# Patient Record
Sex: FEMALE | Race: WHITE | Employment: OTHER
[De-identification: names, ages, dates, MRNs, and addresses within clinical notes are randomized per-mention and may not be internally consistent; named-entity substitution may affect disease eponyms.]

---

## 2017-02-17 ENCOUNTER — TELEPHONE (OUTPATIENT)
Dept: INTERNAL MEDICINE | Facility: CLINIC | Age: 42
End: 2017-02-17

## 2017-02-17 ENCOUNTER — OFFICE VISIT (OUTPATIENT)
Dept: INTERNAL MEDICINE | Facility: CLINIC | Age: 42
End: 2017-02-17

## 2017-02-17 ENCOUNTER — HOSPITAL ENCOUNTER (OUTPATIENT)
Age: 42
Discharge: HOME | End: 2017-02-17
Payer: MEDICARE

## 2017-02-17 VITALS
DIASTOLIC BLOOD PRESSURE: 90 MMHG | HEIGHT: 63 IN | TEMPERATURE: 98.1 F | BODY MASS INDEX: 38.09 KG/M2 | WEIGHT: 215 LBS | SYSTOLIC BLOOD PRESSURE: 140 MMHG

## 2017-02-17 DIAGNOSIS — J06.9 UPPER RESPIRATORY TRACT INFECTION, UNSPECIFIED TYPE: Primary | ICD-10-CM

## 2017-02-17 PROCEDURE — G8428 CUR MEDS NOT DOCUMENT: HCPCS | Performed by: INTERNAL MEDICINE

## 2017-02-17 PROCEDURE — 99213 OFFICE O/P EST LOW 20 MIN: CPT | Performed by: INTERNAL MEDICINE

## 2017-02-17 PROCEDURE — 4004F PT TOBACCO SCREEN RCVD TLK: CPT | Performed by: INTERNAL MEDICINE

## 2017-02-17 PROCEDURE — G8417 CALC BMI ABV UP PARAM F/U: HCPCS | Performed by: INTERNAL MEDICINE

## 2017-02-17 PROCEDURE — G8484 FLU IMMUNIZE NO ADMIN: HCPCS | Performed by: INTERNAL MEDICINE

## 2017-02-17 RX ORDER — AZITHROMYCIN 250 MG/1
TABLET, FILM COATED ORAL
Qty: 6 TABLET | Refills: 0 | Status: SHIPPED | OUTPATIENT
Start: 2017-02-17 | End: 2017-03-14 | Stop reason: ALTCHOICE

## 2017-02-17 ASSESSMENT — PATIENT HEALTH QUESTIONNAIRE - PHQ9
2. FEELING DOWN, DEPRESSED OR HOPELESS: 3
9. THOUGHTS THAT YOU WOULD BE BETTER OFF DEAD, OR OF HURTING YOURSELF: 0
4. FEELING TIRED OR HAVING LITTLE ENERGY: 0
3. TROUBLE FALLING OR STAYING ASLEEP: 0
7. TROUBLE CONCENTRATING ON THINGS, SUCH AS READING THE NEWSPAPER OR WATCHING TELEVISION: 0
6. FEELING BAD ABOUT YOURSELF - OR THAT YOU ARE A FAILURE OR HAVE LET YOURSELF OR YOUR FAMILY DOWN: 0
10. IF YOU CHECKED OFF ANY PROBLEMS, HOW DIFFICULT HAVE THESE PROBLEMS MADE IT FOR YOU TO DO YOUR WORK, TAKE CARE OF THINGS AT HOME, OR GET ALONG WITH OTHER PEOPLE: 0
8. MOVING OR SPEAKING SO SLOWLY THAT OTHER PEOPLE COULD HAVE NOTICED. OR THE OPPOSITE, BEING SO FIGETY OR RESTLESS THAT YOU HAVE BEEN MOVING AROUND A LOT MORE THAN USUAL: 0
1. LITTLE INTEREST OR PLEASURE IN DOING THINGS: 0
SUM OF ALL RESPONSES TO PHQ QUESTIONS 1-9: 3
SUM OF ALL RESPONSES TO PHQ9 QUESTIONS 1 & 2: 3
5. POOR APPETITE OR OVEREATING: 0

## 2017-02-21 ENCOUNTER — HOSPITAL ENCOUNTER (EMERGENCY)
Age: 42
Discharge: HOME | End: 2017-02-21
Attending: EMERGENCY MEDICINE
Payer: MEDICARE

## 2017-02-21 ENCOUNTER — APPOINTMENT (OUTPATIENT)
Dept: GENERAL RADIOLOGY | Age: 42
End: 2017-02-21
Payer: MEDICARE

## 2017-02-21 VITALS
BODY MASS INDEX: 38.97 KG/M2 | WEIGHT: 220 LBS | TEMPERATURE: 98.8 F | DIASTOLIC BLOOD PRESSURE: 95 MMHG | HEART RATE: 94 BPM | SYSTOLIC BLOOD PRESSURE: 128 MMHG | RESPIRATION RATE: 18 BRPM

## 2017-02-21 DIAGNOSIS — B34.9 VIRAL ILLNESS: ICD-10-CM

## 2017-02-21 DIAGNOSIS — R52 BODY ACHES: Primary | ICD-10-CM

## 2017-02-21 LAB
-: ABNORMAL
ABSOLUTE EOS #: 0.1 K/UL (ref 0–0.4)
ABSOLUTE LYMPH #: 1.7 K/UL (ref 1–4.8)
ABSOLUTE MONO #: 0.4 K/UL (ref 0.1–1.2)
AMORPHOUS: ABNORMAL
BACTERIA: ABNORMAL
BASOPHILS # BLD: 1 % (ref 0–2)
BASOPHILS ABSOLUTE: 0 K/UL (ref 0–0.2)
BILIRUBIN URINE: NEGATIVE
CASTS UA: ABNORMAL /LPF (ref 0–8)
COLOR: YELLOW
COMMENT UA: ABNORMAL
CRYSTALS, UA: ABNORMAL /HPF
DIFFERENTIAL TYPE: NORMAL
EOSINOPHILS RELATIVE PERCENT: 1 % (ref 1–4)
EPITHELIAL CELLS UA: ABNORMAL /HPF (ref 0–5)
GLUCOSE URINE: NEGATIVE
HCG QUALITATIVE: NEGATIVE
HCT VFR BLD CALC: 39.4 % (ref 36–46)
HEMOGLOBIN: 13.2 G/DL (ref 12–16)
KETONES, URINE: NEGATIVE
LEUKOCYTE ESTERASE, URINE: NEGATIVE
LYMPHOCYTES # BLD: 30 % (ref 24–44)
MCH RBC QN AUTO: 27.2 PG (ref 26–34)
MCHC RBC AUTO-ENTMCNC: 33.4 G/DL (ref 31–37)
MCV RBC AUTO: 81.4 FL (ref 80–100)
MONOCYTES # BLD: 7 % (ref 2–11)
MUCUS: ABNORMAL
NITRITE, URINE: NEGATIVE
OTHER OBSERVATIONS UA: ABNORMAL
PDW BLD-RTO: 14.6 % (ref 12.5–15.4)
PH UA: 6 (ref 5–8)
PLATELET # BLD: 212 K/UL (ref 140–450)
PLATELET ESTIMATE: NORMAL
PMV BLD AUTO: 8.2 FL (ref 6–12)
PROTEIN UA: NEGATIVE
RBC # BLD: 4.84 M/UL (ref 4–5.2)
RBC # BLD: NORMAL 10*6/UL
RBC UA: ABNORMAL /HPF (ref 0–4)
RENAL EPITHELIAL, UA: ABNORMAL /HPF
SEG NEUTROPHILS: 61 % (ref 36–66)
SEGMENTED NEUTROPHILS ABSOLUTE COUNT: 3.4 K/UL (ref 1.8–7.7)
SPECIFIC GRAVITY UA: 1.02 (ref 1–1.03)
TRICHOMONAS: ABNORMAL
TURBIDITY: ABNORMAL
URINE HGB: NEGATIVE
UROBILINOGEN, URINE: NORMAL
WBC # BLD: 5.6 K/UL (ref 3.5–11)
WBC # BLD: NORMAL 10*3/UL
WBC UA: ABNORMAL /HPF (ref 0–5)
YEAST: ABNORMAL

## 2017-02-21 PROCEDURE — 87086 URINE CULTURE/COLONY COUNT: CPT

## 2017-02-21 PROCEDURE — 99285 EMERGENCY DEPT VISIT HI MDM: CPT

## 2017-02-21 PROCEDURE — S0028 INJECTION, FAMOTIDINE, 20 MG: HCPCS | Performed by: EMERGENCY MEDICINE

## 2017-02-21 PROCEDURE — 96375 TX/PRO/DX INJ NEW DRUG ADDON: CPT

## 2017-02-21 PROCEDURE — 96374 THER/PROPH/DIAG INJ IV PUSH: CPT

## 2017-02-21 PROCEDURE — 2500000003 HC RX 250 WO HCPCS: Performed by: EMERGENCY MEDICINE

## 2017-02-21 PROCEDURE — 93005 ELECTROCARDIOGRAM TRACING: CPT

## 2017-02-21 PROCEDURE — 6370000000 HC RX 637 (ALT 250 FOR IP): Performed by: EMERGENCY MEDICINE

## 2017-02-21 PROCEDURE — 85025 COMPLETE CBC W/AUTO DIFF WBC: CPT

## 2017-02-21 PROCEDURE — 84703 CHORIONIC GONADOTROPIN ASSAY: CPT

## 2017-02-21 PROCEDURE — 71020 XR CHEST STANDARD TWO VW: CPT

## 2017-02-21 PROCEDURE — 2580000003 HC RX 258: Performed by: EMERGENCY MEDICINE

## 2017-02-21 PROCEDURE — 6360000002 HC RX W HCPCS: Performed by: EMERGENCY MEDICINE

## 2017-02-21 PROCEDURE — 81001 URINALYSIS AUTO W/SCOPE: CPT

## 2017-02-21 PROCEDURE — 71020 XR CHEST STANDARD TWO VW: CPT | Performed by: RADIOLOGY

## 2017-02-21 RX ORDER — ONDANSETRON 2 MG/ML
4 INJECTION INTRAMUSCULAR; INTRAVENOUS ONCE
Status: COMPLETED | OUTPATIENT
Start: 2017-02-21 | End: 2017-02-21

## 2017-02-21 RX ORDER — MAGNESIUM HYDROXIDE/ALUMINUM HYDROXICE/SIMETHICONE 120; 1200; 1200 MG/30ML; MG/30ML; MG/30ML
30 SUSPENSION ORAL
Status: COMPLETED | OUTPATIENT
Start: 2017-02-21 | End: 2017-02-21

## 2017-02-21 RX ORDER — 0.9 % SODIUM CHLORIDE 0.9 %
1000 INTRAVENOUS SOLUTION INTRAVENOUS ONCE
Status: COMPLETED | OUTPATIENT
Start: 2017-02-21 | End: 2017-02-21

## 2017-02-21 RX ORDER — SULFAMETHOXAZOLE AND TRIMETHOPRIM 800; 160 MG/1; MG/1
1 TABLET ORAL 2 TIMES DAILY
Qty: 6 TABLET | Refills: 0 | Status: SHIPPED | OUTPATIENT
Start: 2017-02-21 | End: 2017-02-24

## 2017-02-21 RX ORDER — LIDOCAINE HYDROCHLORIDE 40 MG/ML
4 SOLUTION TOPICAL ONCE
Status: DISCONTINUED | OUTPATIENT
Start: 2017-02-21 | End: 2017-02-21

## 2017-02-21 RX ORDER — OXYCODONE HYDROCHLORIDE AND ACETAMINOPHEN 5; 325 MG/1; MG/1
1 TABLET ORAL ONCE
Status: COMPLETED | OUTPATIENT
Start: 2017-02-21 | End: 2017-02-21

## 2017-02-21 RX ADMIN — ALUMINUM HYDROXIDE, MAGNESIUM HYDROXIDE, AND SIMETHICONE 30 ML: 200; 200; 20 SUSPENSION ORAL at 15:50

## 2017-02-21 RX ADMIN — OXYCODONE HYDROCHLORIDE AND ACETAMINOPHEN 1 TABLET: 5; 325 TABLET ORAL at 17:04

## 2017-02-21 RX ADMIN — SODIUM CHLORIDE 1000 ML: 9 INJECTION, SOLUTION INTRAVENOUS at 15:50

## 2017-02-21 RX ADMIN — ONDANSETRON 4 MG: 2 INJECTION, SOLUTION INTRAMUSCULAR; INTRAVENOUS at 15:52

## 2017-02-21 RX ADMIN — FAMOTIDINE 20 MG: 10 INJECTION INTRAVENOUS at 15:52

## 2017-02-21 RX ADMIN — LIDOCAINE HYDROCHLORIDE 4 ML: 20 SOLUTION ORAL; TOPICAL at 15:50

## 2017-02-21 ASSESSMENT — PAIN DESCRIPTION - PROGRESSION: CLINICAL_PROGRESSION: GRADUALLY WORSENING

## 2017-02-21 ASSESSMENT — PAIN SCALES - GENERAL: PAINLEVEL_OUTOF10: 9

## 2017-02-21 ASSESSMENT — PAIN DESCRIPTION - ONSET: ONSET: ON-GOING

## 2017-02-21 ASSESSMENT — ENCOUNTER SYMPTOMS
SHORTNESS OF BREATH: 0
ABDOMINAL PAIN: 0
BLURRED VISION: 0

## 2017-02-21 ASSESSMENT — PAIN DESCRIPTION - PAIN TYPE: TYPE: ACUTE PAIN

## 2017-02-21 ASSESSMENT — PAIN DESCRIPTION - LOCATION: LOCATION: ABDOMEN;THROAT

## 2017-02-21 ASSESSMENT — PAIN DESCRIPTION - FREQUENCY: FREQUENCY: CONTINUOUS

## 2017-02-22 LAB
CULTURE: NORMAL
CULTURE: NORMAL
Lab: NORMAL
SPECIMEN DESCRIPTION: NORMAL
STATUS: NORMAL

## 2017-02-24 ENCOUNTER — HOSPITAL ENCOUNTER (EMERGENCY)
Age: 42
Discharge: HOME | End: 2017-02-24
Attending: EMERGENCY MEDICINE
Payer: MEDICARE

## 2017-02-24 ENCOUNTER — APPOINTMENT (OUTPATIENT)
Dept: GENERAL RADIOLOGY | Age: 42
End: 2017-02-24
Payer: MEDICARE

## 2017-02-24 VITALS
HEART RATE: 104 BPM | RESPIRATION RATE: 16 BRPM | BODY MASS INDEX: 38.09 KG/M2 | OXYGEN SATURATION: 98 % | HEIGHT: 63 IN | WEIGHT: 215 LBS | TEMPERATURE: 97.8 F | DIASTOLIC BLOOD PRESSURE: 97 MMHG | SYSTOLIC BLOOD PRESSURE: 133 MMHG

## 2017-02-24 DIAGNOSIS — M54.6 ACUTE LEFT-SIDED THORACIC BACK PAIN: Primary | ICD-10-CM

## 2017-02-24 LAB
EKG ATRIAL RATE: 85 BPM
EKG P AXIS: 34 DEGREES
EKG P-R INTERVAL: 136 MS
EKG Q-T INTERVAL: 368 MS
EKG QRS DURATION: 68 MS
EKG QTC CALCULATION (BAZETT): 437 MS
EKG R AXIS: 29 DEGREES
EKG T AXIS: 41 DEGREES
EKG VENTRICULAR RATE: 85 BPM

## 2017-02-24 PROCEDURE — 6360000002 HC RX W HCPCS: Performed by: EMERGENCY MEDICINE

## 2017-02-24 PROCEDURE — 96372 THER/PROPH/DIAG INJ SC/IM: CPT

## 2017-02-24 PROCEDURE — G0382 LEV 3 HOSP TYPE B ED VISIT: HCPCS

## 2017-02-24 PROCEDURE — 71020 XR CHEST STANDARD TWO VW: CPT

## 2017-02-24 RX ORDER — ORPHENADRINE CITRATE 30 MG/ML
60 INJECTION INTRAMUSCULAR; INTRAVENOUS ONCE
Status: COMPLETED | OUTPATIENT
Start: 2017-02-24 | End: 2017-02-24

## 2017-02-24 RX ORDER — CLONIDINE HYDROCHLORIDE 0.1 MG/1
0.1 TABLET ORAL 2 TIMES DAILY
Qty: 14 TABLET | Refills: 0 | Status: SHIPPED | OUTPATIENT
Start: 2017-02-24 | End: 2017-05-17

## 2017-02-24 RX ADMIN — ORPHENADRINE CITRATE 60 MG: 30 INJECTION INTRAMUSCULAR; INTRAVENOUS at 15:48

## 2017-02-24 ASSESSMENT — ENCOUNTER SYMPTOMS
SORE THROAT: 0
RHINORRHEA: 0
ABDOMINAL PAIN: 0
PHOTOPHOBIA: 0
COUGH: 0
BLOOD IN STOOL: 0
DIARRHEA: 0
SHORTNESS OF BREATH: 0
VOMITING: 0
SINUS PRESSURE: 0
CONSTIPATION: 0
TROUBLE SWALLOWING: 0
NAUSEA: 0
BACK PAIN: 1

## 2017-02-24 ASSESSMENT — PAIN SCALES - GENERAL: PAINLEVEL_OUTOF10: 10

## 2017-02-24 ASSESSMENT — PAIN DESCRIPTION - PAIN TYPE: TYPE: CHRONIC PAIN

## 2017-02-24 ASSESSMENT — PAIN DESCRIPTION - LOCATION: LOCATION: GENERALIZED

## 2017-03-09 ENCOUNTER — HOSPITAL ENCOUNTER (EMERGENCY)
Age: 42
Discharge: HOME | End: 2017-03-09
Attending: EMERGENCY MEDICINE
Payer: MEDICARE

## 2017-03-09 ENCOUNTER — APPOINTMENT (OUTPATIENT)
Dept: GENERAL RADIOLOGY | Age: 42
End: 2017-03-09
Payer: MEDICARE

## 2017-03-09 VITALS
SYSTOLIC BLOOD PRESSURE: 155 MMHG | DIASTOLIC BLOOD PRESSURE: 99 MMHG | OXYGEN SATURATION: 99 % | WEIGHT: 210 LBS | TEMPERATURE: 97.5 F | HEART RATE: 113 BPM | BODY MASS INDEX: 37.21 KG/M2 | RESPIRATION RATE: 19 BRPM | HEIGHT: 63 IN

## 2017-03-09 DIAGNOSIS — M79.671 RIGHT FOOT PAIN: Primary | ICD-10-CM

## 2017-03-09 PROCEDURE — 6370000000 HC RX 637 (ALT 250 FOR IP): Performed by: EMERGENCY MEDICINE

## 2017-03-09 PROCEDURE — 73630 X-RAY EXAM OF FOOT: CPT

## 2017-03-09 PROCEDURE — 99283 EMERGENCY DEPT VISIT LOW MDM: CPT

## 2017-03-09 RX ORDER — ACETAMINOPHEN 325 MG/1
325 TABLET ORAL EVERY 6 HOURS PRN
Qty: 120 TABLET | Refills: 3 | Status: SHIPPED | OUTPATIENT
Start: 2017-03-09 | End: 2017-05-13

## 2017-03-09 RX ORDER — ACETAMINOPHEN 500 MG
1000 TABLET ORAL ONCE
Status: COMPLETED | OUTPATIENT
Start: 2017-03-09 | End: 2017-03-09

## 2017-03-09 RX ADMIN — ACETAMINOPHEN 1000 MG: 500 TABLET ORAL at 08:25

## 2017-03-09 ASSESSMENT — ENCOUNTER SYMPTOMS
ABDOMINAL PAIN: 0
BACK PAIN: 0
SHORTNESS OF BREATH: 0
VOMITING: 0
NAUSEA: 0

## 2017-03-09 ASSESSMENT — PAIN SCALES - GENERAL
PAINLEVEL_OUTOF10: 7
PAINLEVEL_OUTOF10: 9

## 2017-03-14 ENCOUNTER — HOSPITAL ENCOUNTER (OUTPATIENT)
Dept: PAIN MANAGEMENT | Age: 42
Discharge: HOME | End: 2017-03-14
Payer: MEDICARE

## 2017-03-14 ENCOUNTER — HOSPITAL ENCOUNTER (EMERGENCY)
Age: 42
Discharge: HOME | End: 2017-03-14
Attending: EMERGENCY MEDICINE
Payer: MEDICARE

## 2017-03-14 VITALS
RESPIRATION RATE: 17 BRPM | HEART RATE: 76 BPM | TEMPERATURE: 97 F | DIASTOLIC BLOOD PRESSURE: 95 MMHG | SYSTOLIC BLOOD PRESSURE: 144 MMHG | OXYGEN SATURATION: 98 %

## 2017-03-14 VITALS
TEMPERATURE: 98.3 F | HEIGHT: 63 IN | HEART RATE: 81 BPM | WEIGHT: 210 LBS | DIASTOLIC BLOOD PRESSURE: 86 MMHG | RESPIRATION RATE: 18 BRPM | SYSTOLIC BLOOD PRESSURE: 127 MMHG | BODY MASS INDEX: 37.21 KG/M2

## 2017-03-14 DIAGNOSIS — E11.9 TYPE 2 DIABETES MELLITUS WITHOUT COMPLICATION, WITHOUT LONG-TERM CURRENT USE OF INSULIN (HCC): Primary | ICD-10-CM

## 2017-03-14 DIAGNOSIS — R21 RASH AND OTHER NONSPECIFIC SKIN ERUPTION: Primary | ICD-10-CM

## 2017-03-14 PROCEDURE — 99214 OFFICE O/P EST MOD 30 MIN: CPT

## 2017-03-14 PROCEDURE — 99282 EMERGENCY DEPT VISIT SF MDM: CPT

## 2017-03-14 PROCEDURE — 6370000000 HC RX 637 (ALT 250 FOR IP): Performed by: EMERGENCY MEDICINE

## 2017-03-14 RX ORDER — TIZANIDINE 4 MG/1
4 TABLET ORAL NIGHTLY
COMMUNITY
End: 2017-03-14 | Stop reason: SDUPTHER

## 2017-03-14 RX ORDER — MELOXICAM 7.5 MG/1
7.5 TABLET ORAL EVERY 12 HOURS
COMMUNITY
End: 2017-03-14 | Stop reason: SDUPTHER

## 2017-03-14 RX ORDER — CLONIDINE HYDROCHLORIDE 0.1 MG/1
0.1 TABLET ORAL ONCE
Status: COMPLETED | OUTPATIENT
Start: 2017-03-14 | End: 2017-03-14

## 2017-03-14 RX ORDER — DIPHENHYDRAMINE HCL 25 MG
25 CAPSULE ORAL EVERY 6 HOURS PRN
Qty: 12 CAPSULE | Refills: 0 | Status: SHIPPED | OUTPATIENT
Start: 2017-03-14 | End: 2017-03-17

## 2017-03-14 RX ORDER — DIPHENHYDRAMINE HCL 25 MG
50 TABLET ORAL ONCE
Status: COMPLETED | OUTPATIENT
Start: 2017-03-14 | End: 2017-03-14

## 2017-03-14 RX ORDER — MELOXICAM 7.5 MG/1
7.5 TABLET ORAL EVERY 12 HOURS
Qty: 28 TABLET | Refills: 0 | Status: SHIPPED | OUTPATIENT
Start: 2017-03-14 | End: 2017-04-05

## 2017-03-14 RX ORDER — PREDNISONE 10 MG/1
TABLET ORAL
Qty: 20 TABLET | Refills: 0 | Status: SHIPPED | OUTPATIENT
Start: 2017-03-14 | End: 2017-03-24

## 2017-03-14 RX ORDER — TIZANIDINE 4 MG/1
4 TABLET ORAL NIGHTLY
Qty: 30 TABLET | Refills: 0 | Status: SHIPPED | OUTPATIENT
Start: 2017-03-14 | End: 2017-04-05 | Stop reason: SDUPTHER

## 2017-03-14 RX ADMIN — DIPHENHYDRAMINE HCL 50 MG: 25 TABLET ORAL at 06:23

## 2017-03-14 RX ADMIN — CLONIDINE HYDROCHLORIDE 0.1 MG: 0.1 TABLET ORAL at 06:23

## 2017-03-14 ASSESSMENT — PAIN DESCRIPTION - LOCATION: LOCATION: BACK;BUTTOCKS;LEG

## 2017-03-14 ASSESSMENT — PAIN DESCRIPTION - FREQUENCY: FREQUENCY: CONTINUOUS

## 2017-03-14 ASSESSMENT — PAIN DESCRIPTION - PROGRESSION: CLINICAL_PROGRESSION: GRADUALLY WORSENING

## 2017-03-14 ASSESSMENT — PAIN DESCRIPTION - PAIN TYPE: TYPE: CHRONIC PAIN

## 2017-03-14 ASSESSMENT — PAIN DESCRIPTION - ORIENTATION: ORIENTATION: RIGHT;LOWER

## 2017-03-14 ASSESSMENT — PAIN DESCRIPTION - ONSET: ONSET: ON-GOING

## 2017-03-14 ASSESSMENT — PAIN SCALES - GENERAL: PAINLEVEL_OUTOF10: 8

## 2017-04-05 ENCOUNTER — HOSPITAL ENCOUNTER (OUTPATIENT)
Dept: PAIN MANAGEMENT | Age: 42
Discharge: HOME | End: 2017-04-05
Payer: MEDICARE

## 2017-04-05 VITALS
TEMPERATURE: 97.9 F | RESPIRATION RATE: 18 BRPM | SYSTOLIC BLOOD PRESSURE: 128 MMHG | HEART RATE: 90 BPM | OXYGEN SATURATION: 98 % | DIASTOLIC BLOOD PRESSURE: 94 MMHG | WEIGHT: 210 LBS | HEIGHT: 63 IN | BODY MASS INDEX: 37.21 KG/M2

## 2017-04-05 DIAGNOSIS — M54.9 BACKACHE, UNSPECIFIED: ICD-10-CM

## 2017-04-05 LAB — GLUCOSE BLD-MCNC: 127 MG/DL (ref 65–105)

## 2017-04-05 PROCEDURE — 2580000003 HC RX 258: Performed by: ANESTHESIOLOGY

## 2017-04-05 PROCEDURE — 64636 DESTROY L/S FACET JNT ADDL: CPT

## 2017-04-05 PROCEDURE — 82947 ASSAY GLUCOSE BLOOD QUANT: CPT

## 2017-04-05 PROCEDURE — 6360000002 HC RX W HCPCS

## 2017-04-05 PROCEDURE — 6360000002 HC RX W HCPCS: Performed by: ANESTHESIOLOGY

## 2017-04-05 PROCEDURE — 2500000003 HC RX 250 WO HCPCS

## 2017-04-05 PROCEDURE — 64635 DESTROY LUMB/SAC FACET JNT: CPT

## 2017-04-05 RX ORDER — SODIUM CHLORIDE, SODIUM LACTATE, POTASSIUM CHLORIDE, CALCIUM CHLORIDE 600; 310; 30; 20 MG/100ML; MG/100ML; MG/100ML; MG/100ML
500 INJECTION, SOLUTION INTRAVENOUS CONTINUOUS
Status: DISCONTINUED | OUTPATIENT
Start: 2017-04-05 | End: 2017-04-06 | Stop reason: HOSPADM

## 2017-04-05 RX ORDER — LIDOCAINE HYDROCHLORIDE 10 MG/ML
0.5 INJECTION, SOLUTION EPIDURAL; INFILTRATION; INTRACAUDAL; PERINEURAL ONCE
Status: DISCONTINUED | OUTPATIENT
Start: 2017-04-05 | End: 2017-04-06 | Stop reason: HOSPADM

## 2017-04-05 RX ORDER — FENTANYL CITRATE 50 UG/ML
100 INJECTION, SOLUTION INTRAMUSCULAR; INTRAVENOUS
Status: DISCONTINUED | OUTPATIENT
Start: 2017-04-05 | End: 2017-04-06 | Stop reason: HOSPADM

## 2017-04-05 RX ORDER — TIZANIDINE 4 MG/1
4 TABLET ORAL NIGHTLY
Qty: 30 TABLET | Refills: 0 | Status: SHIPPED | OUTPATIENT
Start: 2017-04-13 | End: 2017-05-13

## 2017-04-05 RX ORDER — MIDAZOLAM HYDROCHLORIDE 1 MG/ML
0.5 INJECTION INTRAMUSCULAR; INTRAVENOUS 3 TIMES DAILY PRN
Status: DISCONTINUED | OUTPATIENT
Start: 2017-04-05 | End: 2017-04-06 | Stop reason: HOSPADM

## 2017-04-05 RX ADMIN — FENTANYL CITRATE 25 MCG: 50 INJECTION, SOLUTION INTRAMUSCULAR; INTRAVENOUS at 07:32

## 2017-04-05 RX ADMIN — FENTANYL CITRATE 50 MCG: 50 INJECTION, SOLUTION INTRAMUSCULAR; INTRAVENOUS at 07:30

## 2017-04-05 RX ADMIN — SODIUM CHLORIDE, POTASSIUM CHLORIDE, SODIUM LACTATE AND CALCIUM CHLORIDE 500 ML: 600; 310; 30; 20 INJECTION, SOLUTION INTRAVENOUS at 07:16

## 2017-04-05 RX ADMIN — MIDAZOLAM HYDROCHLORIDE 1 MG: 1 INJECTION, SOLUTION INTRAMUSCULAR; INTRAVENOUS at 07:30

## 2017-04-05 RX ADMIN — FENTANYL CITRATE 25 MCG: 50 INJECTION, SOLUTION INTRAMUSCULAR; INTRAVENOUS at 07:42

## 2017-04-05 RX ADMIN — MIDAZOLAM HYDROCHLORIDE 0.5 MG: 1 INJECTION, SOLUTION INTRAMUSCULAR; INTRAVENOUS at 07:32

## 2017-04-05 RX ADMIN — MIDAZOLAM HYDROCHLORIDE 0.5 MG: 1 INJECTION, SOLUTION INTRAMUSCULAR; INTRAVENOUS at 07:42

## 2017-04-05 ASSESSMENT — PAIN DESCRIPTION - LOCATION: LOCATION: BACK;BUTTOCKS;LEG

## 2017-04-05 ASSESSMENT — PAIN DESCRIPTION - ORIENTATION: ORIENTATION: RIGHT;LOWER

## 2017-04-05 ASSESSMENT — PAIN - FUNCTIONAL ASSESSMENT: PAIN_FUNCTIONAL_ASSESSMENT: 0-10

## 2017-04-05 ASSESSMENT — PAIN SCALES - GENERAL
PAINLEVEL_OUTOF10: 7
PAINLEVEL_OUTOF10: 9

## 2017-04-05 ASSESSMENT — PAIN DESCRIPTION - PAIN TYPE: TYPE: CHRONIC PAIN

## 2017-04-05 ASSESSMENT — PAIN DESCRIPTION - FREQUENCY: FREQUENCY: CONTINUOUS

## 2017-04-05 ASSESSMENT — PAIN DESCRIPTION - ONSET: ONSET: ON-GOING

## 2017-04-05 ASSESSMENT — PAIN DESCRIPTION - PROGRESSION: CLINICAL_PROGRESSION: GRADUALLY WORSENING

## 2017-04-12 ENCOUNTER — HOSPITAL ENCOUNTER (OUTPATIENT)
Age: 42
Setting detail: OBSERVATION
Discharge: HOME | End: 2017-04-13
Attending: EMERGENCY MEDICINE | Admitting: EMERGENCY MEDICINE
Payer: MEDICARE

## 2017-04-12 ENCOUNTER — APPOINTMENT (OUTPATIENT)
Dept: ULTRASOUND IMAGING | Age: 42
End: 2017-04-12
Payer: MEDICARE

## 2017-04-12 DIAGNOSIS — R10.11 ABDOMINAL PAIN, RIGHT UPPER QUADRANT: Primary | ICD-10-CM

## 2017-04-12 LAB
ABSOLUTE EOS #: 0 K/UL (ref 0–0.4)
ABSOLUTE LYMPH #: 2.4 K/UL (ref 1–4.8)
ABSOLUTE MONO #: 0.6 K/UL (ref 0.1–1.2)
ALBUMIN SERPL-MCNC: 4.1 G/DL (ref 3.5–5.2)
ALBUMIN/GLOBULIN RATIO: 1.3 (ref 1–2.5)
ALP BLD-CCNC: 92 U/L (ref 35–104)
ALT SERPL-CCNC: 13 U/L (ref 5–33)
ANION GAP SERPL CALCULATED.3IONS-SCNC: 17 MMOL/L (ref 9–17)
AST SERPL-CCNC: 16 U/L
BASOPHILS # BLD: 0 % (ref 0–2)
BASOPHILS ABSOLUTE: 0 K/UL (ref 0–0.2)
BILIRUB SERPL-MCNC: 0.25 MG/DL (ref 0.3–1.2)
BILIRUBIN URINE: NEGATIVE
BUN BLDV-MCNC: 9 MG/DL (ref 6–20)
BUN/CREAT BLD: ABNORMAL (ref 9–20)
CALCIUM SERPL-MCNC: 9.1 MG/DL (ref 8.6–10.4)
CHLORIDE BLD-SCNC: 101 MMOL/L (ref 98–107)
CO2: 22 MMOL/L (ref 20–31)
COLOR: YELLOW
COMMENT UA: NORMAL
CREAT SERPL-MCNC: 0.85 MG/DL (ref 0.5–0.9)
DIFFERENTIAL TYPE: ABNORMAL
EOSINOPHILS RELATIVE PERCENT: 0 % (ref 1–4)
FIO2: ABNORMAL
GFR AFRICAN AMERICAN: >60 ML/MIN
GFR NON-AFRICAN AMERICAN: >60 ML/MIN
GFR SERPL CREATININE-BSD FRML MDRD: ABNORMAL ML/MIN/{1.73_M2}
GFR SERPL CREATININE-BSD FRML MDRD: ABNORMAL ML/MIN/{1.73_M2}
GLUCOSE BLD-MCNC: 156 MG/DL (ref 70–99)
GLUCOSE URINE: NEGATIVE
HCG QUANTITATIVE: <1 IU/L
HCO3 VENOUS: 26.8 MMOL/L (ref 24–30)
HCT VFR BLD CALC: 37.6 % (ref 36–46)
HEMOGLOBIN: 12.7 G/DL (ref 12–16)
KETONES, URINE: NEGATIVE
LEUKOCYTE ESTERASE, URINE: NEGATIVE
LIPASE: 40 U/L (ref 13–60)
LYMPHOCYTES # BLD: 20 % (ref 24–44)
MCH RBC QN AUTO: 25.7 PG (ref 26–34)
MCHC RBC AUTO-ENTMCNC: 33.7 G/DL (ref 31–37)
MCV RBC AUTO: 76.3 FL (ref 80–100)
MONOCYTES # BLD: 5 % (ref 2–11)
NEGATIVE BASE EXCESS, VEN: ABNORMAL (ref 0–2)
NITRITE, URINE: NEGATIVE
O2 DEVICE/FLOW/%: ABNORMAL
O2 SAT, VEN: 90 %
PATIENT TEMP: ABNORMAL
PCO2, VEN: 33 MM HG (ref 39–55)
PDW BLD-RTO: 15.9 % (ref 12.5–15.4)
PH UA: 7 (ref 5–8)
PH VENOUS: 7.52 (ref 7.32–7.42)
PLATELET # BLD: 305 K/UL (ref 140–450)
PLATELET ESTIMATE: ABNORMAL
PMV BLD AUTO: 8.6 FL (ref 6–12)
PO2, VEN: 53 MM HG (ref 30–50)
POC LACTIC ACID, VEN: 2.2 MMOL/L (ref 0.9–1.7)
POC PCO2 TEMP: ABNORMAL MM HG
POC PH TEMP: ABNORMAL
POC PO2 TEMP: ABNORMAL MM HG
POSITIVE BASE EXCESS, VEN: 4 (ref 0–2)
POTASSIUM SERPL-SCNC: 4.3 MMOL/L (ref 3.7–5.3)
PROTEIN UA: NEGATIVE
RBC # BLD: 4.93 M/UL (ref 4–5.2)
RBC # BLD: ABNORMAL 10*6/UL
SEG NEUTROPHILS: 75 % (ref 36–66)
SEGMENTED NEUTROPHILS ABSOLUTE COUNT: 8.5 K/UL (ref 1.8–7.7)
SODIUM BLD-SCNC: 140 MMOL/L (ref 135–144)
SPECIFIC GRAVITY UA: 1.02 (ref 1–1.03)
TOTAL CO2, VENOUS: 28 MM HG (ref 25–31)
TOTAL PROTEIN: 7.2 G/DL (ref 6.4–8.3)
TURBIDITY: CLEAR
URINE HGB: NEGATIVE
UROBILINOGEN, URINE: NORMAL
WBC # BLD: 11.6 K/UL (ref 3.5–11)
WBC # BLD: ABNORMAL 10*3/UL

## 2017-04-12 PROCEDURE — 82803 BLOOD GASES ANY COMBINATION: CPT

## 2017-04-12 PROCEDURE — 80053 COMPREHEN METABOLIC PANEL: CPT

## 2017-04-12 PROCEDURE — 99285 EMERGENCY DEPT VISIT HI MDM: CPT

## 2017-04-12 PROCEDURE — 83605 ASSAY OF LACTIC ACID: CPT

## 2017-04-12 PROCEDURE — G0378 HOSPITAL OBSERVATION PER HR: HCPCS

## 2017-04-12 PROCEDURE — A4216 STERILE WATER/SALINE, 10 ML: HCPCS

## 2017-04-12 PROCEDURE — 2580000003 HC RX 258: Performed by: EMERGENCY MEDICINE

## 2017-04-12 PROCEDURE — 85025 COMPLETE CBC W/AUTO DIFF WBC: CPT

## 2017-04-12 PROCEDURE — 6370000000 HC RX 637 (ALT 250 FOR IP): Performed by: EMERGENCY MEDICINE

## 2017-04-12 PROCEDURE — 96374 THER/PROPH/DIAG INJ IV PUSH: CPT

## 2017-04-12 PROCEDURE — 81003 URINALYSIS AUTO W/O SCOPE: CPT

## 2017-04-12 PROCEDURE — 84702 CHORIONIC GONADOTROPIN TEST: CPT

## 2017-04-12 PROCEDURE — 76705 ECHO EXAM OF ABDOMEN: CPT

## 2017-04-12 PROCEDURE — 6360000002 HC RX W HCPCS: Performed by: EMERGENCY MEDICINE

## 2017-04-12 PROCEDURE — 96376 TX/PRO/DX INJ SAME DRUG ADON: CPT

## 2017-04-12 PROCEDURE — 2580000003 HC RX 258

## 2017-04-12 PROCEDURE — 96375 TX/PRO/DX INJ NEW DRUG ADDON: CPT

## 2017-04-12 PROCEDURE — 6360000004 HC RX CONTRAST MEDICATION

## 2017-04-12 PROCEDURE — 83690 ASSAY OF LIPASE: CPT

## 2017-04-12 RX ORDER — ONDANSETRON 2 MG/ML
4 INJECTION INTRAMUSCULAR; INTRAVENOUS ONCE
Status: COMPLETED | OUTPATIENT
Start: 2017-04-12 | End: 2017-04-12

## 2017-04-12 RX ORDER — SODIUM CHLORIDE 9 MG/ML
INJECTION, SOLUTION INTRAVENOUS CONTINUOUS
Status: DISCONTINUED | OUTPATIENT
Start: 2017-04-12 | End: 2017-04-13 | Stop reason: HOSPADM

## 2017-04-12 RX ORDER — SODIUM CHLORIDE 0.9 % (FLUSH) 0.9 %
10 SYRINGE (ML) INJECTION PRN
Status: DISCONTINUED | OUTPATIENT
Start: 2017-04-12 | End: 2017-04-13 | Stop reason: HOSPADM

## 2017-04-12 RX ORDER — QUETIAPINE 300 MG/1
300 TABLET, FILM COATED, EXTENDED RELEASE ORAL NIGHTLY
Status: DISCONTINUED | OUTPATIENT
Start: 2017-04-12 | End: 2017-04-13 | Stop reason: HOSPADM

## 2017-04-12 RX ORDER — FENTANYL CITRATE 50 UG/ML
25 INJECTION, SOLUTION INTRAMUSCULAR; INTRAVENOUS
Status: DISCONTINUED | OUTPATIENT
Start: 2017-04-12 | End: 2017-04-13

## 2017-04-12 RX ORDER — CLONIDINE HYDROCHLORIDE 0.1 MG/1
0.1 TABLET ORAL 2 TIMES DAILY
Status: DISCONTINUED | OUTPATIENT
Start: 2017-04-12 | End: 2017-04-13 | Stop reason: HOSPADM

## 2017-04-12 RX ORDER — FENTANYL CITRATE 50 UG/ML
50 INJECTION, SOLUTION INTRAMUSCULAR; INTRAVENOUS
Status: DISCONTINUED | OUTPATIENT
Start: 2017-04-12 | End: 2017-04-13

## 2017-04-12 RX ORDER — FENTANYL CITRATE 50 UG/ML
25 INJECTION, SOLUTION INTRAMUSCULAR; INTRAVENOUS EVERY 4 HOURS PRN
Status: DISCONTINUED | OUTPATIENT
Start: 2017-04-12 | End: 2017-04-12

## 2017-04-12 RX ORDER — SODIUM CHLORIDE 0.9 % (FLUSH) 0.9 %
10 SYRINGE (ML) INJECTION EVERY 12 HOURS SCHEDULED
Status: DISCONTINUED | OUTPATIENT
Start: 2017-04-12 | End: 2017-04-13 | Stop reason: HOSPADM

## 2017-04-12 RX ORDER — FENTANYL CITRATE 50 UG/ML
50 INJECTION, SOLUTION INTRAMUSCULAR; INTRAVENOUS ONCE
Status: COMPLETED | OUTPATIENT
Start: 2017-04-12 | End: 2017-04-12

## 2017-04-12 RX ORDER — BUSPIRONE HYDROCHLORIDE 15 MG/1
30 TABLET ORAL 2 TIMES DAILY
Status: DISCONTINUED | OUTPATIENT
Start: 2017-04-12 | End: 2017-04-13 | Stop reason: HOSPADM

## 2017-04-12 RX ORDER — FENTANYL CITRATE 50 UG/ML
50 INJECTION, SOLUTION INTRAMUSCULAR; INTRAVENOUS EVERY 4 HOURS PRN
Status: DISCONTINUED | OUTPATIENT
Start: 2017-04-12 | End: 2017-04-12

## 2017-04-12 RX ORDER — 0.9 % SODIUM CHLORIDE 0.9 %
1000 INTRAVENOUS SOLUTION INTRAVENOUS ONCE
Status: COMPLETED | OUTPATIENT
Start: 2017-04-12 | End: 2017-04-12

## 2017-04-12 RX ORDER — GABAPENTIN 400 MG/1
400 CAPSULE ORAL 3 TIMES DAILY
Status: DISCONTINUED | OUTPATIENT
Start: 2017-04-12 | End: 2017-04-13 | Stop reason: HOSPADM

## 2017-04-12 RX ADMIN — FENTANYL CITRATE 50 MCG: 50 INJECTION, SOLUTION INTRAMUSCULAR; INTRAVENOUS at 18:50

## 2017-04-12 RX ADMIN — GABAPENTIN 400 MG: 400 CAPSULE ORAL at 22:10

## 2017-04-12 RX ADMIN — FENTANYL CITRATE 50 MCG: 50 INJECTION, SOLUTION INTRAMUSCULAR; INTRAVENOUS at 23:06

## 2017-04-12 RX ADMIN — SODIUM CHLORIDE: 9 INJECTION, SOLUTION INTRAVENOUS at 22:12

## 2017-04-12 RX ADMIN — QUETIAPINE FUMARATE 300 MG: 300 TABLET, EXTENDED RELEASE ORAL at 22:10

## 2017-04-12 RX ADMIN — ONDANSETRON 4 MG: 2 INJECTION, SOLUTION INTRAMUSCULAR; INTRAVENOUS at 18:50

## 2017-04-12 RX ADMIN — BUSPIRONE HYDROCHLORIDE 30 MG: 15 TABLET ORAL at 22:10

## 2017-04-12 RX ADMIN — SODIUM CHLORIDE 1000 ML: 9 INJECTION, SOLUTION INTRAVENOUS at 18:50

## 2017-04-12 RX ADMIN — SODIUM CHLORIDE, PRESERVATIVE FREE 10 ML: 5 INJECTION INTRAVENOUS at 22:15

## 2017-04-12 RX ADMIN — FENTANYL CITRATE 50 MCG: 50 INJECTION, SOLUTION INTRAMUSCULAR; INTRAVENOUS at 20:58

## 2017-04-12 RX ADMIN — CLONIDINE HYDROCHLORIDE 0.1 MG: 0.1 TABLET ORAL at 22:10

## 2017-04-12 ASSESSMENT — PAIN DESCRIPTION - ORIENTATION
ORIENTATION: RIGHT
ORIENTATION: RIGHT

## 2017-04-12 ASSESSMENT — PAIN DESCRIPTION - FREQUENCY
FREQUENCY: CONTINUOUS
FREQUENCY: CONTINUOUS

## 2017-04-12 ASSESSMENT — ENCOUNTER SYMPTOMS
NAUSEA: 1
VOMITING: 0
ABDOMINAL PAIN: 1
BACK PAIN: 0
COLOR CHANGE: 0

## 2017-04-12 ASSESSMENT — PAIN DESCRIPTION - DESCRIPTORS
DESCRIPTORS: SHARP;NAGGING
DESCRIPTORS: NAGGING;SHARP

## 2017-04-12 ASSESSMENT — PAIN DESCRIPTION - PAIN TYPE
TYPE: ACUTE PAIN
TYPE: ACUTE PAIN

## 2017-04-12 ASSESSMENT — PAIN DESCRIPTION - ONSET
ONSET: ON-GOING
ONSET: ON-GOING

## 2017-04-12 ASSESSMENT — PAIN SCALES - GENERAL
PAINLEVEL_OUTOF10: 10
PAINLEVEL_OUTOF10: 10
PAINLEVEL_OUTOF10: 6
PAINLEVEL_OUTOF10: 8

## 2017-04-12 ASSESSMENT — PAIN DESCRIPTION - LOCATION
LOCATION: ABDOMEN
LOCATION: ABDOMEN

## 2017-04-13 ENCOUNTER — APPOINTMENT (OUTPATIENT)
Dept: NUCLEAR MEDICINE | Age: 42
End: 2017-04-13
Payer: MEDICARE

## 2017-04-13 VITALS
HEART RATE: 57 BPM | BODY MASS INDEX: 37.92 KG/M2 | SYSTOLIC BLOOD PRESSURE: 92 MMHG | OXYGEN SATURATION: 97 % | WEIGHT: 214 LBS | TEMPERATURE: 97.7 F | DIASTOLIC BLOOD PRESSURE: 52 MMHG | HEIGHT: 63 IN | RESPIRATION RATE: 17 BRPM

## 2017-04-13 LAB — GLUCOSE BLD-MCNC: 112 MG/DL (ref 65–105)

## 2017-04-13 PROCEDURE — 2580000003 HC RX 258: Performed by: EMERGENCY MEDICINE

## 2017-04-13 PROCEDURE — 6360000004 HC RX CONTRAST MEDICATION: Performed by: EMERGENCY MEDICINE

## 2017-04-13 PROCEDURE — 94640 AIRWAY INHALATION TREATMENT: CPT

## 2017-04-13 PROCEDURE — 82947 ASSAY GLUCOSE BLOOD QUANT: CPT

## 2017-04-13 PROCEDURE — 3430000000 HC RX DIAGNOSTIC RADIOPHARMACEUTICAL: Performed by: EMERGENCY MEDICINE

## 2017-04-13 PROCEDURE — 6360000002 HC RX W HCPCS: Performed by: EMERGENCY MEDICINE

## 2017-04-13 PROCEDURE — A9537 TC99M MEBROFENIN: HCPCS | Performed by: EMERGENCY MEDICINE

## 2017-04-13 PROCEDURE — 96376 TX/PRO/DX INJ SAME DRUG ADON: CPT

## 2017-04-13 PROCEDURE — 6370000000 HC RX 637 (ALT 250 FOR IP): Performed by: EMERGENCY MEDICINE

## 2017-04-13 PROCEDURE — 78226 HEPATOBILIARY SYSTEM IMAGING: CPT

## 2017-04-13 PROCEDURE — G0378 HOSPITAL OBSERVATION PER HR: HCPCS

## 2017-04-13 RX ORDER — DICYCLOMINE HYDROCHLORIDE 10 MG/ML
10 INJECTION INTRAMUSCULAR EVERY 6 HOURS PRN
Status: DISCONTINUED | OUTPATIENT
Start: 2017-04-13 | End: 2017-04-13 | Stop reason: HOSPADM

## 2017-04-13 RX ORDER — DICYCLOMINE HCL 20 MG
20 TABLET ORAL 3 TIMES DAILY PRN
Qty: 120 TABLET | Refills: 3 | Status: SHIPPED | OUTPATIENT
Start: 2017-04-13 | End: 2018-06-04 | Stop reason: SDUPTHER

## 2017-04-13 RX ORDER — FENTANYL CITRATE 50 UG/ML
25 INJECTION, SOLUTION INTRAMUSCULAR; INTRAVENOUS EVERY 4 HOURS PRN
Status: DISCONTINUED | OUTPATIENT
Start: 2017-04-13 | End: 2017-04-13 | Stop reason: HOSPADM

## 2017-04-13 RX ORDER — HYDROCODONE BITARTRATE AND ACETAMINOPHEN 5; 325 MG/1; MG/1
1 TABLET ORAL EVERY 6 HOURS PRN
Qty: 10 TABLET | Refills: 0 | Status: SHIPPED | OUTPATIENT
Start: 2017-04-13 | End: 2017-04-20

## 2017-04-13 RX ADMIN — VORTIOXETINE 5 MG: 5 TABLET, FILM COATED ORAL at 10:57

## 2017-04-13 RX ADMIN — FENTANYL CITRATE 50 MCG: 50 INJECTION, SOLUTION INTRAMUSCULAR; INTRAVENOUS at 00:59

## 2017-04-13 RX ADMIN — SODIUM CHLORIDE: 9 INJECTION, SOLUTION INTRAVENOUS at 13:01

## 2017-04-13 RX ADMIN — FENTANYL CITRATE 50 MCG: 50 INJECTION, SOLUTION INTRAMUSCULAR; INTRAVENOUS at 03:21

## 2017-04-13 RX ADMIN — TIOTROPIUM BROMIDE 18 MCG: 18 CAPSULE ORAL; RESPIRATORY (INHALATION) at 08:53

## 2017-04-13 RX ADMIN — FENTANYL CITRATE 50 MCG: 50 INJECTION, SOLUTION INTRAMUSCULAR; INTRAVENOUS at 06:51

## 2017-04-13 RX ADMIN — FENTANYL CITRATE 25 MCG: 50 INJECTION, SOLUTION INTRAMUSCULAR; INTRAVENOUS at 16:06

## 2017-04-13 RX ADMIN — SINCALIDE 1.94 MCG: 5 INJECTION, POWDER, LYOPHILIZED, FOR SOLUTION INTRAVENOUS at 14:45

## 2017-04-13 RX ADMIN — BUSPIRONE HYDROCHLORIDE 30 MG: 15 TABLET ORAL at 10:56

## 2017-04-13 RX ADMIN — Medication 3 MILLICURIE: at 13:40

## 2017-04-13 RX ADMIN — GABAPENTIN 400 MG: 400 CAPSULE ORAL at 10:57

## 2017-04-13 ASSESSMENT — PAIN DESCRIPTION - ONSET
ONSET: ON-GOING

## 2017-04-13 ASSESSMENT — PAIN SCALES - GENERAL
PAINLEVEL_OUTOF10: 8
PAINLEVEL_OUTOF10: 6
PAINLEVEL_OUTOF10: 8
PAINLEVEL_OUTOF10: 8
PAINLEVEL_OUTOF10: 7
PAINLEVEL_OUTOF10: 7
PAINLEVEL_OUTOF10: 5
PAINLEVEL_OUTOF10: 4
PAINLEVEL_OUTOF10: 4

## 2017-04-13 ASSESSMENT — PAIN DESCRIPTION - LOCATION
LOCATION: ABDOMEN

## 2017-04-13 ASSESSMENT — PAIN DESCRIPTION - FREQUENCY
FREQUENCY: CONTINUOUS
FREQUENCY: INTERMITTENT
FREQUENCY: CONTINUOUS
FREQUENCY: INTERMITTENT

## 2017-04-13 ASSESSMENT — PAIN DESCRIPTION - ORIENTATION
ORIENTATION: RIGHT

## 2017-04-13 ASSESSMENT — PAIN DESCRIPTION - DESCRIPTORS
DESCRIPTORS: NAGGING;SHARP
DESCRIPTORS: NAGGING;SHARP
DESCRIPTORS: ACHING;SORE
DESCRIPTORS: NAGGING;SHARP
DESCRIPTORS: SORE;DISCOMFORT
DESCRIPTORS: NAGGING;SHARP

## 2017-04-13 ASSESSMENT — PAIN DESCRIPTION - PAIN TYPE
TYPE: ACUTE PAIN

## 2017-04-13 ASSESSMENT — PAIN DESCRIPTION - PROGRESSION
CLINICAL_PROGRESSION: NOT CHANGED
CLINICAL_PROGRESSION: NOT CHANGED

## 2017-04-14 ENCOUNTER — CARE COORDINATION (OUTPATIENT)
Dept: CARE COORDINATION | Age: 42
End: 2017-04-14

## 2017-04-17 ENCOUNTER — TELEPHONE (OUTPATIENT)
Dept: INTERNAL MEDICINE | Age: 42
End: 2017-04-17

## 2017-05-02 ENCOUNTER — HOSPITAL ENCOUNTER (EMERGENCY)
Age: 42
Discharge: HOME | End: 2017-05-03
Attending: EMERGENCY MEDICINE
Payer: MEDICARE

## 2017-05-02 ENCOUNTER — APPOINTMENT (OUTPATIENT)
Dept: GENERAL RADIOLOGY | Age: 42
End: 2017-05-02
Payer: MEDICARE

## 2017-05-02 DIAGNOSIS — R10.11 RUQ PAIN: ICD-10-CM

## 2017-05-02 DIAGNOSIS — R07.9 CHEST PAIN, UNSPECIFIED TYPE: Primary | ICD-10-CM

## 2017-05-02 PROCEDURE — 93005 ELECTROCARDIOGRAM TRACING: CPT

## 2017-05-02 PROCEDURE — 85379 FIBRIN DEGRADATION QUANT: CPT

## 2017-05-02 PROCEDURE — 6370000000 HC RX 637 (ALT 250 FOR IP): Performed by: EMERGENCY MEDICINE

## 2017-05-02 PROCEDURE — 83690 ASSAY OF LIPASE: CPT

## 2017-05-02 PROCEDURE — 99285 EMERGENCY DEPT VISIT HI MDM: CPT

## 2017-05-02 PROCEDURE — 85027 COMPLETE CBC AUTOMATED: CPT

## 2017-05-02 PROCEDURE — 80076 HEPATIC FUNCTION PANEL: CPT

## 2017-05-02 PROCEDURE — 80048 BASIC METABOLIC PNL TOTAL CA: CPT

## 2017-05-02 RX ORDER — NITROGLYCERIN 0.4 MG/1
0.4 TABLET SUBLINGUAL EVERY 5 MIN PRN
Status: DISCONTINUED | OUTPATIENT
Start: 2017-05-02 | End: 2017-05-03 | Stop reason: HOSPADM

## 2017-05-02 RX ADMIN — NITROGLYCERIN 0.4 MG: 0.4 TABLET SUBLINGUAL at 23:49

## 2017-05-02 ASSESSMENT — PAIN SCALES - GENERAL: PAINLEVEL_OUTOF10: 8

## 2017-05-02 ASSESSMENT — PAIN DESCRIPTION - PAIN TYPE: TYPE: ACUTE PAIN

## 2017-05-02 ASSESSMENT — PAIN DESCRIPTION - LOCATION: LOCATION: CHEST

## 2017-05-02 ASSESSMENT — PAIN DESCRIPTION - ORIENTATION: ORIENTATION: LEFT

## 2017-05-03 ENCOUNTER — APPOINTMENT (OUTPATIENT)
Dept: GENERAL RADIOLOGY | Age: 42
End: 2017-05-03
Payer: MEDICARE

## 2017-05-03 ENCOUNTER — TELEPHONE (OUTPATIENT)
Dept: INTERNAL MEDICINE | Age: 42
End: 2017-05-03

## 2017-05-03 VITALS
TEMPERATURE: 97 F | SYSTOLIC BLOOD PRESSURE: 123 MMHG | BODY MASS INDEX: 37.21 KG/M2 | RESPIRATION RATE: 18 BRPM | DIASTOLIC BLOOD PRESSURE: 86 MMHG | HEIGHT: 63 IN | HEART RATE: 86 BPM | WEIGHT: 210 LBS | OXYGEN SATURATION: 100 %

## 2017-05-03 LAB
ALBUMIN SERPL-MCNC: 4.2 G/DL (ref 3.5–5.2)
ALBUMIN/GLOBULIN RATIO: 1.2 (ref 1–2.5)
ALP BLD-CCNC: 102 U/L (ref 35–104)
ALT SERPL-CCNC: 12 U/L (ref 5–33)
ANION GAP SERPL CALCULATED.3IONS-SCNC: 19 MMOL/L (ref 9–17)
AST SERPL-CCNC: 14 U/L
BILIRUB SERPL-MCNC: 0.39 MG/DL (ref 0.3–1.2)
BILIRUBIN DIRECT: 0.09 MG/DL
BILIRUBIN, INDIRECT: 0.3 MG/DL (ref 0–1)
BUN BLDV-MCNC: 8 MG/DL (ref 6–20)
BUN/CREAT BLD: ABNORMAL (ref 9–20)
CALCIUM SERPL-MCNC: 9.7 MG/DL (ref 8.6–10.4)
CHLORIDE BLD-SCNC: 101 MMOL/L (ref 98–107)
CO2: 19 MMOL/L (ref 20–31)
CREAT SERPL-MCNC: 0.8 MG/DL (ref 0.5–0.9)
D-DIMER QUANTITATIVE: 0.43 MG/L FEU
GFR AFRICAN AMERICAN: >60 ML/MIN
GFR NON-AFRICAN AMERICAN: >60 ML/MIN
GFR SERPL CREATININE-BSD FRML MDRD: ABNORMAL ML/MIN/{1.73_M2}
GFR SERPL CREATININE-BSD FRML MDRD: ABNORMAL ML/MIN/{1.73_M2}
GLOBULIN: NORMAL G/DL (ref 1.5–3.8)
GLUCOSE BLD-MCNC: 121 MG/DL (ref 70–99)
HCT VFR BLD CALC: 37.5 % (ref 36–46)
HEMOGLOBIN: 12.4 G/DL (ref 12–16)
LIPASE: 34 U/L (ref 13–60)
MCH RBC QN AUTO: 26 PG (ref 26–34)
MCHC RBC AUTO-ENTMCNC: 33.1 G/DL (ref 31–37)
MCV RBC AUTO: 78.6 FL (ref 80–100)
PDW BLD-RTO: 17.7 % (ref 12.5–15.4)
PLATELET # BLD: 277 K/UL (ref 140–450)
PMV BLD AUTO: 8.8 FL (ref 6–12)
POC TROPONIN I: 0 NG/ML (ref 0–0.1)
POC TROPONIN I: 0 NG/ML (ref 0–0.1)
POC TROPONIN INTERP: NORMAL
POC TROPONIN INTERP: NORMAL
POTASSIUM SERPL-SCNC: 3.6 MMOL/L (ref 3.7–5.3)
RBC # BLD: 4.78 M/UL (ref 4–5.2)
SODIUM BLD-SCNC: 139 MMOL/L (ref 135–144)
TOTAL PROTEIN: 7.6 G/DL (ref 6.4–8.3)
WBC # BLD: 6.5 K/UL (ref 3.5–11)

## 2017-05-03 PROCEDURE — 6370000000 HC RX 637 (ALT 250 FOR IP): Performed by: EMERGENCY MEDICINE

## 2017-05-03 PROCEDURE — 71020 XR CHEST STANDARD TWO VW: CPT

## 2017-05-03 PROCEDURE — 84484 ASSAY OF TROPONIN QUANT: CPT

## 2017-05-03 RX ORDER — ACETAMINOPHEN 500 MG
1000 TABLET ORAL ONCE
Status: COMPLETED | OUTPATIENT
Start: 2017-05-03 | End: 2017-05-03

## 2017-05-03 RX ORDER — ASPIRIN 81 MG/1
324 TABLET, CHEWABLE ORAL ONCE
Status: COMPLETED | OUTPATIENT
Start: 2017-05-03 | End: 2017-05-03

## 2017-05-03 RX ADMIN — ASPIRIN 81 MG 324 MG: 81 TABLET ORAL at 02:31

## 2017-05-03 RX ADMIN — ACETAMINOPHEN 1000 MG: 500 TABLET ORAL at 01:11

## 2017-05-03 ASSESSMENT — ENCOUNTER SYMPTOMS
WHEEZING: 0
ABDOMINAL PAIN: 1
SHORTNESS OF BREATH: 1
COUGH: 1
SORE THROAT: 0
ABDOMINAL DISTENTION: 0
DIARRHEA: 0
RHINORRHEA: 0
VOMITING: 0
CONSTIPATION: 0
NAUSEA: 0

## 2017-05-03 ASSESSMENT — PAIN SCALES - GENERAL: PAINLEVEL_OUTOF10: 8

## 2017-05-04 LAB
EKG ATRIAL RATE: 105 BPM
EKG P AXIS: 39 DEGREES
EKG P-R INTERVAL: 150 MS
EKG Q-T INTERVAL: 350 MS
EKG QRS DURATION: 72 MS
EKG QTC CALCULATION (BAZETT): 462 MS
EKG R AXIS: 7 DEGREES
EKG T AXIS: 31 DEGREES
EKG VENTRICULAR RATE: 105 BPM

## 2017-05-13 ENCOUNTER — APPOINTMENT (OUTPATIENT)
Dept: GENERAL RADIOLOGY | Age: 42
End: 2017-05-13
Payer: MEDICARE

## 2017-05-13 ENCOUNTER — HOSPITAL ENCOUNTER (EMERGENCY)
Age: 42
Discharge: HOME | End: 2017-05-13
Attending: EMERGENCY MEDICINE
Payer: MEDICARE

## 2017-05-13 VITALS
HEIGHT: 63 IN | OXYGEN SATURATION: 97 % | SYSTOLIC BLOOD PRESSURE: 139 MMHG | TEMPERATURE: 98.1 F | DIASTOLIC BLOOD PRESSURE: 103 MMHG | WEIGHT: 209 LBS | HEART RATE: 85 BPM | RESPIRATION RATE: 16 BRPM | BODY MASS INDEX: 37.03 KG/M2

## 2017-05-13 DIAGNOSIS — S93.402A SPRAIN OF LEFT ANKLE, UNSPECIFIED LIGAMENT, INITIAL ENCOUNTER: Primary | ICD-10-CM

## 2017-05-13 PROCEDURE — G0382 LEV 3 HOSP TYPE B ED VISIT: HCPCS

## 2017-05-13 PROCEDURE — 73610 X-RAY EXAM OF ANKLE: CPT

## 2017-05-13 PROCEDURE — 6370000000 HC RX 637 (ALT 250 FOR IP): Performed by: PHYSICIAN ASSISTANT

## 2017-05-13 RX ORDER — ACETAMINOPHEN 325 MG/1
650 TABLET ORAL EVERY 6 HOURS PRN
Qty: 20 TABLET | Refills: 0 | Status: SHIPPED | OUTPATIENT
Start: 2017-05-13 | End: 2017-09-11

## 2017-05-13 RX ORDER — HYDROCODONE BITARTRATE AND ACETAMINOPHEN 5; 325 MG/1; MG/1
1 TABLET ORAL ONCE
Status: COMPLETED | OUTPATIENT
Start: 2017-05-13 | End: 2017-05-13

## 2017-05-13 RX ADMIN — HYDROCODONE BITARTRATE AND ACETAMINOPHEN 1 TABLET: 5; 325 TABLET ORAL at 13:21

## 2017-05-13 ASSESSMENT — ENCOUNTER SYMPTOMS
NAUSEA: 0
ABDOMINAL PAIN: 0
WHEEZING: 0
COUGH: 0
VOMITING: 0

## 2017-05-13 ASSESSMENT — PAIN DESCRIPTION - DESCRIPTORS: DESCRIPTORS: SHARP;SORE;TENDER

## 2017-05-13 ASSESSMENT — PAIN SCALES - GENERAL
PAINLEVEL_OUTOF10: 10
PAINLEVEL_OUTOF10: 10

## 2017-05-13 ASSESSMENT — PAIN DESCRIPTION - ORIENTATION: ORIENTATION: LEFT

## 2017-05-13 ASSESSMENT — PAIN DESCRIPTION - LOCATION: LOCATION: FOOT

## 2017-05-17 ENCOUNTER — TELEPHONE (OUTPATIENT)
Dept: INTERNAL MEDICINE | Age: 42
End: 2017-05-17

## 2017-05-17 ENCOUNTER — OFFICE VISIT (OUTPATIENT)
Dept: INTERNAL MEDICINE | Age: 42
End: 2017-05-17
Payer: MEDICARE

## 2017-05-17 VITALS
HEART RATE: 97 BPM | SYSTOLIC BLOOD PRESSURE: 150 MMHG | TEMPERATURE: 98.7 F | HEIGHT: 63 IN | WEIGHT: 216.2 LBS | BODY MASS INDEX: 38.31 KG/M2 | DIASTOLIC BLOOD PRESSURE: 98 MMHG

## 2017-05-17 DIAGNOSIS — M54.50 CHRONIC BILATERAL LOW BACK PAIN WITHOUT SCIATICA: ICD-10-CM

## 2017-05-17 DIAGNOSIS — G89.29 CHRONIC BILATERAL LOW BACK PAIN WITHOUT SCIATICA: ICD-10-CM

## 2017-05-17 DIAGNOSIS — J20.8 ACUTE BRONCHITIS DUE TO OTHER SPECIFIED ORGANISMS: Primary | ICD-10-CM

## 2017-05-17 DIAGNOSIS — F41.9 ANXIETY: ICD-10-CM

## 2017-05-17 DIAGNOSIS — E11.9 TYPE 2 DIABETES MELLITUS WITHOUT COMPLICATION, WITHOUT LONG-TERM CURRENT USE OF INSULIN (HCC): ICD-10-CM

## 2017-05-17 PROCEDURE — G8417 CALC BMI ABV UP PARAM F/U: HCPCS | Performed by: INTERNAL MEDICINE

## 2017-05-17 PROCEDURE — 99212 OFFICE O/P EST SF 10 MIN: CPT

## 2017-05-17 PROCEDURE — 99214 OFFICE O/P EST MOD 30 MIN: CPT | Performed by: INTERNAL MEDICINE

## 2017-05-17 PROCEDURE — G8427 DOCREV CUR MEDS BY ELIG CLIN: HCPCS | Performed by: INTERNAL MEDICINE

## 2017-05-17 PROCEDURE — 4004F PT TOBACCO SCREEN RCVD TLK: CPT | Performed by: INTERNAL MEDICINE

## 2017-05-17 PROCEDURE — 3044F HG A1C LEVEL LT 7.0%: CPT | Performed by: INTERNAL MEDICINE

## 2017-05-17 RX ORDER — AMLODIPINE BESYLATE 5 MG/1
5 TABLET ORAL DAILY
Qty: 30 TABLET | Refills: 3 | Status: SHIPPED | OUTPATIENT
Start: 2017-05-17 | End: 2017-11-01 | Stop reason: SDUPTHER

## 2017-05-17 RX ORDER — DEXTROAMPHETAMINE SACCHARATE, AMPHETAMINE ASPARTATE MONOHYDRATE, DEXTROAMPHETAMINE SULFATE AND AMPHETAMINE SULFATE 5; 5; 5; 5 MG/1; MG/1; MG/1; MG/1
CAPSULE, EXTENDED RELEASE ORAL
Refills: 0 | COMMUNITY
Start: 2017-05-02 | End: 2020-01-16 | Stop reason: DRUGHIGH

## 2017-05-17 RX ORDER — ONDANSETRON 4 MG/1
4 TABLET, ORALLY DISINTEGRATING ORAL EVERY 8 HOURS PRN
Qty: 12 TABLET | Refills: 0 | Status: SHIPPED | OUTPATIENT
Start: 2017-05-17 | End: 2018-01-09 | Stop reason: ALTCHOICE

## 2017-05-17 RX ORDER — GUAIFENESIN/DEXTROMETHORPHAN 100-10MG/5
5 SYRUP ORAL 3 TIMES DAILY PRN
Qty: 120 ML | Refills: 1 | Status: SHIPPED | OUTPATIENT
Start: 2017-05-17 | End: 2017-05-27

## 2017-05-17 ASSESSMENT — ENCOUNTER SYMPTOMS
ABDOMINAL PAIN: 1
BACK PAIN: 1
DIARRHEA: 1
SHORTNESS OF BREATH: 1
COUGH: 1
NAUSEA: 1
VOMITING: 1

## 2017-05-18 ENCOUNTER — HOSPITAL ENCOUNTER (OUTPATIENT)
Dept: GENERAL RADIOLOGY | Age: 42
Discharge: HOME | End: 2017-05-18
Payer: MEDICARE

## 2017-05-18 ENCOUNTER — TELEPHONE (OUTPATIENT)
Dept: INTERNAL MEDICINE | Age: 42
End: 2017-05-18

## 2017-05-18 ENCOUNTER — HOSPITAL ENCOUNTER (OUTPATIENT)
Age: 42
Discharge: HOME | End: 2017-05-18
Payer: MEDICARE

## 2017-05-18 DIAGNOSIS — E11.9 TYPE 2 DIABETES MELLITUS WITHOUT COMPLICATION, WITHOUT LONG-TERM CURRENT USE OF INSULIN (HCC): ICD-10-CM

## 2017-05-18 DIAGNOSIS — J20.8 ACUTE BRONCHITIS DUE TO OTHER SPECIFIED ORGANISMS: ICD-10-CM

## 2017-05-18 LAB
ESTIMATED AVERAGE GLUCOSE: 128 MG/DL
HBA1C MFR BLD: 6.1 % (ref 4–6)

## 2017-05-18 PROCEDURE — 36415 COLL VENOUS BLD VENIPUNCTURE: CPT

## 2017-05-18 PROCEDURE — 71020 XR CHEST STANDARD TWO VW: CPT

## 2017-05-18 PROCEDURE — 83036 HEMOGLOBIN GLYCOSYLATED A1C: CPT

## 2017-06-17 ENCOUNTER — HOSPITAL ENCOUNTER (EMERGENCY)
Age: 42
Discharge: HOME | End: 2017-06-17
Attending: EMERGENCY MEDICINE
Payer: MEDICARE

## 2017-06-17 VITALS
SYSTOLIC BLOOD PRESSURE: 118 MMHG | TEMPERATURE: 98.3 F | WEIGHT: 190 LBS | HEART RATE: 107 BPM | DIASTOLIC BLOOD PRESSURE: 88 MMHG | BODY MASS INDEX: 33.66 KG/M2 | HEIGHT: 63 IN | RESPIRATION RATE: 16 BRPM | OXYGEN SATURATION: 97 %

## 2017-06-17 DIAGNOSIS — F10.920 ACUTE ALCOHOLIC INTOXICATION, UNCOMPLICATED (HCC): Primary | ICD-10-CM

## 2017-06-17 LAB
ETHANOL PERCENT: 0.15 %
ETHANOL: 154 MG/DL

## 2017-06-17 PROCEDURE — 99283 EMERGENCY DEPT VISIT LOW MDM: CPT

## 2017-06-17 PROCEDURE — G0480 DRUG TEST DEF 1-7 CLASSES: HCPCS

## 2017-06-17 PROCEDURE — 36415 COLL VENOUS BLD VENIPUNCTURE: CPT

## 2017-06-17 ASSESSMENT — SLEEP AND FATIGUE QUESTIONNAIRES
DIFFICULTY STAYING ASLEEP: NO
SLEEP PATTERN: NIGHTMARES/TERRORS
DIFFICULTY ARISING: NO
DIFFICULTY FALLING ASLEEP: NO
DO YOU USE A SLEEP AID: YES
RESTFUL SLEEP: YES
AVERAGE NUMBER OF SLEEP HOURS: 8

## 2017-06-17 ASSESSMENT — ENCOUNTER SYMPTOMS
COUGH: 0
EYE REDNESS: 0
NAUSEA: 0
COLOR CHANGE: 0
EYE DISCHARGE: 0
DIARRHEA: 0
SORE THROAT: 0
VOMITING: 0
SHORTNESS OF BREATH: 0
RHINORRHEA: 0

## 2017-06-17 ASSESSMENT — PAIN SCALES - GENERAL: PAINLEVEL_OUTOF10: 8

## 2017-06-17 ASSESSMENT — PAIN DESCRIPTION - LOCATION: LOCATION: BACK

## 2017-06-17 ASSESSMENT — PAIN DESCRIPTION - ORIENTATION: ORIENTATION: LEFT;LOWER

## 2017-06-17 ASSESSMENT — LIFESTYLE VARIABLES: HISTORY_ALCOHOL_USE: NO

## 2017-06-17 ASSESSMENT — PAIN DESCRIPTION - PAIN TYPE: TYPE: ACUTE PAIN

## 2017-06-18 ENCOUNTER — HOSPITAL ENCOUNTER (EMERGENCY)
Age: 42
Discharge: HOME | End: 2017-06-19
Attending: EMERGENCY MEDICINE
Payer: MEDICARE

## 2017-06-18 VITALS
OXYGEN SATURATION: 98 % | TEMPERATURE: 97.5 F | RESPIRATION RATE: 17 BRPM | DIASTOLIC BLOOD PRESSURE: 79 MMHG | HEART RATE: 83 BPM | SYSTOLIC BLOOD PRESSURE: 116 MMHG

## 2017-06-18 DIAGNOSIS — R19.7 DIARRHEA, UNSPECIFIED TYPE: ICD-10-CM

## 2017-06-18 DIAGNOSIS — R10.30 LOWER ABDOMINAL PAIN: Primary | ICD-10-CM

## 2017-06-18 LAB
ABSOLUTE EOS #: 0.2 K/UL (ref 0–0.4)
ABSOLUTE LYMPH #: 2.4 K/UL (ref 1–4.8)
ABSOLUTE MONO #: 0.4 K/UL (ref 0.1–1.2)
ALBUMIN SERPL-MCNC: 4.2 G/DL (ref 3.5–5.2)
ALBUMIN/GLOBULIN RATIO: 1.4 (ref 1–2.5)
ALP BLD-CCNC: 102 U/L (ref 35–104)
ALT SERPL-CCNC: 10 U/L (ref 5–33)
ANION GAP SERPL CALCULATED.3IONS-SCNC: 13 MMOL/L (ref 9–17)
AST SERPL-CCNC: 12 U/L
BASOPHILS # BLD: 1 %
BASOPHILS ABSOLUTE: 0 K/UL (ref 0–0.2)
BILIRUB SERPL-MCNC: 0.21 MG/DL (ref 0.3–1.2)
BILIRUBIN DIRECT: <0.08 MG/DL
BILIRUBIN, INDIRECT: ABNORMAL MG/DL (ref 0–1)
BUN BLDV-MCNC: 9 MG/DL (ref 6–20)
BUN/CREAT BLD: ABNORMAL (ref 9–20)
CALCIUM SERPL-MCNC: 9.2 MG/DL (ref 8.6–10.4)
CHLORIDE BLD-SCNC: 100 MMOL/L (ref 98–107)
CO2: 24 MMOL/L (ref 20–31)
CREAT SERPL-MCNC: 1.08 MG/DL (ref 0.5–0.9)
DATE, STOOL #1: NORMAL
DATE, STOOL #2: NORMAL
DATE, STOOL #3: NORMAL
DIFFERENTIAL TYPE: ABNORMAL
DIRECT EXAM: NORMAL
DIRECT EXAM: NORMAL
EOSINOPHILS RELATIVE PERCENT: 2 %
GFR AFRICAN AMERICAN: >60 ML/MIN
GFR NON-AFRICAN AMERICAN: 56 ML/MIN
GFR SERPL CREATININE-BSD FRML MDRD: ABNORMAL ML/MIN/{1.73_M2}
GFR SERPL CREATININE-BSD FRML MDRD: ABNORMAL ML/MIN/{1.73_M2}
GLOBULIN: ABNORMAL G/DL (ref 1.5–3.8)
GLUCOSE BLD-MCNC: 123 MG/DL (ref 70–99)
HCG QUALITATIVE: NEGATIVE
HCT VFR BLD CALC: 35.6 % (ref 36–46)
HEMOCCULT SP1 STL QL: NEGATIVE
HEMOCCULT SP2 STL QL: NORMAL
HEMOCCULT SP3 STL QL: NORMAL
HEMOGLOBIN: 12.1 G/DL (ref 12–16)
LACTIC ACID, WHOLE BLOOD: 1.6 MMOL/L (ref 0.7–2.1)
LIPASE: 31 U/L (ref 13–60)
LYMPHOCYTES # BLD: 35 %
Lab: NORMAL
MCH RBC QN AUTO: 26.7 PG (ref 26–34)
MCHC RBC AUTO-ENTMCNC: 34 G/DL (ref 31–37)
MCV RBC AUTO: 78.7 FL (ref 80–100)
MONOCYTES # BLD: 6 %
PDW BLD-RTO: 16.7 % (ref 12.5–15.4)
PLATELET # BLD: 249 K/UL (ref 140–450)
PLATELET ESTIMATE: ABNORMAL
PMV BLD AUTO: 8.7 FL (ref 6–12)
POTASSIUM SERPL-SCNC: 3.7 MMOL/L (ref 3.7–5.3)
RBC # BLD: 4.52 M/UL (ref 4–5.2)
RBC # BLD: ABNORMAL 10*6/UL
SEG NEUTROPHILS: 56 %
SEGMENTED NEUTROPHILS ABSOLUTE COUNT: 3.9 K/UL (ref 1.8–7.7)
SODIUM BLD-SCNC: 137 MMOL/L (ref 135–144)
SPECIMEN DESCRIPTION: NORMAL
STATUS: NORMAL
TIME, STOOL #1: NORMAL
TIME, STOOL #2: NORMAL
TIME, STOOL #3: NORMAL
TOTAL PROTEIN: 7.1 G/DL (ref 6.4–8.3)
WBC # BLD: 6.9 K/UL (ref 3.5–11)
WBC # BLD: ABNORMAL 10*3/UL

## 2017-06-18 PROCEDURE — 6360000002 HC RX W HCPCS: Performed by: EMERGENCY MEDICINE

## 2017-06-18 PROCEDURE — 80076 HEPATIC FUNCTION PANEL: CPT

## 2017-06-18 PROCEDURE — 83605 ASSAY OF LACTIC ACID: CPT

## 2017-06-18 PROCEDURE — 99284 EMERGENCY DEPT VISIT MOD MDM: CPT

## 2017-06-18 PROCEDURE — 80048 BASIC METABOLIC PNL TOTAL CA: CPT

## 2017-06-18 PROCEDURE — 87425 ROTAVIRUS AG IA: CPT

## 2017-06-18 PROCEDURE — 87329 GIARDIA AG IA: CPT

## 2017-06-18 PROCEDURE — 87505 NFCT AGENT DETECTION GI: CPT

## 2017-06-18 PROCEDURE — 96374 THER/PROPH/DIAG INJ IV PUSH: CPT

## 2017-06-18 PROCEDURE — 96375 TX/PRO/DX INJ NEW DRUG ADDON: CPT

## 2017-06-18 PROCEDURE — 83690 ASSAY OF LIPASE: CPT

## 2017-06-18 PROCEDURE — 87493 C DIFF AMPLIFIED PROBE: CPT

## 2017-06-18 PROCEDURE — 85025 COMPLETE CBC W/AUTO DIFF WBC: CPT

## 2017-06-18 PROCEDURE — 82272 OCCULT BLD FECES 1-3 TESTS: CPT

## 2017-06-18 PROCEDURE — 84703 CHORIONIC GONADOTROPIN ASSAY: CPT

## 2017-06-18 PROCEDURE — 2580000003 HC RX 258: Performed by: EMERGENCY MEDICINE

## 2017-06-18 RX ORDER — ONDANSETRON 2 MG/ML
4 INJECTION INTRAMUSCULAR; INTRAVENOUS ONCE
Status: COMPLETED | OUTPATIENT
Start: 2017-06-18 | End: 2017-06-18

## 2017-06-18 RX ORDER — 0.9 % SODIUM CHLORIDE 0.9 %
1000 INTRAVENOUS SOLUTION INTRAVENOUS ONCE
Status: COMPLETED | OUTPATIENT
Start: 2017-06-18 | End: 2017-06-19

## 2017-06-18 RX ORDER — MORPHINE SULFATE 4 MG/ML
4 INJECTION, SOLUTION INTRAMUSCULAR; INTRAVENOUS ONCE
Status: COMPLETED | OUTPATIENT
Start: 2017-06-18 | End: 2017-06-18

## 2017-06-18 RX ADMIN — MORPHINE SULFATE 4 MG: 4 INJECTION, SOLUTION INTRAMUSCULAR; INTRAVENOUS at 22:53

## 2017-06-18 RX ADMIN — ONDANSETRON 4 MG: 2 INJECTION INTRAMUSCULAR; INTRAVENOUS at 22:52

## 2017-06-18 RX ADMIN — SODIUM CHLORIDE 1000 ML: 9 INJECTION, SOLUTION INTRAVENOUS at 22:52

## 2017-06-18 ASSESSMENT — PAIN DESCRIPTION - DESCRIPTORS: DESCRIPTORS: CRAMPING

## 2017-06-18 ASSESSMENT — PAIN SCALES - GENERAL
PAINLEVEL_OUTOF10: 8
PAINLEVEL_OUTOF10: 9
PAINLEVEL_OUTOF10: 5

## 2017-06-18 ASSESSMENT — PAIN DESCRIPTION - LOCATION: LOCATION: ABDOMEN

## 2017-06-18 ASSESSMENT — PAIN DESCRIPTION - ORIENTATION: ORIENTATION: LOWER

## 2017-06-19 LAB
C DIFFICILE TOXINS, PCR: NORMAL
CAMPYLOBACTER PCR: NORMAL
DIRECT EXAM: NORMAL
Lab: NORMAL
SALMONELLA PCR: NORMAL
SHIGATOXIN GENE PCR: NORMAL
SHIGELLA SP PCR: NORMAL
SPECIMEN DESCRIPTION: NORMAL
SPECIMEN DESCRIPTION: NORMAL
SPECIMEN: NORMAL
STATUS: NORMAL

## 2017-06-19 PROCEDURE — 96376 TX/PRO/DX INJ SAME DRUG ADON: CPT

## 2017-06-19 PROCEDURE — 6360000002 HC RX W HCPCS: Performed by: EMERGENCY MEDICINE

## 2017-06-19 RX ORDER — MORPHINE SULFATE 4 MG/ML
4 INJECTION, SOLUTION INTRAMUSCULAR; INTRAVENOUS ONCE
Status: COMPLETED | OUTPATIENT
Start: 2017-06-19 | End: 2017-06-19

## 2017-06-19 RX ADMIN — MORPHINE SULFATE 4 MG: 4 INJECTION, SOLUTION INTRAMUSCULAR; INTRAVENOUS at 00:19

## 2017-06-19 ASSESSMENT — ENCOUNTER SYMPTOMS
DIARRHEA: 1
NAUSEA: 1
BLOOD IN STOOL: 1
ABDOMINAL PAIN: 1
VOMITING: 1
COUGH: 0
SORE THROAT: 0
SHORTNESS OF BREATH: 0

## 2017-06-19 ASSESSMENT — PAIN SCALES - GENERAL
PAINLEVEL_OUTOF10: 4
PAINLEVEL_OUTOF10: 7

## 2017-07-05 ENCOUNTER — TELEPHONE (OUTPATIENT)
Dept: GASTROENTEROLOGY | Age: 42
End: 2017-07-05

## 2017-07-10 ENCOUNTER — HOSPITAL ENCOUNTER (OUTPATIENT)
Dept: PAIN MANAGEMENT | Age: 42
Discharge: HOME | End: 2017-07-10
Payer: MEDICARE

## 2017-07-10 VITALS
RESPIRATION RATE: 16 BRPM | TEMPERATURE: 98.4 F | SYSTOLIC BLOOD PRESSURE: 143 MMHG | DIASTOLIC BLOOD PRESSURE: 109 MMHG | HEART RATE: 85 BPM

## 2017-07-10 DIAGNOSIS — M47.816 FACET DEGENERATION OF LUMBAR REGION: Primary | Chronic | ICD-10-CM

## 2017-07-10 PROCEDURE — 99215 OFFICE O/P EST HI 40 MIN: CPT

## 2017-07-10 ASSESSMENT — ENCOUNTER SYMPTOMS
BACK PAIN: 1
COUGH: 0
SHORTNESS OF BREATH: 0
CONSTIPATION: 0

## 2017-07-10 ASSESSMENT — PAIN DESCRIPTION - LOCATION: LOCATION: BACK

## 2017-07-10 ASSESSMENT — PAIN DESCRIPTION - ONSET: ONSET: ON-GOING

## 2017-07-10 ASSESSMENT — PAIN DESCRIPTION - ORIENTATION: ORIENTATION: LOWER

## 2017-07-10 ASSESSMENT — PAIN DESCRIPTION - PAIN TYPE: TYPE: CHRONIC PAIN

## 2017-07-10 ASSESSMENT — PAIN DESCRIPTION - PROGRESSION: CLINICAL_PROGRESSION: GRADUALLY IMPROVING

## 2017-07-10 ASSESSMENT — PAIN DESCRIPTION - DESCRIPTORS: DESCRIPTORS: CONSTANT;SPASM

## 2017-07-10 ASSESSMENT — PAIN DESCRIPTION - FREQUENCY: FREQUENCY: CONTINUOUS

## 2017-07-10 ASSESSMENT — PAIN SCALES - GENERAL: PAINLEVEL_OUTOF10: 8

## 2017-07-13 ENCOUNTER — OFFICE VISIT (OUTPATIENT)
Dept: INTERNAL MEDICINE | Age: 42
End: 2017-07-13
Payer: MEDICARE

## 2017-07-13 VITALS
SYSTOLIC BLOOD PRESSURE: 145 MMHG | RESPIRATION RATE: 18 BRPM | HEART RATE: 76 BPM | DIASTOLIC BLOOD PRESSURE: 93 MMHG | BODY MASS INDEX: 37.73 KG/M2 | WEIGHT: 213 LBS

## 2017-07-13 DIAGNOSIS — E11.9 TYPE 2 DIABETES MELLITUS WITHOUT COMPLICATION, WITHOUT LONG-TERM CURRENT USE OF INSULIN (HCC): Primary | ICD-10-CM

## 2017-07-13 DIAGNOSIS — R07.81 RIB PAIN ON RIGHT SIDE: ICD-10-CM

## 2017-07-13 DIAGNOSIS — K92.1 HEMATOCHEZIA: ICD-10-CM

## 2017-07-13 PROCEDURE — G8417 CALC BMI ABV UP PARAM F/U: HCPCS | Performed by: INTERNAL MEDICINE

## 2017-07-13 PROCEDURE — 4004F PT TOBACCO SCREEN RCVD TLK: CPT | Performed by: INTERNAL MEDICINE

## 2017-07-13 PROCEDURE — 99213 OFFICE O/P EST LOW 20 MIN: CPT

## 2017-07-13 PROCEDURE — G8427 DOCREV CUR MEDS BY ELIG CLIN: HCPCS | Performed by: INTERNAL MEDICINE

## 2017-07-13 PROCEDURE — 3046F HEMOGLOBIN A1C LEVEL >9.0%: CPT | Performed by: INTERNAL MEDICINE

## 2017-07-13 PROCEDURE — 99213 OFFICE O/P EST LOW 20 MIN: CPT | Performed by: INTERNAL MEDICINE

## 2017-07-13 RX ORDER — VORTIOXETINE 10 MG/1
1 TABLET, FILM COATED ORAL DAILY
Refills: 0 | Status: ON HOLD | COMMUNITY
Start: 2017-07-10 | End: 2018-08-20 | Stop reason: HOSPADM

## 2017-07-13 RX ORDER — GABAPENTIN 400 MG/1
400 CAPSULE ORAL 3 TIMES DAILY
Qty: 90 CAPSULE | Refills: 6 | Status: SHIPPED | OUTPATIENT
Start: 2017-07-13 | End: 2018-06-04 | Stop reason: SDUPTHER

## 2017-07-13 RX ORDER — CYCLOBENZAPRINE HCL 5 MG
5 TABLET ORAL 3 TIMES DAILY PRN
Qty: 40 TABLET | Refills: 1 | Status: SHIPPED | OUTPATIENT
Start: 2017-07-13 | End: 2017-10-23 | Stop reason: SDUPTHER

## 2017-07-13 ASSESSMENT — ENCOUNTER SYMPTOMS
ALLERGIC/IMMUNOLOGIC NEGATIVE: 1
BACK PAIN: 1
RESPIRATORY NEGATIVE: 1
ABDOMINAL PAIN: 1
EYES NEGATIVE: 1
BLOOD IN STOOL: 1

## 2017-07-25 ENCOUNTER — HOSPITAL ENCOUNTER (EMERGENCY)
Age: 42
Discharge: HOME | End: 2017-07-26
Attending: EMERGENCY MEDICINE
Payer: MEDICARE

## 2017-07-25 ENCOUNTER — APPOINTMENT (OUTPATIENT)
Dept: GENERAL RADIOLOGY | Age: 42
End: 2017-07-25
Payer: MEDICARE

## 2017-07-25 VITALS
HEART RATE: 81 BPM | BODY MASS INDEX: 38.09 KG/M2 | DIASTOLIC BLOOD PRESSURE: 98 MMHG | OXYGEN SATURATION: 98 % | SYSTOLIC BLOOD PRESSURE: 119 MMHG | HEIGHT: 63 IN | WEIGHT: 215 LBS | TEMPERATURE: 98.2 F | RESPIRATION RATE: 17 BRPM

## 2017-07-25 DIAGNOSIS — R10.11 RIGHT UPPER QUADRANT ABDOMINAL PAIN: Primary | ICD-10-CM

## 2017-07-25 LAB
ABSOLUTE EOS #: 0.1 K/UL (ref 0–0.4)
ABSOLUTE LYMPH #: 2 K/UL (ref 1–4.8)
ABSOLUTE MONO #: 0.4 K/UL (ref 0.1–1.2)
ALBUMIN SERPL-MCNC: 3.7 G/DL (ref 3.5–5.2)
ALBUMIN/GLOBULIN RATIO: 1.3 (ref 1–2.5)
ALP BLD-CCNC: 86 U/L (ref 35–104)
ALT SERPL-CCNC: 15 U/L (ref 5–33)
ANION GAP SERPL CALCULATED.3IONS-SCNC: 13 MMOL/L (ref 9–17)
AST SERPL-CCNC: 22 U/L
BASOPHILS # BLD: 0 %
BASOPHILS ABSOLUTE: 0 K/UL (ref 0–0.2)
BILIRUB SERPL-MCNC: 0.24 MG/DL (ref 0.3–1.2)
BILIRUBIN DIRECT: <0.08 MG/DL
BILIRUBIN, INDIRECT: ABNORMAL MG/DL (ref 0–1)
BUN BLDV-MCNC: 9 MG/DL (ref 6–20)
BUN/CREAT BLD: ABNORMAL (ref 9–20)
CALCIUM SERPL-MCNC: 8.9 MG/DL (ref 8.6–10.4)
CHLORIDE BLD-SCNC: 105 MMOL/L (ref 98–107)
CO2: 21 MMOL/L (ref 20–31)
CREAT SERPL-MCNC: 0.8 MG/DL (ref 0.5–0.9)
DIFFERENTIAL TYPE: ABNORMAL
EOSINOPHILS RELATIVE PERCENT: 2 %
GFR AFRICAN AMERICAN: >60 ML/MIN
GFR NON-AFRICAN AMERICAN: >60 ML/MIN
GFR SERPL CREATININE-BSD FRML MDRD: ABNORMAL ML/MIN/{1.73_M2}
GFR SERPL CREATININE-BSD FRML MDRD: ABNORMAL ML/MIN/{1.73_M2}
GLOBULIN: ABNORMAL G/DL (ref 1.5–3.8)
GLUCOSE BLD-MCNC: 110 MG/DL (ref 70–99)
HCG QUALITATIVE: NEGATIVE
HCT VFR BLD CALC: 35.1 % (ref 36–46)
HEMOGLOBIN: 11.8 G/DL (ref 12–16)
LIPASE: 29 U/L (ref 13–60)
LYMPHOCYTES # BLD: 30 %
MCH RBC QN AUTO: 26.6 PG (ref 26–34)
MCHC RBC AUTO-ENTMCNC: 33.5 G/DL (ref 31–37)
MCV RBC AUTO: 79.6 FL (ref 80–100)
MONOCYTES # BLD: 6 %
PDW BLD-RTO: 15.8 % (ref 12.5–15.4)
PLATELET # BLD: 275 K/UL (ref 140–450)
PLATELET ESTIMATE: ABNORMAL
PMV BLD AUTO: 9.4 FL (ref 6–12)
POTASSIUM SERPL-SCNC: 4.6 MMOL/L (ref 3.7–5.3)
RBC # BLD: 4.41 M/UL (ref 4–5.2)
RBC # BLD: ABNORMAL 10*6/UL
SEG NEUTROPHILS: 62 %
SEGMENTED NEUTROPHILS ABSOLUTE COUNT: 4.3 K/UL (ref 1.8–7.7)
SODIUM BLD-SCNC: 139 MMOL/L (ref 135–144)
TOTAL PROTEIN: 6.5 G/DL (ref 6.4–8.3)
WBC # BLD: 6.9 K/UL (ref 3.5–11)
WBC # BLD: ABNORMAL 10*3/UL

## 2017-07-25 PROCEDURE — 80048 BASIC METABOLIC PNL TOTAL CA: CPT

## 2017-07-25 PROCEDURE — 80076 HEPATIC FUNCTION PANEL: CPT

## 2017-07-25 PROCEDURE — 99284 EMERGENCY DEPT VISIT MOD MDM: CPT

## 2017-07-25 PROCEDURE — 84703 CHORIONIC GONADOTROPIN ASSAY: CPT

## 2017-07-25 PROCEDURE — 87086 URINE CULTURE/COLONY COUNT: CPT

## 2017-07-25 PROCEDURE — 96375 TX/PRO/DX INJ NEW DRUG ADDON: CPT

## 2017-07-25 PROCEDURE — 83690 ASSAY OF LIPASE: CPT

## 2017-07-25 PROCEDURE — 6360000002 HC RX W HCPCS: Performed by: EMERGENCY MEDICINE

## 2017-07-25 PROCEDURE — 81001 URINALYSIS AUTO W/SCOPE: CPT

## 2017-07-25 PROCEDURE — 72100 X-RAY EXAM L-S SPINE 2/3 VWS: CPT

## 2017-07-25 PROCEDURE — 85025 COMPLETE CBC W/AUTO DIFF WBC: CPT

## 2017-07-25 PROCEDURE — 96374 THER/PROPH/DIAG INJ IV PUSH: CPT

## 2017-07-25 RX ORDER — PROMETHAZINE HYDROCHLORIDE 25 MG/ML
25 INJECTION, SOLUTION INTRAMUSCULAR; INTRAVENOUS ONCE
Status: COMPLETED | OUTPATIENT
Start: 2017-07-25 | End: 2017-07-25

## 2017-07-25 RX ORDER — MORPHINE SULFATE 4 MG/ML
4 INJECTION, SOLUTION INTRAMUSCULAR; INTRAVENOUS ONCE
Status: COMPLETED | OUTPATIENT
Start: 2017-07-25 | End: 2017-07-25

## 2017-07-25 RX ADMIN — PROMETHAZINE HYDROCHLORIDE 25 MG: 25 INJECTION INTRAMUSCULAR; INTRAVENOUS at 21:52

## 2017-07-25 RX ADMIN — MORPHINE SULFATE 4 MG: 4 INJECTION, SOLUTION INTRAMUSCULAR; INTRAVENOUS at 22:56

## 2017-07-25 ASSESSMENT — PAIN SCALES - GENERAL
PAINLEVEL_OUTOF10: 8
PAINLEVEL_OUTOF10: 7

## 2017-07-25 ASSESSMENT — PAIN DESCRIPTION - FREQUENCY: FREQUENCY: CONTINUOUS

## 2017-07-25 ASSESSMENT — PAIN DESCRIPTION - DESCRIPTORS: DESCRIPTORS: ACHING;CRAMPING;CONSTANT

## 2017-07-25 ASSESSMENT — PAIN DESCRIPTION - PAIN TYPE: TYPE: ACUTE PAIN

## 2017-07-25 ASSESSMENT — PAIN DESCRIPTION - ONSET: ONSET: SUDDEN

## 2017-07-25 ASSESSMENT — PAIN DESCRIPTION - ORIENTATION: ORIENTATION: MID

## 2017-07-25 ASSESSMENT — PAIN DESCRIPTION - LOCATION: LOCATION: ABDOMEN;BACK

## 2017-07-26 LAB
-: NORMAL
AMORPHOUS: NORMAL
BACTERIA: NORMAL
BILIRUBIN URINE: NEGATIVE
CASTS UA: NORMAL /LPF (ref 0–8)
COLOR: YELLOW
CRYSTALS, UA: NORMAL /HPF
CULTURE: NORMAL
CULTURE: NORMAL
EPITHELIAL CELLS UA: NORMAL /HPF (ref 0–5)
GLUCOSE URINE: NEGATIVE
KETONES, URINE: NEGATIVE
LEUKOCYTE ESTERASE, URINE: NEGATIVE
Lab: NORMAL
MUCUS: NORMAL
NITRITE, URINE: NEGATIVE
OTHER OBSERVATIONS UA: NORMAL
PH UA: 7 (ref 5–8)
PROTEIN UA: NEGATIVE
RBC UA: NORMAL /HPF (ref 0–4)
RENAL EPITHELIAL, UA: NORMAL /HPF
SPECIFIC GRAVITY UA: 1.01 (ref 1–1.03)
SPECIMEN DESCRIPTION: NORMAL
STATUS: NORMAL
TRICHOMONAS: NORMAL
TURBIDITY: CLEAR
URINE HGB: NEGATIVE
UROBILINOGEN, URINE: NORMAL
WBC UA: NORMAL /HPF (ref 0–5)
YEAST: NORMAL

## 2017-07-26 ASSESSMENT — ENCOUNTER SYMPTOMS
SHORTNESS OF BREATH: 0
BLOOD IN STOOL: 1
ABDOMINAL PAIN: 1
SORE THROAT: 0
COUGH: 0
VOMITING: 0
NAUSEA: 1

## 2017-08-11 ENCOUNTER — HOSPITAL ENCOUNTER (OUTPATIENT)
Dept: PAIN MANAGEMENT | Age: 42
Discharge: HOME | End: 2017-08-11
Payer: MEDICARE

## 2017-08-11 ENCOUNTER — HOSPITAL ENCOUNTER (EMERGENCY)
Age: 42
Discharge: HOME | End: 2017-08-12
Attending: EMERGENCY MEDICINE
Payer: MEDICARE

## 2017-08-11 VITALS
HEART RATE: 82 BPM | BODY MASS INDEX: 38.09 KG/M2 | OXYGEN SATURATION: 99 % | TEMPERATURE: 97.6 F | SYSTOLIC BLOOD PRESSURE: 124 MMHG | DIASTOLIC BLOOD PRESSURE: 73 MMHG | HEIGHT: 63 IN | WEIGHT: 215 LBS | RESPIRATION RATE: 16 BRPM

## 2017-08-11 DIAGNOSIS — M54.42 LEFT-SIDED LOW BACK PAIN WITH LEFT-SIDED SCIATICA, UNSPECIFIED CHRONICITY: Primary | ICD-10-CM

## 2017-08-11 DIAGNOSIS — G47.33 OSA (OBSTRUCTIVE SLEEP APNEA): ICD-10-CM

## 2017-08-11 DIAGNOSIS — G89.29 CHRONIC BACK PAIN, UNSPECIFIED BACK LOCATION, UNSPECIFIED BACK PAIN LATERALITY: ICD-10-CM

## 2017-08-11 DIAGNOSIS — M54.9 CHRONIC BACK PAIN, UNSPECIFIED BACK LOCATION, UNSPECIFIED BACK PAIN LATERALITY: ICD-10-CM

## 2017-08-11 LAB — GLUCOSE BLD-MCNC: 129 MG/DL (ref 65–105)

## 2017-08-11 PROCEDURE — 64495 INJ PARAVERT F JNT L/S 3 LEV: CPT

## 2017-08-11 PROCEDURE — 64493 INJ PARAVERT F JNT L/S 1 LEV: CPT

## 2017-08-11 PROCEDURE — 64494 INJ PARAVERT F JNT L/S 2 LEV: CPT

## 2017-08-11 PROCEDURE — 82947 ASSAY GLUCOSE BLOOD QUANT: CPT

## 2017-08-11 PROCEDURE — 2580000003 HC RX 258: Performed by: PAIN MEDICINE

## 2017-08-11 PROCEDURE — 6360000002 HC RX W HCPCS: Performed by: PAIN MEDICINE

## 2017-08-11 PROCEDURE — 99284 EMERGENCY DEPT VISIT MOD MDM: CPT

## 2017-08-11 RX ORDER — SODIUM CHLORIDE, SODIUM LACTATE, POTASSIUM CHLORIDE, CALCIUM CHLORIDE 600; 310; 30; 20 MG/100ML; MG/100ML; MG/100ML; MG/100ML
INJECTION, SOLUTION INTRAVENOUS CONTINUOUS
Status: DISCONTINUED | OUTPATIENT
Start: 2017-08-11 | End: 2017-08-12 | Stop reason: HOSPADM

## 2017-08-11 RX ORDER — MIDAZOLAM HYDROCHLORIDE 1 MG/ML
2 INJECTION INTRAMUSCULAR; INTRAVENOUS ONCE
Status: COMPLETED | OUTPATIENT
Start: 2017-08-11 | End: 2017-08-11

## 2017-08-11 RX ORDER — FENTANYL CITRATE 50 UG/ML
100 INJECTION, SOLUTION INTRAMUSCULAR; INTRAVENOUS ONCE
Status: COMPLETED | OUTPATIENT
Start: 2017-08-11 | End: 2017-08-11

## 2017-08-11 RX ADMIN — MIDAZOLAM HYDROCHLORIDE 2 MG: 1 INJECTION, SOLUTION INTRAMUSCULAR; INTRAVENOUS at 08:34

## 2017-08-11 RX ADMIN — FENTANYL CITRATE 50 MCG: 50 INJECTION, SOLUTION INTRAMUSCULAR; INTRAVENOUS at 08:42

## 2017-08-11 RX ADMIN — SODIUM CHLORIDE, POTASSIUM CHLORIDE, SODIUM LACTATE AND CALCIUM CHLORIDE: 600; 310; 30; 20 INJECTION, SOLUTION INTRAVENOUS at 07:55

## 2017-08-11 ASSESSMENT — PAIN - FUNCTIONAL ASSESSMENT: PAIN_FUNCTIONAL_ASSESSMENT: 0-10

## 2017-08-11 ASSESSMENT — PAIN SCALES - GENERAL: PAINLEVEL_OUTOF10: 8

## 2017-08-11 ASSESSMENT — PAIN DESCRIPTION - LOCATION: LOCATION: BACK

## 2017-08-11 ASSESSMENT — PAIN DESCRIPTION - DESCRIPTORS: DESCRIPTORS: CONSTANT;ACHING;BURNING;STABBING

## 2017-08-12 ENCOUNTER — APPOINTMENT (OUTPATIENT)
Dept: MRI IMAGING | Age: 42
End: 2017-08-12
Payer: MEDICARE

## 2017-08-12 VITALS
TEMPERATURE: 97.8 F | SYSTOLIC BLOOD PRESSURE: 115 MMHG | RESPIRATION RATE: 16 BRPM | WEIGHT: 210 LBS | OXYGEN SATURATION: 95 % | BODY MASS INDEX: 37.21 KG/M2 | DIASTOLIC BLOOD PRESSURE: 74 MMHG | HEART RATE: 71 BPM | HEIGHT: 63 IN

## 2017-08-12 LAB
ABSOLUTE EOS #: 0.1 K/UL (ref 0–0.4)
ABSOLUTE LYMPH #: 2.1 K/UL (ref 1–4.8)
ABSOLUTE MONO #: 0.5 K/UL (ref 0.1–1.2)
ANION GAP SERPL CALCULATED.3IONS-SCNC: 12 MMOL/L (ref 9–17)
BASOPHILS # BLD: 1 %
BASOPHILS ABSOLUTE: 0 K/UL (ref 0–0.2)
BUN BLDV-MCNC: 4 MG/DL (ref 6–20)
BUN/CREAT BLD: ABNORMAL (ref 9–20)
CALCIUM SERPL-MCNC: 8.7 MG/DL (ref 8.6–10.4)
CHLORIDE BLD-SCNC: 100 MMOL/L (ref 98–107)
CO2: 25 MMOL/L (ref 20–31)
CREAT SERPL-MCNC: 0.78 MG/DL (ref 0.5–0.9)
DIFFERENTIAL TYPE: ABNORMAL
EOSINOPHILS RELATIVE PERCENT: 2 %
GFR AFRICAN AMERICAN: >60 ML/MIN
GFR NON-AFRICAN AMERICAN: >60 ML/MIN
GFR SERPL CREATININE-BSD FRML MDRD: ABNORMAL ML/MIN/{1.73_M2}
GFR SERPL CREATININE-BSD FRML MDRD: ABNORMAL ML/MIN/{1.73_M2}
GLUCOSE BLD-MCNC: 102 MG/DL (ref 70–99)
HCG QUANTITATIVE: <1 IU/L
HCT VFR BLD CALC: 33.1 % (ref 36–46)
HEMOGLOBIN: 10.8 G/DL (ref 12–16)
LYMPHOCYTES # BLD: 37 %
MCH RBC QN AUTO: 25.9 PG (ref 26–34)
MCHC RBC AUTO-ENTMCNC: 32.6 G/DL (ref 31–37)
MCV RBC AUTO: 79.4 FL (ref 80–100)
MONOCYTES # BLD: 9 %
PDW BLD-RTO: 16.2 % (ref 12.5–15.4)
PLATELET # BLD: 246 K/UL (ref 140–450)
PLATELET ESTIMATE: ABNORMAL
PMV BLD AUTO: 8.3 FL (ref 6–12)
POTASSIUM SERPL-SCNC: 3.3 MMOL/L (ref 3.7–5.3)
RBC # BLD: 4.16 M/UL (ref 4–5.2)
RBC # BLD: ABNORMAL 10*6/UL
SEG NEUTROPHILS: 51 %
SEGMENTED NEUTROPHILS ABSOLUTE COUNT: 3.1 K/UL (ref 1.8–7.7)
SODIUM BLD-SCNC: 137 MMOL/L (ref 135–144)
WBC # BLD: 5.8 K/UL (ref 3.5–11)
WBC # BLD: ABNORMAL 10*3/UL

## 2017-08-12 PROCEDURE — 6360000002 HC RX W HCPCS: Performed by: STUDENT IN AN ORGANIZED HEALTH CARE EDUCATION/TRAINING PROGRAM

## 2017-08-12 PROCEDURE — 84702 CHORIONIC GONADOTROPIN TEST: CPT

## 2017-08-12 PROCEDURE — 80048 BASIC METABOLIC PNL TOTAL CA: CPT

## 2017-08-12 PROCEDURE — 6370000000 HC RX 637 (ALT 250 FOR IP): Performed by: STUDENT IN AN ORGANIZED HEALTH CARE EDUCATION/TRAINING PROGRAM

## 2017-08-12 PROCEDURE — 85025 COMPLETE CBC W/AUTO DIFF WBC: CPT

## 2017-08-12 PROCEDURE — 96374 THER/PROPH/DIAG INJ IV PUSH: CPT

## 2017-08-12 PROCEDURE — 72148 MRI LUMBAR SPINE W/O DYE: CPT

## 2017-08-12 RX ORDER — GABAPENTIN 400 MG/1
400 CAPSULE ORAL ONCE
Status: COMPLETED | OUTPATIENT
Start: 2017-08-12 | End: 2017-08-12

## 2017-08-12 RX ORDER — LORAZEPAM 0.5 MG/1
2 TABLET ORAL ONCE
Status: DISCONTINUED | OUTPATIENT
Start: 2017-08-12 | End: 2017-08-12

## 2017-08-12 RX ORDER — OXYCODONE HYDROCHLORIDE AND ACETAMINOPHEN 5; 325 MG/1; MG/1
1 TABLET ORAL EVERY 6 HOURS PRN
Qty: 16 TABLET | Refills: 0 | Status: SHIPPED | OUTPATIENT
Start: 2017-08-12 | End: 2017-08-17

## 2017-08-12 RX ORDER — OXYCODONE HYDROCHLORIDE AND ACETAMINOPHEN 5; 325 MG/1; MG/1
1 TABLET ORAL ONCE
Status: COMPLETED | OUTPATIENT
Start: 2017-08-12 | End: 2017-08-12

## 2017-08-12 RX ORDER — MIDAZOLAM HYDROCHLORIDE 1 MG/ML
2 INJECTION INTRAMUSCULAR; INTRAVENOUS ONCE
Status: COMPLETED | OUTPATIENT
Start: 2017-08-12 | End: 2017-08-12

## 2017-08-12 RX ADMIN — OXYCODONE HYDROCHLORIDE AND ACETAMINOPHEN 1 TABLET: 5; 325 TABLET ORAL at 00:36

## 2017-08-12 RX ADMIN — GABAPENTIN 400 MG: 400 CAPSULE ORAL at 01:03

## 2017-08-12 RX ADMIN — MIDAZOLAM HYDROCHLORIDE 2 MG: 1 INJECTION, SOLUTION INTRAMUSCULAR; INTRAVENOUS at 02:06

## 2017-08-12 ASSESSMENT — ENCOUNTER SYMPTOMS
SHORTNESS OF BREATH: 0
BACK PAIN: 1
NAUSEA: 0
COUGH: 0
EYE REDNESS: 0
DIARRHEA: 0
RHINORRHEA: 0
VOMITING: 0
ABDOMINAL PAIN: 0
EYE ITCHING: 0

## 2017-08-12 ASSESSMENT — PAIN SCALES - GENERAL
PAINLEVEL_OUTOF10: 7
PAINLEVEL_OUTOF10: 9

## 2017-08-14 RX ORDER — TIOTROPIUM BROMIDE 18 UG/1
CAPSULE ORAL; RESPIRATORY (INHALATION)
Qty: 30 CAPSULE | Refills: 6 | Status: SHIPPED | OUTPATIENT
Start: 2017-08-14 | End: 2018-06-04 | Stop reason: SDUPTHER

## 2017-08-16 ENCOUNTER — TELEPHONE (OUTPATIENT)
Dept: INTERNAL MEDICINE | Age: 42
End: 2017-08-16

## 2017-08-17 ENCOUNTER — HOSPITAL ENCOUNTER (OUTPATIENT)
Dept: PAIN MANAGEMENT | Age: 42
Discharge: HOME | End: 2017-08-17
Payer: MEDICARE

## 2017-08-17 VITALS
DIASTOLIC BLOOD PRESSURE: 79 MMHG | RESPIRATION RATE: 16 BRPM | SYSTOLIC BLOOD PRESSURE: 134 MMHG | HEART RATE: 71 BPM | TEMPERATURE: 98.2 F

## 2017-08-17 DIAGNOSIS — M47.816 FACET DEGENERATION OF LUMBAR REGION: Primary | Chronic | ICD-10-CM

## 2017-08-17 PROCEDURE — 99214 OFFICE O/P EST MOD 30 MIN: CPT

## 2017-08-17 RX ORDER — CYCLOBENZAPRINE HCL 5 MG
5 TABLET ORAL 2 TIMES DAILY PRN
Qty: 60 TABLET | Refills: 1 | Status: SHIPPED | OUTPATIENT
Start: 2017-08-17 | End: 2017-09-16

## 2017-08-17 ASSESSMENT — ENCOUNTER SYMPTOMS
WHEEZING: 0
SUSPICIOUS LESIONS: 0
EYE DISCHARGE: 0
HEMOPTYSIS: 0
UNUSUAL HAIR DISTRIBUTION: 0
BACK PAIN: 1
BLURRED VISION: 0

## 2017-08-17 ASSESSMENT — PAIN DESCRIPTION - ORIENTATION: ORIENTATION: LEFT;LOWER

## 2017-08-17 ASSESSMENT — PAIN DESCRIPTION - FREQUENCY: FREQUENCY: CONTINUOUS

## 2017-08-17 ASSESSMENT — PAIN DESCRIPTION - PROGRESSION: CLINICAL_PROGRESSION: NOT CHANGED

## 2017-08-17 ASSESSMENT — PAIN DESCRIPTION - LOCATION: LOCATION: BACK

## 2017-08-17 ASSESSMENT — PAIN DESCRIPTION - ONSET: ONSET: ON-GOING

## 2017-08-17 ASSESSMENT — PAIN DESCRIPTION - PAIN TYPE: TYPE: CHRONIC PAIN

## 2017-08-17 ASSESSMENT — PAIN SCALES - GENERAL: PAINLEVEL_OUTOF10: 6

## 2017-08-17 ASSESSMENT — PAIN DESCRIPTION - DESCRIPTORS: DESCRIPTORS: ACHING;BURNING;CONSTANT;STABBING

## 2017-08-30 ENCOUNTER — HOSPITAL ENCOUNTER (OUTPATIENT)
Dept: PHYSICAL THERAPY | Age: 42
Setting detail: THERAPIES SERIES
Discharge: HOME | End: 2017-08-30
Payer: MEDICARE

## 2017-09-11 ENCOUNTER — HOSPITAL ENCOUNTER (EMERGENCY)
Age: 42
Discharge: HOME | End: 2017-09-11
Attending: EMERGENCY MEDICINE
Payer: MEDICARE

## 2017-09-11 ENCOUNTER — APPOINTMENT (OUTPATIENT)
Dept: GENERAL RADIOLOGY | Age: 42
End: 2017-09-11
Payer: MEDICARE

## 2017-09-11 VITALS
SYSTOLIC BLOOD PRESSURE: 125 MMHG | WEIGHT: 210 LBS | OXYGEN SATURATION: 99 % | RESPIRATION RATE: 16 BRPM | HEART RATE: 85 BPM | BODY MASS INDEX: 37.2 KG/M2 | TEMPERATURE: 98.3 F | DIASTOLIC BLOOD PRESSURE: 96 MMHG

## 2017-09-11 DIAGNOSIS — S63.617A SPRAIN OF LEFT LITTLE FINGER, UNSPECIFIED SITE OF FINGER, INITIAL ENCOUNTER: Primary | ICD-10-CM

## 2017-09-11 PROCEDURE — 73130 X-RAY EXAM OF HAND: CPT

## 2017-09-11 PROCEDURE — 99284 EMERGENCY DEPT VISIT MOD MDM: CPT

## 2017-09-11 RX ORDER — ACETAMINOPHEN 325 MG/1
650 TABLET ORAL EVERY 6 HOURS PRN
Qty: 30 TABLET | Refills: 0 | Status: SHIPPED | OUTPATIENT
Start: 2017-09-11 | End: 2018-06-04 | Stop reason: SDUPTHER

## 2017-09-11 ASSESSMENT — PAIN DESCRIPTION - PAIN TYPE: TYPE: ACUTE PAIN

## 2017-09-11 ASSESSMENT — ENCOUNTER SYMPTOMS
ABDOMINAL PAIN: 0
COUGH: 0
NAUSEA: 0
WHEEZING: 0
VOMITING: 0

## 2017-09-11 ASSESSMENT — PAIN SCALES - GENERAL: PAINLEVEL_OUTOF10: 7

## 2017-09-12 ENCOUNTER — HOSPITAL ENCOUNTER (OUTPATIENT)
Age: 42
Discharge: HOME | End: 2017-09-12
Payer: MEDICARE

## 2017-09-12 DIAGNOSIS — Z12.11 SCREENING FOR COLORECTAL CANCER: Primary | ICD-10-CM

## 2017-09-12 DIAGNOSIS — Z12.12 SCREENING FOR COLORECTAL CANCER: Primary | ICD-10-CM

## 2017-09-12 LAB
CONTROL: NORMAL
HEMOCCULT STL QL: NEGATIVE

## 2017-09-12 PROCEDURE — 82274 ASSAY TEST FOR BLOOD FECAL: CPT | Performed by: INTERNAL MEDICINE

## 2017-09-14 ENCOUNTER — APPOINTMENT (OUTPATIENT)
Dept: ULTRASOUND IMAGING | Age: 42
End: 2017-09-14
Payer: MEDICARE

## 2017-09-14 ENCOUNTER — HOSPITAL ENCOUNTER (EMERGENCY)
Age: 42
Discharge: HOME | End: 2017-09-14
Attending: EMERGENCY MEDICINE
Payer: MEDICARE

## 2017-09-14 VITALS
WEIGHT: 210 LBS | HEART RATE: 102 BPM | TEMPERATURE: 97.5 F | SYSTOLIC BLOOD PRESSURE: 142 MMHG | OXYGEN SATURATION: 98 % | RESPIRATION RATE: 20 BRPM | BODY MASS INDEX: 37.2 KG/M2 | DIASTOLIC BLOOD PRESSURE: 103 MMHG

## 2017-09-14 DIAGNOSIS — N63.20 BREAST MASS, LEFT: Primary | ICD-10-CM

## 2017-09-14 PROCEDURE — 76642 ULTRASOUND BREAST LIMITED: CPT

## 2017-09-14 PROCEDURE — 99284 EMERGENCY DEPT VISIT MOD MDM: CPT

## 2017-09-14 PROCEDURE — 6370000000 HC RX 637 (ALT 250 FOR IP): Performed by: EMERGENCY MEDICINE

## 2017-09-14 RX ORDER — ACETAMINOPHEN 325 MG/1
650 TABLET ORAL ONCE
Status: COMPLETED | OUTPATIENT
Start: 2017-09-14 | End: 2017-09-14

## 2017-09-14 RX ORDER — OXYCODONE HYDROCHLORIDE 5 MG/1
5 TABLET ORAL EVERY 6 HOURS PRN
Qty: 10 TABLET | Refills: 0 | Status: SHIPPED | OUTPATIENT
Start: 2017-09-14 | End: 2017-11-01

## 2017-09-14 RX ORDER — ONDANSETRON HYDROCHLORIDE 4 MG/5ML
4 SOLUTION ORAL ONCE
Status: COMPLETED | OUTPATIENT
Start: 2017-09-14 | End: 2017-09-14

## 2017-09-14 RX ORDER — OXYCODONE HYDROCHLORIDE 5 MG/1
5 TABLET ORAL ONCE
Status: COMPLETED | OUTPATIENT
Start: 2017-09-14 | End: 2017-09-14

## 2017-09-14 RX ADMIN — OXYCODONE HYDROCHLORIDE 5 MG: 5 TABLET ORAL at 11:33

## 2017-09-14 RX ADMIN — Medication 4 MG: at 09:07

## 2017-09-14 RX ADMIN — ACETAMINOPHEN 650 MG: 325 TABLET ORAL at 09:18

## 2017-09-14 ASSESSMENT — PAIN DESCRIPTION - ORIENTATION: ORIENTATION: LEFT

## 2017-09-14 ASSESSMENT — ENCOUNTER SYMPTOMS
ABDOMINAL PAIN: 0
CONSTIPATION: 0
COLOR CHANGE: 0
VOMITING: 0
DIARRHEA: 0
SHORTNESS OF BREATH: 0
NAUSEA: 0

## 2017-09-14 ASSESSMENT — PAIN SCALES - GENERAL
PAINLEVEL_OUTOF10: 8

## 2017-09-14 ASSESSMENT — PAIN DESCRIPTION - PAIN TYPE: TYPE: ACUTE PAIN

## 2017-09-14 ASSESSMENT — PAIN DESCRIPTION - LOCATION: LOCATION: ARM;BREAST

## 2017-09-18 ENCOUNTER — HOSPITAL ENCOUNTER (OUTPATIENT)
Dept: PHYSICAL THERAPY | Facility: CLINIC | Age: 42
Setting detail: THERAPIES SERIES
Discharge: HOME | End: 2017-09-18
Payer: MEDICARE

## 2017-09-20 ENCOUNTER — OFFICE VISIT (OUTPATIENT)
Dept: SURGERY | Age: 42
End: 2017-09-20
Payer: MEDICARE

## 2017-09-20 ENCOUNTER — HOSPITAL ENCOUNTER (EMERGENCY)
Age: 42
Discharge: HOME | End: 2017-09-20
Attending: EMERGENCY MEDICINE
Payer: MEDICARE

## 2017-09-20 VITALS
HEIGHT: 63 IN | WEIGHT: 218 LBS | SYSTOLIC BLOOD PRESSURE: 163 MMHG | BODY MASS INDEX: 38.62 KG/M2 | TEMPERATURE: 98.6 F | DIASTOLIC BLOOD PRESSURE: 102 MMHG

## 2017-09-20 VITALS
HEART RATE: 111 BPM | OXYGEN SATURATION: 98 % | SYSTOLIC BLOOD PRESSURE: 148 MMHG | BODY MASS INDEX: 37.21 KG/M2 | RESPIRATION RATE: 16 BRPM | TEMPERATURE: 98.4 F | HEIGHT: 63 IN | DIASTOLIC BLOOD PRESSURE: 109 MMHG | WEIGHT: 210 LBS

## 2017-09-20 DIAGNOSIS — N63.20 LEFT BREAST MASS: Primary | ICD-10-CM

## 2017-09-20 PROCEDURE — 99213 OFFICE O/P EST LOW 20 MIN: CPT | Performed by: SURGERY

## 2017-09-20 PROCEDURE — 99283 EMERGENCY DEPT VISIT LOW MDM: CPT

## 2017-09-20 PROCEDURE — 6370000000 HC RX 637 (ALT 250 FOR IP): Performed by: EMERGENCY MEDICINE

## 2017-09-20 PROCEDURE — 99214 OFFICE O/P EST MOD 30 MIN: CPT

## 2017-09-20 RX ORDER — OXYCODONE HYDROCHLORIDE AND ACETAMINOPHEN 5; 325 MG/1; MG/1
2 TABLET ORAL ONCE
Status: COMPLETED | OUTPATIENT
Start: 2017-09-20 | End: 2017-09-20

## 2017-09-20 RX ORDER — OXYCODONE HYDROCHLORIDE AND ACETAMINOPHEN 5; 325 MG/1; MG/1
2 TABLET ORAL EVERY 6 HOURS PRN
Qty: 6 TABLET | Refills: 0 | Status: SHIPPED | OUTPATIENT
Start: 2017-09-20 | End: 2017-11-01

## 2017-09-20 RX ADMIN — OXYCODONE HYDROCHLORIDE AND ACETAMINOPHEN 2 TABLET: 5; 325 TABLET ORAL at 09:52

## 2017-09-20 ASSESSMENT — PAIN DESCRIPTION - FREQUENCY: FREQUENCY: CONTINUOUS

## 2017-09-20 ASSESSMENT — ENCOUNTER SYMPTOMS
WHEEZING: 0
ABDOMINAL DISTENTION: 0
COUGH: 0
DIARRHEA: 0
VOMITING: 0
SHORTNESS OF BREATH: 0
CONSTIPATION: 0
NAUSEA: 0
RHINORRHEA: 0
SORE THROAT: 0

## 2017-09-20 ASSESSMENT — PAIN DESCRIPTION - LOCATION: LOCATION: BREAST

## 2017-09-20 ASSESSMENT — PAIN DESCRIPTION - DESCRIPTORS: DESCRIPTORS: BURNING;PRESSURE

## 2017-09-20 ASSESSMENT — PAIN DESCRIPTION - PAIN TYPE: TYPE: ACUTE PAIN

## 2017-09-20 ASSESSMENT — PAIN SCALES - GENERAL: PAINLEVEL_OUTOF10: 8

## 2017-09-20 ASSESSMENT — PAIN DESCRIPTION - ORIENTATION: ORIENTATION: LEFT

## 2017-09-20 NOTE — PROGRESS NOTES
[facility] SURGERY CLINIC PROGRESS NOTE    Date: September 20, 2017     Subjective:  MICHELLE Cuevas is a 38 y.o. female who returns to the [facility] surgery clinic today for evaluation of her left breast mass. Patient states it continues to be painful but has not changes in size. She still continues to have intermittent nipple discharge. She denies cellulitis. She could not schedule an appointment for the diagnostic mammogram. Denies shortness of breath, difficulty breathing, chest pain, nausea/vomiting, abdominal pain. [id number] option 2    Notes reviewed, and agree with documentation in pt's chart. Objective:  Vitals:    09/20/17 1040   BP: (!) 163/102   Temp: 98.6 °F (37 °C)       GEN: Appears healthy. Alert; in no acute distress. Pleasant. HEART:s1s2  LUNGS: equal chest rise bilaterally   BREAST: right breast: no mass palpated, no nippledischarge , no cellulitis, no axillary lymphadenopathy. Left breast : mass palpated on upper outer quadrant: approximately 5 cm, no cellulitis, no nipple discharge, no axillary lympadenopathy. IMAGING:      LABS:        Assessment:    1. Left breast mass  HAILEY DIGITAL DIAGNOSTIC BILATERAL       Plan:  1. Left breast mass: Will schedule diagnostic mammogram at [address] [place] [address] Av   2. Follow up in 2 weeks to review imaging and discuss surgical biopsy  3. Patient agreed to plan above    Roger Overton  9/20/2017      Dr. Bryan Carranza was present . Attending Physician Statement  I have discussed the case with Dr Monae Duque, including pertinent history and exam findings with the resident. I have seen and examined the patient and the key elements of the encounter have been performed by me. I agree with the assessment, plan and orders as documented by the resident.       Electronically signed by Fredy Ballard DO  on 10/4/2017 at 12:46 PM

## 2017-09-20 NOTE — PATIENT INSTRUCTIONS
Thank you for letting us take care of you today. We hope all your questions were addressed. If a question was overlooked or something else comes to mind after you return home, please contact a member of your Care Team listed below. Please make sure you have a routine office visit set up to follow-up on [address]. Your Care Team at [place] [address] is Team #2  Enrique Vargas DO (Faculty)  Drew Riggs MD (Resindent)  Moisés Vance MD (Resident)  Andee Babinski, MD (Resident)  Dalia Bonilla MD (Resident)  Jean Garza MD (Resident)  BRANDT Gr, AUGUSTINA Finney, AUGUSTINA Browning ([facility])  Aracelis Milian RN, ([address] )  Lorenzo Spring, Ph.D., (Behavioral Services)  Yamini Pearce [facility] (Clinical Pharmacist)     Office phone number: [phone]    If you need to get in right away due to illness, please be advised we have \"Same Day\" appointments available Monday-Friday. Please call us at [phone] option #1 to schedule your \"Same Day\" appointment.

## 2017-09-26 ENCOUNTER — HOSPITAL ENCOUNTER (OUTPATIENT)
Dept: PHYSICAL THERAPY | Facility: CLINIC | Age: 42
Setting detail: THERAPIES SERIES
Discharge: HOME | End: 2017-09-26
Payer: MEDICARE

## 2017-09-26 PROCEDURE — G8978 MOBILITY CURRENT STATUS: HCPCS

## 2017-09-26 PROCEDURE — 97162 PT EVAL MOD COMPLEX 30 MIN: CPT

## 2017-09-26 PROCEDURE — 97110 THERAPEUTIC EXERCISES: CPT

## 2017-09-26 PROCEDURE — G8979 MOBILITY GOAL STATUS: HCPCS

## 2017-09-28 ENCOUNTER — HOSPITAL ENCOUNTER (OUTPATIENT)
Dept: MAMMOGRAPHY | Age: 42
Discharge: HOME | End: 2017-09-28
Payer: MEDICARE

## 2017-09-28 ENCOUNTER — HOSPITAL ENCOUNTER (OUTPATIENT)
Dept: ULTRASOUND IMAGING | Age: 42
Discharge: HOME | End: 2017-09-28
Payer: MEDICARE

## 2017-09-28 ENCOUNTER — HOSPITAL ENCOUNTER (OUTPATIENT)
Dept: PHYSICAL THERAPY | Facility: CLINIC | Age: 42
Setting detail: THERAPIES SERIES
Discharge: HOME | End: 2017-09-28
Payer: MEDICARE

## 2017-09-28 DIAGNOSIS — N63.20 LEFT BREAST MASS: ICD-10-CM

## 2017-09-28 PROCEDURE — 76642 ULTRASOUND BREAST LIMITED: CPT

## 2017-09-28 PROCEDURE — G0204 DX MAMMO INCL CAD BI: HCPCS

## 2017-10-03 ENCOUNTER — HOSPITAL ENCOUNTER (OUTPATIENT)
Dept: PHYSICAL THERAPY | Facility: CLINIC | Age: 42
Setting detail: THERAPIES SERIES
Discharge: HOME | End: 2017-10-03
Payer: MEDICARE

## 2017-10-03 ENCOUNTER — HOSPITAL ENCOUNTER (EMERGENCY)
Age: 42
Discharge: HOME | End: 2017-10-03
Attending: EMERGENCY MEDICINE
Payer: MEDICARE

## 2017-10-03 VITALS
BODY MASS INDEX: 38.62 KG/M2 | WEIGHT: 218 LBS | RESPIRATION RATE: 20 BRPM | HEIGHT: 63 IN | TEMPERATURE: 97.9 F | SYSTOLIC BLOOD PRESSURE: 139 MMHG | HEART RATE: 99 BPM | OXYGEN SATURATION: 98 % | DIASTOLIC BLOOD PRESSURE: 98 MMHG

## 2017-10-03 DIAGNOSIS — G89.29 CHRONIC BACK PAIN GREATER THAN 3 MONTHS DURATION: Primary | ICD-10-CM

## 2017-10-03 DIAGNOSIS — M54.9 CHRONIC BACK PAIN GREATER THAN 3 MONTHS DURATION: Primary | ICD-10-CM

## 2017-10-03 PROCEDURE — G0382 LEV 3 HOSP TYPE B ED VISIT: HCPCS

## 2017-10-03 PROCEDURE — 97110 THERAPEUTIC EXERCISES: CPT

## 2017-10-03 ASSESSMENT — ENCOUNTER SYMPTOMS
SHORTNESS OF BREATH: 0
BACK PAIN: 1
ABDOMINAL PAIN: 0
VOMITING: 0
NAUSEA: 0
COUGH: 0

## 2017-10-03 ASSESSMENT — PAIN DESCRIPTION - PAIN TYPE: TYPE: ACUTE PAIN

## 2017-10-03 ASSESSMENT — PAIN DESCRIPTION - LOCATION: LOCATION: BACK

## 2017-10-03 ASSESSMENT — PAIN SCALES - GENERAL: PAINLEVEL_OUTOF10: 10

## 2017-10-03 NOTE — FLOWSHEET NOTE
cryotherapy. Pt reports there was a mild improvement in pain following the use of the cold pack. Tolerance to exercise will continue to be monitored and progressions will be made as appropriate. [] Other:    STG: (to be met in 6 treatments)  1. ? Pain: Pt will report less than or equal to 7/10 LBP with therapeutic interventions in order to improve strength and ROM to progress tolerance to completing ADLs, sitting, and walking. 2. ? ROM: Pt will be able to perform prone lying activities 0-1 pillows under her hips in order to promote centralization of symptoms to progress to flexion based exercises and ADLs. 3. ? Function: Pt will score less than or equal to 68% on the Oswestry in order to improve tolerance to sitting, standing, walking, and lifting in order to improve tolerance to ADLs. 4. Independent with Home Exercise Programs  5. Demonstrate Knowledge of fall prevention  LTG: (to be met in 12 treatments)  1. ? Pain: Pt will report less than or equal to 6/10 LBP with sitting greater than 10 minutes, walking greater than 10 minutes, and completing activities with grandson for greater than 10 minutes without requiring a rest break in order to progress to prior level of activity. 2. ? ROM: Pt will be able to perform 5 double knee to chest exercises without radicular pain in order to improve tolerance to flexion- based ADLs. 3. ? Strength: Pt will demonstrate greater than or equal to 4/5 B hip strength and R ankle DF strength, and greater than or equal to 4+/5 knee flexion/extension strength bilaterally in order to improve ability to stand, walk, and complete ADLs efficiently. 4. ?  Function: Pt will score less than or equal to 58% on the Oswestry in order to improve tolerance to sitting, standing, walking, and lifting in order to improve tolerance to ADLs.            Patient goals: some relief; hopes therapy helps     G-Codes: Initial 9/26/17  Functional Limitation: Mobility: walking and moving around  Functional Assessment Used: Oswestry: 39/50, 78% impairment  Current Status Modifier: CL  Goal Status Modifier: CK    Pt. Education:  [x] Yes  [] No  [] Reviewed Prior HEP/Ed  Method of Education: [x] Verbal- use of ice at home  [] Demo  [] Written  Comprehension of Education:  [x] Verbalizes understanding. [] Demonstrates understanding. [] Needs review. [] Demonstrates/verbalizes HEP/Ed previously given. Plan: [x] Continue per plan of care.    [] Other:      Time In: 10:43 a            Time Out: 11:27    Electronically signed by:  Dayna Jordan, PT

## 2017-10-03 NOTE — ED AVS SNAPSHOT
Appointment with SCHEDULE, BREAST/COLON P [facility] SURGERY at [facility] ([phone])   Please arrive 15 minutes prior to appointment, bring photo ID and insurance card. [address] [facility]       10/5/2017 10:45 AM     Appointment with Brook Gonzalez PTA at [address] [facility] ([phone])   [address] [place]       10/10/2017 10:45 AM     Appointment with Rell Bustos PT at [facility] ([phone]       10/12/2017 10:45 AM     Appointment with Brook Gonzalez PTA at [facility] PT ([phone] [facility]       10/17/2017 10:45 AM     Appointment with Rell Bustos PT at [facility] ([phone])   [address]       11/1/2017 1:30 PM     Appointment with Octaviano Collier MD at [place] [address] ([phone])   Please arrive 15 minutes prior to appointment time, bring insurance card and photo ID.    [address] [facility] [address] [address]         Preventive Care        Date Due    HIV screening is recommended for all people regardless of risk factors  aged 15-65 years at least once (lifetime) who have never been HIV tested.  1/14/1992    Urine Check For Kidney Problems 5/6/2017    Cholesterol Screening 6/9/2017    Yearly Flu Vaccine (1) 9/1/2017    Tetanus Combination Vaccine (1 - Tdap) 3/23/2018 (Originally 1/14/1996)    Eye Exam By An Eye Doctor 6/13/2018 (Originally 1/14/1987)    Pap Smear 5/6/2019 (Originally 1/14/1998)    Diabetic Foot Exam 12/7/2017    Hemoglobin A1C (Test For Long-Term Glucose Control) 5/18/2018                 Care Plan Once You Return Home    This section includes instructions you will need to follow once you leave the hospital.  Your care team will discuss these with you, so you and pressure off your lower back. · Try sitting on an exercise ball. It can rock from side to side, which helps keep your back loose. · When driving, keep your knees nearly level with your hips. Sit straight, and drive with both hands on the steering wheel. Your arms should be in a slightly bent position. Reduce stress on your back through careful lifting  · Squat down, bending at the hips and knees only. If you need to, put one knee to the floor and extend your other knee in front of you, bent at a right angle (half kneeling). · Press your chest straight forward. This helps keep your upper back straight while keeping a slight arch in your low back. · Hold the load as close to your body as possible, at the level of your belly button (navel). · Use your feet to change direction, taking small steps. · Lead with your hips as you change direction. Keep your shoulders in line with your hips as you move. · Set down your load carefully, squatting with your knees and hips only. Exercise and stretch your back  · Do some exercise on most days of the week, if your doctor says it is okay. You can walk, run, swim, or cycle. · Stretch your back muscles. Here are a few exercises to try:  Renae Ovalle on your back, and gently pull one bent knee to your chest. Put that foot back on the floor, and then pull the other knee to your chest.  ¨ Do pelvic tilts. Lie on your back with your knees bent. Tighten your stomach muscles. Pull your belly button (navel) in and up toward your ribs. You should feel like your back is pressing to the floor and your hips and pelvis are slightly lifting off the floor. Hold for 6 seconds while breathing smoothly. ¨ Sit with your back flat against a wall. · Keep your core muscles strong. The muscles of your back, belly (abdomen), and buttocks support your spine. ¨ Pull in your belly and imagine pulling your navel toward your spine. Hold this for 6 seconds, then relax.  Remember to keep breathing normally as you tense your muscles. ¨ Do curl-ups. Always do them with your knees bent. Keep your low back on the floor, and curl your shoulders toward your knees using a smooth, slow motion. Keep your arms folded across your chest. If this bothers your neck, try putting your hands behind your neck (not your head), with your elbows spread apart. ¨ Lie on your back with your knees bent and your feet flat on the floor. Tighten your belly muscles, and then push with your feet and raise your buttocks up a few inches. Hold this position 6 seconds as you continue to breathe normally, then lower yourself slowly to the floor. Repeat 8 to 12 times. ¨ If you like group exercise, try Pilates or yoga. These classes have poses that strengthen the core muscles. Lead a healthy lifestyle  · Stay at a healthy weight to avoid strain on your back. · Do not smoke. Smoking increases the risk of osteoporosis, which weakens the spine. If you need help quitting, talk to your doctor about stop-smoking programs and medicines. These can increase your chances of quitting for good. Where can you learn more? Go to https://SABIApeIntegrien.Max-Wellness. org and sign in to your [employer] account. Enter L315 in the [employer] box to learn more about \"Learning About How to Have a Healthy Back. \"     If you do not have an account, please click on the \"Sign Up Now\" link. Current as of: March 21, 2017  Content Version: 11.3  © 5046-8934 [employer], [employer]. Care instructions adapted under license by [facility] ([facility]). If you have questions about a medical condition or this instruction, always ask your healthcare professional. [place] [address] any warranty or liability for your use of this information.

## 2017-10-04 ENCOUNTER — OFFICE VISIT (OUTPATIENT)
Dept: SURGERY | Age: 42
End: 2017-10-04
Payer: MEDICARE

## 2017-10-04 VITALS
DIASTOLIC BLOOD PRESSURE: 92 MMHG | SYSTOLIC BLOOD PRESSURE: 131 MMHG | BODY MASS INDEX: 38.8 KG/M2 | TEMPERATURE: 97 F | HEIGHT: 63 IN | HEART RATE: 89 BPM | WEIGHT: 219 LBS

## 2017-10-04 DIAGNOSIS — R07.81 RIB PAIN ON LEFT SIDE: ICD-10-CM

## 2017-10-04 DIAGNOSIS — G62.9 NEUROPATHY: Primary | ICD-10-CM

## 2017-10-04 DIAGNOSIS — D17.1 LIPOMA OF CHEST WALL: ICD-10-CM

## 2017-10-04 PROCEDURE — 99214 OFFICE O/P EST MOD 30 MIN: CPT | Performed by: STUDENT IN AN ORGANIZED HEALTH CARE EDUCATION/TRAINING PROGRAM

## 2017-10-04 PROCEDURE — 99213 OFFICE O/P EST LOW 20 MIN: CPT

## 2017-10-04 NOTE — ED PROVIDER NOTES
[facility] ED  eMERGENCY dEPARTMENT eNCOUnter      Pt Name: Rm Gamboa  MRN: [id number]  [place] 1975  Date of evaluation: 10/3/2017      CHIEF COMPLAINT       Chief Complaint   Patient presents with    Back Pain     pt reports she hear a \"pop\" during her physical therapy at 1045 this morning         HISTORY OF PRESENT ILLNESS    Rm Gamboa is a 38 y.o. female who presents With back pain. A history of chronic back pain, states she was at physical therapy this morning and it made her pain much worse. No relief with one Flexeril. She is in pain management but does not have anything else to take for pain other than Tylenol at this time. Last injection was nearly 2 months ago. Denies bowel or bladder changes today. Pain is like her chronic pain but more severe. No other complaints. However record review from physical therapy does show that she had pain prior to coming to physical therapy today. [employer] report was also reviewed. REVIEW OF SYSTEMS       Review of Systems   Constitutional: Negative for chills and fever. Respiratory: Negative for cough and shortness of breath. Gastrointestinal: Negative for abdominal pain, nausea and vomiting. Genitourinary: Negative for difficulty urinating and dysuria. Musculoskeletal: Positive for back pain. Negative for neck pain. Neurological: Negative for weakness and numbness. PAST MEDICAL HISTORY    has a past medical history of Anxiety; Arthritis; Asthma; Back pain, chronic; Colitis; Depression; Diarrhea; Diarrhea; Fibromyalgia; Hypertension; Migraine; Mixed hyperlipidemia; Numbness; Snores; and Type 2 diabetes mellitus without complication ([address] SURGICAL HISTORY      has a past surgical history that includes  section; Spinal fixation surgery with implant; orthopedic surgery (Bilateral); Upper gastrointestinal endoscopy (12-29-15); other surgical history (Left, 2016);  Nerve Block (Right, 11/14/2016); Nerve Block (Right, 12/02/2016); Nerve Block (Right, 01/20/2017); Nerve Block (Right, 04/05/2017); and Nerve Block (Left, 08/11/2017). CURRENT MEDICATIONS       Previous Medications    ACETAMINOPHEN (TYLENOL) 325 MG TABLET    Take 2 tablets by mouth every 6 hours as needed for Pain    ALBUTEROL SULFATE HFA (PROAIR HFA) 108 (90 BASE) MCG/ACT INHALER    Inhale 2 puffs into the lungs every 4 hours as needed for Wheezing    AMLODIPINE (NORVASC) 5 MG TABLET    Take 1 tablet by mouth daily    AMPHETAMINE-DEXTROAMPHETAMINE (ADDERALL XR) 15 MG EXTENDED RELEASE CAPSULE    take 1 capsule by mouth every morning    BUSPIRONE (BUSPAR) 30 MG TABLET    Take 1 tablet by mouth 2 times daily    DICYCLOMINE (BENTYL) 20 MG TABLET    Take 1 tablet by mouth 3 times daily as needed (cramping)    GABAPENTIN (NEURONTIN) 400 MG CAPSULE    Take 1 capsule by mouth 3 times daily . Will cause drowsiness. GLUCOSE BLOOD (BLOOD GLUCOSE TEST STRIPS) STRP    Test__1_times daily    Diagnosis: 250.0   Diabetes Mellitus____Insulin Dependent___x_Non-Insulin Dependent    LANCETS MISC    Use__1_times daily    Diagnisis:250.0  Diabetes Mellitus____Insulin Dependent_x__Non-Insulin Dependent    ONDANSETRON (ZOFRAN ODT) 4 MG DISINTEGRATING TABLET    Take 1 tablet by mouth every 8 hours as needed for Nausea or Vomiting    OXYCODONE (ROXICODONE) 5 MG IMMEDIATE RELEASE TABLET    Take 1 tablet by mouth every 6 hours as needed for Pain . OXYCODONE-ACETAMINOPHEN (PERCOCET) 5-325 MG PER TABLET    Take 2 tablets by mouth every 6 hours as needed for Pain .     QUETIAPINE (SEROQUEL XR) 300 MG XR TABLET    Take 1 tablet by mouth nightly    RA VITAMIN B-12 TR 1000 MCG TBCR    Take 1 tablet by mouth daily    SPIRIVA HANDIHALER 18 MCG INHALATION CAPSULE    inhale contents of 1 capsule by mouth once daily    TRINTELLIX 10 MG TABS TABLET    Take 1 tablet by mouth daily       ALLERGIES     is allergic to nsaids; demerol; dye [barium-containing compounds]; pcn [penicillins]; and tramadol. FAMILY HISTORY     indicated that her mother is alive. She indicated that her father is . family history includes Diabetes in her father; Heart Disease in her mother; High Blood Pressure in her father and mother; Stroke in her mother. SOCIAL HISTORY      reports that she has been smoking Cigarettes. She has a 2.30 pack-year smoking history. She has never used smokeless tobacco. She reports that she drinks alcohol. She reports that she does not use illicit drugs. PHYSICAL EXAM     INITIAL VITALS:  height is 5' 3\" (1.6 m) and weight is 218 lb (98.9 kg). Her oral temperature is 97.9 °F (36.6 °C). Her blood pressure is 139/98 (abnormal) and her pulse is 99. Her respiration is 20 and oxygen saturation is 98%. Physical Exam   Constitutional: She appears well-developed and well-nourished. Appears uncomfortable but nontoxic   HENT:   Head: Normocephalic and atraumatic. Neck: Normal range of motion. Neck supple. Cardiovascular: Normal rate, regular rhythm, normal heart sounds and intact distal pulses. Strong DP and PT bilaterally   Pulmonary/Chest: Effort normal and breath sounds normal. No respiratory distress. She has no wheezes. Abdominal: Soft. She exhibits no distension. There is no tenderness. There is no rebound and no guarding. Musculoskeletal:   nonfocal tenderness in the low back   Neurological:   Patient has 5/5 strength bilaterally for hip flexion, knee flexion and extension, dorsiflexion, plantar flexion, and great toe extension. DTR 2+ and equal bilaterally at knee and ankle. Intact sensation to light touch bilaterally all dermatomes in lower extremities. Skin: Skin is warm and dry. Psychiatric: She has a normal mood and affect. Her behavior is normal.       DIFFERENTIAL DIAGNOSIS/ MDM:     No neurologic deficits, no bowel or bladder changes, this is exacerbation of her chronic pain.   Again note said from PT this morning

## 2017-10-04 NOTE — PATIENT INSTRUCTIONS
Thank you letting us take care of you today. We hope that all your questions were addressed. If a question was overlooked or something else comes to mind after you return home, please call our office at [phone]. If you need to cancel or change an appointment, surgery or procedure, please contact the office at [phone].

## 2017-10-04 NOTE — PROGRESS NOTES
[facility] Surgery Clinic   History and Physical      PATIENT NAME: Zora Cartwright   YOB: 1975     TODAY'S DATE: 10/4/2017    CHIEF COMPLAINT:  Lipoma of the chest / Left chest wall pain    HISTORY OF PRESENT ILLNESS:  This is a 38 y.o. female w/ hx of chronic back pain/neuropathy secondary to diabetes, that presents to the clinic as a follow up after seeing us in the ED in consultation for a left sided chest/breast mass. The patient has been having pain for many months and states she did fall on to her left side getting out of the shower several months ago. She also reports occasional clear/white discharge from the left nipple. The patient was sent for a bilateral diagnostic mammogram which was negative for any concerning features but in combination with ultrasound was diagnostic for a left breast lipoma, dx by radiology - BIRAD 2. Given the appearance on imaging the patient was not sent for a core needle biopsy and is following up today for further recommendations. SHe continues to complain of unbearable pain to the left side of her chest. Denies fever, chills or any other concerning symptoms.      Past Medical History:        Diagnosis Date    Anxiety     Arthritis     Asthma     Back pain, chronic     Colitis     Depression     Diarrhea     Diarrhea     \"constant\" told she has colitis    Fibromyalgia     Hypertension     Migraine     Mixed hyperlipidemia 2016    Numbness     bilateral legs    Snores     Type 2 diabetes mellitus without complication ([facility] [address]        Past Surgical History:        Procedure Laterality Date     SECTION      NERVE BLOCK Right 2016    RT MBNB #1   celestone 6mg    NERVE BLOCK Right 2016    right MBNB #2, kenalog 40mg    NERVE BLOCK Right 2017    right radiofrequency, kenalog 40    NERVE BLOCK Right 2017    right lumbar RF kenalog 40mg    NERVE BLOCK Left 2017    Lt MBNB #1  25% marcaine    ORTHOPEDIC 5' 2.99\" (1.6 m)  Wt 219 lb (99.3 kg)  LMP 09/02/2017 (Approximate)  BMI 38.8 kg/m2  INTAKE/OUTPUT:   No intake or output data in the 24 hours ending 10/04/17 1225      CONSTITUTIONAL:  Alert and oriented, tearful  HEAD: normocephalic, atraumatic  EYES: sclera non icteric  ENT: Mucus membranes moist  CHEST: left chest wall tenderness along 4-5th rib/intercostal space. Exquisite tenderness to palpation over this area  BREAST: palpable lipoma around 4cm in size noted along the left lateral aspect of breast, no discharge noted on exam, no concerning features to the nipple aerolar complex, right breast without any concerning masses palpable, dense breast tissue palpable bilaterally   NECK: trachea midline   LUNGS:  Good air movement bilaterally  CARDIOVASCULAR: Regular rate and rhythm  ABDOMEN: soft, non tender, non distended, no rebound or guarding  MUSCULOSKELETAL:  Equal strength bilaterally, normal muscle tone  SKIN: No abscess or rash  NEUROLOGIC:  no focal deficits  PSYCH: affect appropriate      ASSESSMENT   Patient Active Problem List   Diagnosis    Lumbar back pain    Depression    Fibromyalgia    Anxiety    Type 2 diabetes mellitus without complication (HCC)    Iron deficiency anemia    Mixed hyperlipidemia    Rib pain on right side    Facet degeneration of lumbar region     1. Neuropathic pain likely secondary to 4-5th rib dysfunction on the left. Reviewed recent CXR without any obvious fractures. May also be costochondritis. 2. Left breast/chest lipoma     PLAN    1. Will hold off on excision of lipoma at this time as this does not appear to be the cause of the patient's pain. She is exquisitely tender along the 4th-5th rib on the left side of her chest and pain radiates across her left chest medially. Will plan to have patient continue physical therapy to add rib therapy/ chest wall therapy. 2. Continue home dose of mobic as needed for relief of inflammatory pain.   3. Patient can continue gabapentin home dose from surgical perspective. 4. Will re-evaluate patient in 3 weeks after continued therapy. Patient seen with Dr. Bryan Carranza. Electronically signed by Rand Norris DO  on 10/4/2017 at 12:25 PM   Attending Physician Statement  I have discussed the case with Dr Dariel Camp, including pertinent history and exam findings with the resident. I have seen and examined the patient and the key elements of the encounter have been performed by me. I agree with the assessment, plan and orders as documented by the resident.       Electronically signed by Fredy Ballard DO  on 10/9/2017 at 4:28 PM

## 2017-10-05 ENCOUNTER — HOSPITAL ENCOUNTER (OUTPATIENT)
Dept: PHYSICAL THERAPY | Facility: CLINIC | Age: 42
Setting detail: THERAPIES SERIES
Discharge: HOME | End: 2017-10-05
Payer: MEDICARE

## 2017-10-10 ENCOUNTER — HOSPITAL ENCOUNTER (OUTPATIENT)
Dept: PHYSICAL THERAPY | Facility: CLINIC | Age: 42
Setting detail: THERAPIES SERIES
Discharge: HOME | End: 2017-10-10
Payer: MEDICARE

## 2017-10-10 NOTE — FLOWSHEET NOTE
[] Brody Lopez        Outpatient Physical                Therapy       [address]       Phone: [phone]       Fax: [phone] [x] [facility] at [address]       Phone: [phone]       Fax: [phone] [] [place]. [address] [place] [address]      Phone: [phone]      Fax:  [phone]     Physical Therapy Cancel/No Show note    Date: 10/10/2017  Patient: Peggy Postal  : 1975  MRN: [id number]    Cancels/No Shows to date: 0/3    For today's appointment patient:  []  Cancelled  []  Rescheduled appointment  []  No-show     Reason given by patient:  []  Patient ill  []  Conflicting appointment  []  No transportation    []  Conflict with work  []  No reason given  []  Weather related  [x]  Other:     Comments:   Pt did not show up for her 2nd consecutive appointment after confirming this appointment. Pt will be taken off schedule at this time due to attendance and will be rescheduled when pt calls to make appt.     Electronically signed by: Carl Benton PT

## 2017-10-12 ENCOUNTER — HOSPITAL ENCOUNTER (OUTPATIENT)
Dept: PHYSICAL THERAPY | Facility: CLINIC | Age: 42
Setting detail: THERAPIES SERIES
End: 2017-10-12
Payer: MEDICARE

## 2017-10-17 ENCOUNTER — APPOINTMENT (OUTPATIENT)
Dept: PHYSICAL THERAPY | Facility: CLINIC | Age: 42
End: 2017-10-17
Payer: MEDICARE

## 2017-10-17 ENCOUNTER — HOSPITAL ENCOUNTER (OUTPATIENT)
Dept: PHYSICAL THERAPY | Facility: CLINIC | Age: 42
Setting detail: THERAPIES SERIES
Discharge: HOME | End: 2017-10-17
Payer: MEDICARE

## 2017-10-17 PROCEDURE — G8979 MOBILITY GOAL STATUS: HCPCS

## 2017-10-17 PROCEDURE — G8978 MOBILITY CURRENT STATUS: HCPCS

## 2017-10-17 PROCEDURE — 97164 PT RE-EVAL EST PLAN CARE: CPT

## 2017-10-17 NOTE — CONSULTS
[] Job Norma        Outpatient Physical                Therapy       [address]       Phone: [phone]       Fax: [phone] [x] [facility]       Promotion at [address]       Phone: [phone]       Fax: [phone] [] [facility]      for Health Promotion    [address]      Phone: [phone]     Fax:  [phone]     Physical Therapy Spine Re-Evaluation    Date:  10/17/2017  Patient: Rm Gamboa  : 1975  MRN: [id number]  Physician: David Villanueva DO   Insurance: Medicare/Medicaid  Medical Diagnosis: neuropathy/L rib pain  Rehab Codes: M54.6. R29.3, M62.81  Onset Date: 2017  Next 's appt.: Not scheduled    Subjective:   CC: L rib pain  HPI: (onset date): Pt is a 38year old female who is currently being seen for lumbar pain and was at her oncologist appt when they found tenderness over the L hips. Pt reports they thought she had fx'd ribs, however they looked at old imaging and stated this was not the case. Pt reports they wrote her an order to have the thoracic spine evaluated. Pt reports she has never really noted much soreness in the ribs except when they are touched. Pt reports she did fall out of her shower about 1-2 months ago secondary to being dizzy (BP issues- since been addressed). Pt reports she is no longer taking mobic for pain. Pt states she does have a history of a TIA and the LUE/LLE were weak following this incident. LBP: hurts really bad, throbbing and burning- pt reports she was sore this morning and then riding the bus makes it worse; pt reports she relaxed this weekend because her back was so sore. Pt reports she is out of her muscle relaxer and she can't really function without her muscle relaxer. Pt reports she hasn't been walking long distances and the L side of her back hurts more today.       PMHx: [] Unremarkable [] Diabetes [] HTN  [] Pacemaker   [] MI/Heart Problems [] Cancer [] Arthritis [] Other:              [x] Refer to full medical chart  In EPIC   Tests: [] X-Ray: [] MRI:  [] Other:    Medications: [x] Refer to full medical record [] None [] Other:  Allergies:      [x] Refer to full medical record [] None [] Other:      Sleep: [] OK    [x] Disturbed- is not having restful sleep        Objective:  OBSERVATION No Deficit Deficit Not Tested Comments   Posture    Significant forward head posture and rounded shoulders with sitting and standing posture   Forward Head [] [x] []    Rounded Shoulders [] [x] []    Kyphosis [] [x] []    Lordosis [] [x] []    Slumped Sitting [] [x] []    Palpation [] [x] [] TTP over the L ribs ~rib 4-6   Sensation [x] [] [] In arms and thorax   Edema [] [] [x]    Neurological [] [] [x]    Gait   x Analysis:   Balance   x L:  R:        STRENGTH  ROM   Shoulder Left Right Thoracolumbar Tape measure   Flexion 4+ 5 Flexion 13.5 cm    Abduction  5 5 Extension 4.5 cm   IR 5 4+ Rotation L 9 cm (pain) R 7 cm   ER 5 4+ Sidebend L To knee- deviates with forward flexion` R To knee   T1 finger abd 5 5 Retraction      Joint mobility: decreased mobility with PA glides in thoracic spine and lumbar spine  - Mild decrease in L rib mobility compared to the R    FUNCTION Normal Difficult Unable   Sitting [] [x] []   Standing [] [x] []   Ambulation [] [x] []   Groom/Dress [] [x] []   Lift/Carry [] [x] []   Stairs [] [x] []   Bending [] [x] []   OH reach [] [x] []       Assessment: Pt demonstrates to PT signs and symptoms consistent with rib pain on the L side which is tender to palpation. Pt demonstrates an increase in L rib pain with L rotation and demonstrates a significant forward head posture and rounded shoulders resulting in significant postural abnormalities and strength deficits. Pt's diagnosis is complicated by a benign lipoma in her L breast and could potentially be of a non-skeletal nature.  Pt's treatment will continue to focus on treatment

## 2017-10-19 ENCOUNTER — HOSPITAL ENCOUNTER (OUTPATIENT)
Dept: PHYSICAL THERAPY | Facility: CLINIC | Age: 42
Setting detail: THERAPIES SERIES
Discharge: HOME | End: 2017-10-19
Payer: MEDICARE

## 2017-10-24 RX ORDER — CYCLOBENZAPRINE HCL 5 MG
5 TABLET ORAL 3 TIMES DAILY PRN
Qty: 40 TABLET | Refills: 1 | Status: SHIPPED | OUTPATIENT
Start: 2017-10-24 | End: 2017-12-15 | Stop reason: SDUPTHER

## 2017-11-01 ENCOUNTER — OFFICE VISIT (OUTPATIENT)
Dept: INTERNAL MEDICINE | Age: 42
End: 2017-11-01
Payer: MEDICARE

## 2017-11-01 VITALS
WEIGHT: 219 LBS | HEIGHT: 63 IN | SYSTOLIC BLOOD PRESSURE: 140 MMHG | TEMPERATURE: 98 F | HEART RATE: 98 BPM | DIASTOLIC BLOOD PRESSURE: 100 MMHG | BODY MASS INDEX: 38.8 KG/M2

## 2017-11-01 DIAGNOSIS — M79.7 FIBROMYALGIA: ICD-10-CM

## 2017-11-01 DIAGNOSIS — Z12.4 SCREENING FOR CERVICAL CANCER: ICD-10-CM

## 2017-11-01 DIAGNOSIS — M54.50 CHRONIC BILATERAL LOW BACK PAIN WITHOUT SCIATICA: ICD-10-CM

## 2017-11-01 DIAGNOSIS — J20.8 ACUTE BRONCHITIS DUE TO OTHER SPECIFIED ORGANISMS: Primary | ICD-10-CM

## 2017-11-01 DIAGNOSIS — G89.29 CHRONIC BILATERAL LOW BACK PAIN WITHOUT SCIATICA: ICD-10-CM

## 2017-11-01 PROCEDURE — G8417 CALC BMI ABV UP PARAM F/U: HCPCS | Performed by: INTERNAL MEDICINE

## 2017-11-01 PROCEDURE — 4004F PT TOBACCO SCREEN RCVD TLK: CPT | Performed by: INTERNAL MEDICINE

## 2017-11-01 PROCEDURE — G8484 FLU IMMUNIZE NO ADMIN: HCPCS | Performed by: INTERNAL MEDICINE

## 2017-11-01 PROCEDURE — 99214 OFFICE O/P EST MOD 30 MIN: CPT

## 2017-11-01 PROCEDURE — 99213 OFFICE O/P EST LOW 20 MIN: CPT | Performed by: INTERNAL MEDICINE

## 2017-11-01 PROCEDURE — G8427 DOCREV CUR MEDS BY ELIG CLIN: HCPCS | Performed by: INTERNAL MEDICINE

## 2017-11-01 RX ORDER — AZITHROMYCIN 250 MG/1
TABLET, FILM COATED ORAL
Qty: 1 PACKET | Refills: 0 | Status: SHIPPED | OUTPATIENT
Start: 2017-11-01 | End: 2017-11-11

## 2017-11-01 RX ORDER — AZITHROMYCIN 250 MG/1
TABLET, FILM COATED ORAL
Qty: 1 PACKET | Refills: 0 | Status: SHIPPED | OUTPATIENT
Start: 2017-11-01 | End: 2017-11-01 | Stop reason: SDUPTHER

## 2017-11-01 RX ORDER — AMLODIPINE BESYLATE 10 MG/1
10 TABLET ORAL DAILY
Qty: 30 TABLET | Refills: 5 | Status: SHIPPED | OUTPATIENT
Start: 2017-11-01 | End: 2018-06-04 | Stop reason: SDUPTHER

## 2017-11-01 RX ORDER — GUAIFENESIN AND CODEINE PHOSPHATE 100; 10 MG/5ML; MG/5ML
5 SOLUTION ORAL 2 TIMES DAILY PRN
Qty: 120 ML | Refills: 0 | Status: SHIPPED | OUTPATIENT
Start: 2017-11-01 | End: 2017-11-08

## 2017-11-01 RX ORDER — AMLODIPINE BESYLATE 10 MG/1
10 TABLET ORAL DAILY
Qty: 30 TABLET | Refills: 5 | Status: SHIPPED | OUTPATIENT
Start: 2017-11-01 | End: 2017-11-01 | Stop reason: SDUPTHER

## 2017-11-01 RX ORDER — GUAIFENESIN AND CODEINE PHOSPHATE 100; 10 MG/5ML; MG/5ML
5 SOLUTION ORAL 2 TIMES DAILY PRN
Qty: 120 ML | Refills: 0 | Status: SHIPPED | OUTPATIENT
Start: 2017-11-01 | End: 2017-11-01 | Stop reason: SDUPTHER

## 2017-11-01 ASSESSMENT — ENCOUNTER SYMPTOMS
BACK PAIN: 1
GASTROINTESTINAL NEGATIVE: 1
SORE THROAT: 1
CHOKING: 1
ALLERGIC/IMMUNOLOGIC NEGATIVE: 1
EYES NEGATIVE: 1

## 2017-11-01 NOTE — PATIENT INSTRUCTIONS
Medications e-scribe to pharmacy of pt's choice. Printed and signed script for Tussi-Organidin given to pt. Reprinted referral to GI and given to pt. Referral for OB/GYN sent to [facility]  they will call pt for appt, copy of referral with number and address given to pt. Pt should call referral number if not heard from within a couple of weeks. Patient to return to clinic 3 months (2/1/18). AVS reviewed and given to pt. It is very important for your care that you keep your appointment. If for some reason you are unable to keep your appointment it is equally important that you call our office at [phone] to cancel your appointment and reschedule. Failure to do so may result in your termination from our practice.   MB

## 2017-11-01 NOTE — PROGRESS NOTES
Subjective:      Patient ID: Oleta Boast is a 38 y.o. female. Week's history of cough, nasal congestion, sore throat. Coughing green slimy sputum. \"Gets hot and cold. \"      Diabetes   Associated symptoms include fatigue. URI    Associated symptoms include congestion and a sore throat. Review of Systems   Constitutional: Positive for chills and fatigue. HENT: Positive for congestion and sore throat. Eyes: Negative. Respiratory: Positive for choking. Cardiovascular: Negative. Gastrointestinal: Negative. Endocrine: Negative. Genitourinary: Negative. Musculoskeletal: Positive for arthralgias and back pain. Allergic/Immunologic: Negative. Neurological: Negative. Hematological: Negative. Objective:   Physical Exam   Constitutional: She is oriented to person, place, and time. She appears well-developed and well-nourished. HENT:   Head: Normocephalic and atraumatic. Oral mucus membranes appear hydrated. Eyes: Conjunctivae are normal. Pupils are equal, round, and reactive to light. Neck: Normal range of motion. Neck supple. Cardiovascular: Normal rate and regular rhythm. Pulmonary/Chest: Effort normal and breath sounds normal.   Abdominal: Soft. Bowel sounds are normal.   Musculoskeletal: Normal range of motion. Neurological: She is alert and oriented to person, place, and time. She has normal reflexes. Skin: Skin is warm and dry. Assessment:       Ac bronchitis  Ac pharyngitis  HTN  Patient Active Problem List   Diagnosis    Lumbar back pain    Depression    Fibromyalgia    Anxiety    Type 2 diabetes mellitus without complication (HCC)    Iron deficiency anemia    Mixed hyperlipidemia    Rib pain on right side    Facet degeneration of lumbar region           Plan:      Increase Norvasc as per med list  z-alexsandra  Robitussin DM  Patient Active Problem List   Diagnosis    Lumbar back pain    Depression    Fibromyalgia    Anxiety    Type 2 diabetes mellitus without complication (HCC)    Iron deficiency anemia    Mixed hyperlipidemia    Rib pain on right side    Facet degeneration of lumbar region

## 2017-11-06 ENCOUNTER — TELEPHONE (OUTPATIENT)
Dept: INTERNAL MEDICINE | Age: 42
End: 2017-11-06

## 2017-11-06 RX ORDER — GUAIFENESIN 100 MG/5ML
10 SYRUP ORAL EVERY 4 HOURS PRN
Qty: 240 ML | Refills: 1 | Status: SHIPPED | OUTPATIENT
Start: 2017-11-06 | End: 2018-01-09 | Stop reason: ALTCHOICE

## 2017-11-06 NOTE — TELEPHONE ENCOUNTER
PA request received for Marquis Candle. This is non-formulary. Please e-scribe another medication is possible.  THank you

## 2017-11-09 ENCOUNTER — HOSPITAL ENCOUNTER (OUTPATIENT)
Dept: PAIN MANAGEMENT | Age: 42
Discharge: HOME | End: 2017-11-09
Payer: MEDICARE

## 2017-11-09 VITALS
DIASTOLIC BLOOD PRESSURE: 99 MMHG | WEIGHT: 219 LBS | SYSTOLIC BLOOD PRESSURE: 144 MMHG | HEIGHT: 63 IN | HEART RATE: 78 BPM | RESPIRATION RATE: 18 BRPM | BODY MASS INDEX: 38.8 KG/M2 | TEMPERATURE: 97.8 F

## 2017-11-09 PROCEDURE — 99214 OFFICE O/P EST MOD 30 MIN: CPT

## 2017-11-09 PROCEDURE — 99213 OFFICE O/P EST LOW 20 MIN: CPT | Performed by: PAIN MEDICINE

## 2017-11-09 ASSESSMENT — ENCOUNTER SYMPTOMS
UNUSUAL HAIR DISTRIBUTION: 0
BLURRED VISION: 0
SUSPICIOUS LESIONS: 0
BACK PAIN: 1
WHEEZING: 0
EYE DISCHARGE: 0
HEMOPTYSIS: 0

## 2017-12-13 ENCOUNTER — OFFICE VISIT (OUTPATIENT)
Dept: SURGERY | Age: 42
End: 2017-12-13
Payer: MEDICARE

## 2017-12-13 VITALS
DIASTOLIC BLOOD PRESSURE: 84 MMHG | HEART RATE: 69 BPM | SYSTOLIC BLOOD PRESSURE: 115 MMHG | TEMPERATURE: 98 F | BODY MASS INDEX: 35.47 KG/M2 | WEIGHT: 200.2 LBS | HEIGHT: 63 IN

## 2017-12-13 DIAGNOSIS — D17.1 LIPOMA OF CHEST WALL: ICD-10-CM

## 2017-12-13 PROCEDURE — G8484 FLU IMMUNIZE NO ADMIN: HCPCS | Performed by: STUDENT IN AN ORGANIZED HEALTH CARE EDUCATION/TRAINING PROGRAM

## 2017-12-13 PROCEDURE — G8417 CALC BMI ABV UP PARAM F/U: HCPCS | Performed by: STUDENT IN AN ORGANIZED HEALTH CARE EDUCATION/TRAINING PROGRAM

## 2017-12-13 PROCEDURE — G8428 CUR MEDS NOT DOCUMENT: HCPCS | Performed by: STUDENT IN AN ORGANIZED HEALTH CARE EDUCATION/TRAINING PROGRAM

## 2017-12-13 PROCEDURE — 4004F PT TOBACCO SCREEN RCVD TLK: CPT | Performed by: STUDENT IN AN ORGANIZED HEALTH CARE EDUCATION/TRAINING PROGRAM

## 2017-12-13 PROCEDURE — 99213 OFFICE O/P EST LOW 20 MIN: CPT | Performed by: STUDENT IN AN ORGANIZED HEALTH CARE EDUCATION/TRAINING PROGRAM

## 2017-12-13 NOTE — PROGRESS NOTES
[facility] SURGERY CLINIC   PROGRESS NOTE    DATE: 2017     SUBJECTIVE:  Aj Spencer is a 38 y.o. female who returns to the [facility] surgery clinic for re-evaluation of her left-sided chest lipoma. She complains of continued pain in the area of the mass and describes it as an 8/10, denies any change in size. Pt has been using Tylenol for pain control but has had no relief. She reports previous creamy nipple discharge but none present today. Ultrasound and mammogram performed on  of the area show a 6 cm lipoma, BIRADS 2. Denies redness or warmth in the area, fevers/chills, N/V, difficulty breathing, or chest pain other than described with the mass. Pt does have a family history of breast cancer in her [de-identified]. Pt started her menstrual cycle at age 17. Pt reports that her pain does not change with her cycle.        Past Medical History:   Diagnosis Date    Anxiety     Arthritis     Asthma     Back pain, chronic     Colitis     Depression     Diarrhea     Diarrhea     \"constant\" told she has colitis    Fibromyalgia     Hypertension     Lump of breast, left     Migraine     Mixed hyperlipidemia 2016    Numbness     bilateral legs    Snores     Type 2 diabetes mellitus without complication ([facility] [address]        Past Surgical History:   Procedure Laterality Date     SECTION      NERVE BLOCK Right 2016    RT MBNB #1   celestone 6mg    NERVE BLOCK Right 2016    right MBNB #2, kenalog 40mg    NERVE BLOCK Right 2017    right radiofrequency, kenalog 40    NERVE BLOCK Right 2017    right lumbar RF kenalog 40mg    NERVE BLOCK Left 2017    Lt MBNB #1  25% marcaine    ORTHOPEDIC SURGERY Bilateral     5 surgeries on each arm including carpal tunnel release lis    OTHER SURGICAL HISTORY Left 2016    Left Groin Hydradenitis    SPINAL FIXATION SURGERY WITH IMPLANT      spinal cord stimulator subsequently removed    UPPER GASTROINTESTINAL pt and states itching very severe; held and wasted    Dye [Barium-Containing Compounds]     Pcn [Penicillins]     Tramadol        Family History   Problem Relation Age of Onset    Heart Disease Mother     Stroke Mother     High Blood Pressure Mother     High Blood Pressure Father     Diabetes Father        Social History     Social History    Marital status: Single     Spouse name: N/A    Number of children: 2    Years of education: N/A     Occupational History    disability      Social History Main Topics    Smoking status: Current Some Day Smoker     Packs/day: 0.10     Years: 23.00     Types: Cigarettes     Last attempt to quit: 10/1/2015    Smokeless tobacco: Never Used    Alcohol use Yes    Drug use: No      Comment: hx marijuana quit 2004    Sexual activity: Yes     Partners: Female     Other Topics Concern    Not on file     Social History Narrative    No narrative on file       ROS:  GEN: Denies recent weight loss, fatigue, fevers, chills. HEENT: No rhinorrhea, dysphagia, odynphagia. CV: No history of MI, recent chest pain. RESP: Denies shortness of breath, COPD, asthma. GI: Denies abdominal pain, constipation or diarrhea  : Denies increased frequency or dysuria. ENDO: History of diabetes; Denies history of thyroid problems  NEURO: Denies history of CVA, TIA. Notes reviewed, and agree with documentation in pt's chart. PHYSICAL EXAM:  Vitals:    12/13/17 1116   BP: 115/84   Pulse: 69   Temp: 98 °F (36.7 °C)     GEN: Alert and oriented x 3. In no acute distress. Appears stated age. BREAST: Patient has a palpable mass on the left lateral chest wall, tender to palpation; skin color normal, with no redness, warmth or swelling, no axillary tenderness or palpable nodes, no nipple discharge, breasts appear symmetric   HEART: Regular rate and rhythm    LUNGS: symmetrical chest rise bilaterally   NEURO: no focal deficits, gross motor intact.       IMAGING:  No mammographic evidence of

## 2017-12-15 ENCOUNTER — HOSPITAL ENCOUNTER (OUTPATIENT)
Dept: PAIN MANAGEMENT | Age: 42
Discharge: HOME | End: 2017-12-15
Payer: MEDICARE

## 2017-12-15 VITALS
SYSTOLIC BLOOD PRESSURE: 125 MMHG | RESPIRATION RATE: 20 BRPM | HEART RATE: 79 BPM | BODY MASS INDEX: 37.21 KG/M2 | TEMPERATURE: 97.1 F | WEIGHT: 210 LBS | DIASTOLIC BLOOD PRESSURE: 96 MMHG | OXYGEN SATURATION: 98 % | HEIGHT: 63 IN

## 2017-12-15 DIAGNOSIS — G89.29 CHRONIC BACK PAIN, UNSPECIFIED BACK LOCATION, UNSPECIFIED BACK PAIN LATERALITY: ICD-10-CM

## 2017-12-15 DIAGNOSIS — M54.9 CHRONIC BACK PAIN, UNSPECIFIED BACK LOCATION, UNSPECIFIED BACK PAIN LATERALITY: ICD-10-CM

## 2017-12-15 LAB — GLUCOSE BLD-MCNC: 149 MG/DL (ref 65–105)

## 2017-12-15 PROCEDURE — G0260 INJ FOR SACROILIAC JT ANESTH: HCPCS

## 2017-12-15 PROCEDURE — 82947 ASSAY GLUCOSE BLOOD QUANT: CPT

## 2017-12-15 PROCEDURE — 6360000002 HC RX W HCPCS: Performed by: PAIN MEDICINE

## 2017-12-15 PROCEDURE — 27096 INJECT SACROILIAC JOINT: CPT | Performed by: PAIN MEDICINE

## 2017-12-15 PROCEDURE — 2580000003 HC RX 258: Performed by: PAIN MEDICINE

## 2017-12-15 RX ORDER — MIDAZOLAM HYDROCHLORIDE 1 MG/ML
2 INJECTION INTRAMUSCULAR; INTRAVENOUS ONCE
Status: COMPLETED | OUTPATIENT
Start: 2017-12-15 | End: 2017-12-15

## 2017-12-15 RX ORDER — SODIUM CHLORIDE, SODIUM LACTATE, POTASSIUM CHLORIDE, CALCIUM CHLORIDE 600; 310; 30; 20 MG/100ML; MG/100ML; MG/100ML; MG/100ML
INJECTION, SOLUTION INTRAVENOUS CONTINUOUS
Status: DISCONTINUED | OUTPATIENT
Start: 2017-12-15 | End: 2017-12-16 | Stop reason: HOSPADM

## 2017-12-15 RX ADMIN — SODIUM CHLORIDE, POTASSIUM CHLORIDE, SODIUM LACTATE AND CALCIUM CHLORIDE: 600; 310; 30; 20 INJECTION, SOLUTION INTRAVENOUS at 12:31

## 2017-12-15 RX ADMIN — MIDAZOLAM HYDROCHLORIDE 1 MG: 1 INJECTION, SOLUTION INTRAMUSCULAR; INTRAVENOUS at 12:33

## 2017-12-15 ASSESSMENT — PAIN DESCRIPTION - PAIN TYPE: TYPE: CHRONIC PAIN

## 2017-12-15 ASSESSMENT — PAIN SCALES - GENERAL: PAINLEVEL_OUTOF10: 8

## 2017-12-15 ASSESSMENT — PAIN DESCRIPTION - DESCRIPTORS: DESCRIPTORS: CONSTANT;ACHING;SHARP;THROBBING

## 2017-12-15 ASSESSMENT — PAIN DESCRIPTION - ORIENTATION: ORIENTATION: RIGHT;LEFT;MID;LOWER

## 2017-12-15 ASSESSMENT — PAIN DESCRIPTION - PROGRESSION: CLINICAL_PROGRESSION: NOT CHANGED

## 2017-12-15 ASSESSMENT — PAIN DESCRIPTION - LOCATION: LOCATION: BACK;BUTTOCKS

## 2017-12-15 ASSESSMENT — PAIN - FUNCTIONAL ASSESSMENT: PAIN_FUNCTIONAL_ASSESSMENT: 0-10

## 2017-12-15 NOTE — OP NOTE
Sacro-Iliac Joint Injection:  SURGEON: Lesvia Brown    PRE-OP DIAGNOSIS: Sacroiliitis (M46.1)    POST-OP DIAGNOSIS: Same. Procedure performed: Bilateral Sacroiliac Joint Injection. Physician confirmed and marked the surgical site. ASA: 3                  MP: 2    CONSENT: Patient has undergone the educational process with this procedure, is aware and fully understands the risks involved: potential damage to any and all body organs including possible bleeding, infection, and nerve injury, allergic reaction and headache. Patient also understands that the procedure will be undertaken in a safe, controlled and monitored setting. Patient recognizes that the benefits may include relief from pain and reduction in the oral use of medications. Patient agreed to proceed. PREP: The patient's back was prepped with chloroprep and draped appropriately. 5ml of 0.5% lidocaine was used to anesthetize the skin and subcutaneous tissue. PROCEDURE NOTE: The 22 gauge 3.5 inch spinal needle was advanced to the Bilateral SI joint under constant fluoroscopic guidance. Aspiration was negative for blood, CSF and producing pain. 2ml of  0.25% bupivacaine plain was mixed with 40mg Depomedrol and slowly injected into the joint(s). The needle was withdrawn by the physician and the nurse applied a sterile dressing. The patient tolerated the procedure well. No complications occurred. Patient transferred to the recovery room in satisfactory condition. Appropriate written discharge instructions given to the patient.       [address] [place] [address]

## 2017-12-15 NOTE — TELEPHONE ENCOUNTER
Phone call from patient requesting refill on flexeril. Patient was seeing Dr Raffaele Jones now will be seeing you, please review and advise. Health Maintenance   Topic Date Due    HIV screen  01/14/1992    Diabetic microalbuminuria test  05/06/2017    Lipid screen  06/09/2017    Flu vaccine (1) 09/01/2017    Diabetic foot exam  12/07/2017    DTaP/Tdap/Td vaccine (1 - Tdap) 03/23/2018 (Originally 1/14/1996)    Diabetic retinal exam  06/13/2018 (Originally 1/14/1987)    Cervical cancer screen  05/06/2019 (Originally 1/14/1998)    Diabetic hemoglobin A1C test  05/18/2018    Pneumococcal med risk  Completed       Hemoglobin A1C (%)   Date Value   05/18/2017 6.1 (H)   06/09/2016 6.4 (H)   12/28/2015 6.8 (H)             ( goal A1C is < 7)   Microalb/Crt.  Ratio (mcg/mg creat)   Date Value   05/06/2016 23     LDL Cholesterol (mg/dL)   Date Value   06/09/2016 54       (goal LDL is <100)   AST (U/L)   Date Value   07/25/2017 22     ALT (U/L)   Date Value   07/25/2017 15     BUN (mg/dL)   Date Value   08/12/2017 4 (L)     BP Readings from Last 3 Encounters:   12/15/17 (!) 125/96   12/13/17 115/84   11/09/17 (!) 144/99          (goal 120/80)      Next Visit Date:  2/1/2018    Patient Active Problem List:     Lumbar back pain     Depression     Fibromyalgia     Anxiety     Type 2 diabetes mellitus without complication (HCC)     Iron deficiency anemia     Mixed hyperlipidemia     Rib pain on right side     Facet degeneration of lumbar region     Lipoma of chest wall

## 2017-12-15 NOTE — H&P
HANDIHALER 18 MCG inhalation capsule, inhale contents of 1 capsule by mouth once daily, Disp: 30 capsule, Rfl: 6    TRINTELLIX 10 MG TABS tablet, Take 1 tablet by mouth daily, Disp: , Rfl: 0    gabapentin (NEURONTIN) 400 MG capsule, Take 1 capsule by mouth 3 times daily . Will cause drowsiness. , Disp: 90 capsule, Rfl: 6    amphetamine-dextroamphetamine (ADDERALL XR) 15 MG extended release capsule, take 1 capsule by mouth every morning, Disp: , Rfl: 0    ondansetron (ZOFRAN ODT) 4 MG disintegrating tablet, Take 1 tablet by mouth every 8 hours as needed for Nausea or Vomiting, Disp: 12 tablet, Rfl: 0    dicyclomine (BENTYL) 20 MG tablet, Take 1 tablet by mouth 3 times daily as needed (cramping), Disp: 120 tablet, Rfl: 3    albuterol sulfate HFA (PROAIR HFA) 108 (90 BASE) MCG/ACT inhaler, Inhale 2 puffs into the lungs every 4 hours as needed for Wheezing, Disp: 1 Inhaler, Rfl: 0    busPIRone (BUSPAR) 30 MG tablet, Take 1 tablet by mouth 2 times daily, Disp: , Rfl: 0    RA VITAMIN B-12 TR 1000 MCG TBCR, Take 1 tablet by mouth daily, Disp: 30 tablet, Rfl: 5    QUEtiapine (SEROQUEL XR) 300 MG XR tablet, Take 1 tablet by mouth nightly, Disp: 30 tablet, Rfl: 1    Lancets MISC, Use__1_times daily  Diagnisis:250.0  Diabetes Mellitus____Insulin Dependent_x__Non-Insulin Dependent, Disp: 100 each, Rfl: 11    Glucose Blood (BLOOD GLUCOSE TEST STRIPS) STRP, Test__1_times daily  Diagnosis: 250.0   Diabetes Mellitus____Insulin Dependent___x_Non-Insulin Dependent, Disp: 100 strip, Rfl: 11    Social History   Substance Use Topics    Smoking status: Current Some Day Smoker     Packs/day: 0.10     Years: 23.00     Types: Cigarettes     Last attempt to quit: 10/1/2015    Smokeless tobacco: Never Used    Alcohol use Yes       Review of Systems:   Focused review of systems was performed, and negative as pertinent to diagnosis, except as stated in HPI.     Physical Exam:     /78   Pulse 90   Temp 97.1 °F (36.2 °C)

## 2017-12-20 RX ORDER — CYCLOBENZAPRINE HCL 5 MG
5 TABLET ORAL 3 TIMES DAILY PRN
Qty: 40 TABLET | Refills: 1 | Status: SHIPPED | OUTPATIENT
Start: 2017-12-20 | End: 2017-12-30

## 2017-12-21 ENCOUNTER — APPOINTMENT (OUTPATIENT)
Dept: CT IMAGING | Age: 42
End: 2017-12-21
Payer: MEDICARE

## 2017-12-21 ENCOUNTER — HOSPITAL ENCOUNTER (EMERGENCY)
Age: 42
Discharge: HOME | End: 2017-12-21
Attending: EMERGENCY MEDICINE
Payer: MEDICARE

## 2017-12-21 ENCOUNTER — APPOINTMENT (OUTPATIENT)
Dept: GENERAL RADIOLOGY | Age: 42
End: 2017-12-21
Payer: MEDICARE

## 2017-12-21 ENCOUNTER — TELEPHONE (OUTPATIENT)
Dept: SURGERY | Age: 42
End: 2017-12-21

## 2017-12-21 VITALS
OXYGEN SATURATION: 100 % | SYSTOLIC BLOOD PRESSURE: 137 MMHG | WEIGHT: 230 LBS | RESPIRATION RATE: 16 BRPM | TEMPERATURE: 97.7 F | DIASTOLIC BLOOD PRESSURE: 89 MMHG | HEIGHT: 64 IN | HEART RATE: 88 BPM | BODY MASS INDEX: 39.27 KG/M2

## 2017-12-21 DIAGNOSIS — M54.9 CHRONIC BACK PAIN, UNSPECIFIED BACK LOCATION, UNSPECIFIED BACK PAIN LATERALITY: ICD-10-CM

## 2017-12-21 DIAGNOSIS — E11.9 TYPE 2 DIABETES MELLITUS WITHOUT COMPLICATION, WITHOUT LONG-TERM CURRENT USE OF INSULIN (HCC): ICD-10-CM

## 2017-12-21 DIAGNOSIS — I10 ESSENTIAL HYPERTENSION: Primary | ICD-10-CM

## 2017-12-21 DIAGNOSIS — H81.10 BPPV (BENIGN PAROXYSMAL POSITIONAL VERTIGO), UNSPECIFIED LATERALITY: Primary | ICD-10-CM

## 2017-12-21 DIAGNOSIS — R42 DIZZINESS: ICD-10-CM

## 2017-12-21 DIAGNOSIS — G89.29 CHRONIC BACK PAIN, UNSPECIFIED BACK LOCATION, UNSPECIFIED BACK PAIN LATERALITY: ICD-10-CM

## 2017-12-21 LAB
AMPHETAMINE SCREEN URINE: NEGATIVE
ANION GAP SERPL CALCULATED.3IONS-SCNC: 16 MMOL/L (ref 9–17)
BARBITURATE SCREEN URINE: NEGATIVE
BENZODIAZEPINE SCREEN, URINE: NEGATIVE
BUN BLDV-MCNC: 10 MG/DL (ref 6–20)
BUN/CREAT BLD: ABNORMAL (ref 9–20)
BUPRENORPHINE URINE: NORMAL
CALCIUM SERPL-MCNC: 8.7 MG/DL (ref 8.6–10.4)
CANNABINOID SCREEN URINE: NEGATIVE
CHLORIDE BLD-SCNC: 103 MMOL/L (ref 98–107)
CHP ED QC CHECK: YES
CO2: 22 MMOL/L (ref 20–31)
COCAINE METABOLITE, URINE: NEGATIVE
CREAT SERPL-MCNC: 0.79 MG/DL (ref 0.5–0.9)
EKG ATRIAL RATE: 86 BPM
EKG P AXIS: 28 DEGREES
EKG P-R INTERVAL: 144 MS
EKG Q-T INTERVAL: 382 MS
EKG QRS DURATION: 78 MS
EKG QTC CALCULATION (BAZETT): 457 MS
EKG R AXIS: 6 DEGREES
EKG T AXIS: 29 DEGREES
EKG VENTRICULAR RATE: 86 BPM
GFR AFRICAN AMERICAN: >60 ML/MIN
GFR NON-AFRICAN AMERICAN: >60 ML/MIN
GFR SERPL CREATININE-BSD FRML MDRD: ABNORMAL ML/MIN/{1.73_M2}
GFR SERPL CREATININE-BSD FRML MDRD: ABNORMAL ML/MIN/{1.73_M2}
GLUCOSE BLD-MCNC: 154 MG/DL (ref 70–99)
HCT VFR BLD CALC: 38.9 % (ref 36.3–47.1)
HEMOGLOBIN: 12.2 G/DL (ref 11.9–15.1)
MCH RBC QN AUTO: 25.6 PG (ref 25.2–33.5)
MCHC RBC AUTO-ENTMCNC: 31.4 G/DL (ref 28.4–34.8)
MCV RBC AUTO: 81.6 FL (ref 82.6–102.9)
MDMA URINE: NORMAL
METHADONE SCREEN, URINE: NEGATIVE
METHAMPHETAMINE, URINE: NORMAL
OPIATES, URINE: NEGATIVE
OXYCODONE SCREEN URINE: NEGATIVE
PDW BLD-RTO: 14.3 % (ref 11.8–14.4)
PHENCYCLIDINE, URINE: NEGATIVE
PLATELET # BLD: 310 K/UL (ref 138–453)
PMV BLD AUTO: 10.3 FL (ref 8.1–13.5)
POC TROPONIN I: 0 NG/ML (ref 0–0.1)
POC TROPONIN I: 0.01 NG/ML (ref 0–0.1)
POC TROPONIN INTERP: NORMAL
POC TROPONIN INTERP: NORMAL
POTASSIUM SERPL-SCNC: 4 MMOL/L (ref 3.7–5.3)
PREGNANCY TEST URINE, POC: NEGATIVE
PROPOXYPHENE, URINE: NORMAL
RBC # BLD: 4.77 M/UL (ref 3.95–5.11)
SODIUM BLD-SCNC: 141 MMOL/L (ref 135–144)
TEST INFORMATION: NORMAL
TRICYCLIC ANTIDEPRESSANTS, UR: NORMAL
WBC # BLD: 7.4 K/UL (ref 3.5–11.3)

## 2017-12-21 PROCEDURE — 70450 CT HEAD/BRAIN W/O DYE: CPT

## 2017-12-21 PROCEDURE — 85027 COMPLETE CBC AUTOMATED: CPT

## 2017-12-21 PROCEDURE — 96374 THER/PROPH/DIAG INJ IV PUSH: CPT

## 2017-12-21 PROCEDURE — 93005 ELECTROCARDIOGRAM TRACING: CPT

## 2017-12-21 PROCEDURE — 6360000002 HC RX W HCPCS: Performed by: STUDENT IN AN ORGANIZED HEALTH CARE EDUCATION/TRAINING PROGRAM

## 2017-12-21 PROCEDURE — 80307 DRUG TEST PRSMV CHEM ANLYZR: CPT

## 2017-12-21 PROCEDURE — 6370000000 HC RX 637 (ALT 250 FOR IP): Performed by: STUDENT IN AN ORGANIZED HEALTH CARE EDUCATION/TRAINING PROGRAM

## 2017-12-21 PROCEDURE — 99284 EMERGENCY DEPT VISIT MOD MDM: CPT

## 2017-12-21 PROCEDURE — 71010 XR CHEST LIMITED: CPT

## 2017-12-21 PROCEDURE — 84484 ASSAY OF TROPONIN QUANT: CPT

## 2017-12-21 PROCEDURE — 80048 BASIC METABOLIC PNL TOTAL CA: CPT

## 2017-12-21 RX ORDER — DIAZEPAM 5 MG/1
5 TABLET ORAL ONCE
Status: COMPLETED | OUTPATIENT
Start: 2017-12-21 | End: 2017-12-21

## 2017-12-21 RX ORDER — ONDANSETRON 2 MG/ML
4 INJECTION INTRAMUSCULAR; INTRAVENOUS ONCE
Status: COMPLETED | OUTPATIENT
Start: 2017-12-21 | End: 2017-12-21

## 2017-12-21 RX ORDER — MECLIZINE HCL 12.5 MG/1
25 TABLET ORAL ONCE
Status: COMPLETED | OUTPATIENT
Start: 2017-12-21 | End: 2017-12-21

## 2017-12-21 RX ORDER — MECLIZINE HYDROCHLORIDE 25 MG/1
25 TABLET ORAL 3 TIMES DAILY PRN
Qty: 14 TABLET | Refills: 0 | Status: SHIPPED | OUTPATIENT
Start: 2017-12-21 | End: 2017-12-28

## 2017-12-21 RX ORDER — AMLODIPINE BESYLATE 5 MG/1
5 TABLET ORAL DAILY
Qty: 30 TABLET | Refills: 3 | Status: CANCELLED | OUTPATIENT
Start: 2017-12-21

## 2017-12-21 RX ADMIN — MECLIZINE HCL 25 MG: 12.5 TABLET ORAL at 17:59

## 2017-12-21 RX ADMIN — ONDANSETRON 4 MG: 2 INJECTION INTRAMUSCULAR; INTRAVENOUS at 15:16

## 2017-12-21 RX ADMIN — DIAZEPAM 5 MG: 5 TABLET ORAL at 17:59

## 2017-12-21 RX ADMIN — MECLIZINE HCL 25 MG: 12.5 TABLET ORAL at 15:16

## 2017-12-21 ASSESSMENT — ENCOUNTER SYMPTOMS
DIARRHEA: 1
WHEEZING: 0
CONSTIPATION: 0
COUGH: 0
ABDOMINAL PAIN: 1
SHORTNESS OF BREATH: 0
ANAL BLEEDING: 0
VOMITING: 0
NAUSEA: 0

## 2017-12-21 ASSESSMENT — PAIN DESCRIPTION - FREQUENCY: FREQUENCY: CONTINUOUS

## 2017-12-21 ASSESSMENT — PAIN DESCRIPTION - PAIN TYPE: TYPE: ACUTE PAIN

## 2017-12-21 ASSESSMENT — PAIN DESCRIPTION - LOCATION: LOCATION: HEAD

## 2017-12-21 ASSESSMENT — PAIN DESCRIPTION - DESCRIPTORS: DESCRIPTORS: ACHING;SORE

## 2017-12-21 ASSESSMENT — PAIN SCALES - GENERAL: PAINLEVEL_OUTOF10: 5

## 2017-12-21 NOTE — ED PROVIDER NOTES
[address]  ED  eMERGENCY dEPARTMENT eNCOUnter  Resident    Pt Name: Debbie West  MRN: [id number]  [place] 1975  Date of evaluation: 12/21/2017  PCP:  Keely Cartagena MD    CHIEF COMPLAINT       Chief Complaint   Patient presents with    Dizziness         HISTORY OF PRESENT ILLNESS       39 yo female w/ PMH significant for HTN, HLD, DM, fibromyalgia, arthritis, depression and axiety presents to the ED c/o dizziness and HA. She reports that for the past 2 weeks she has been experiencing episodes of dizziness and nausea that come and go and have been progressively worsening. She reports that it has gotten really bad over the past 2 days and is now debilitating. The dizziness is worse when she sits up or stands. Earlier today she became extremely dizzy and fell and hit her head and ribs while in the shower, no LOC. She reports that she has had diarrhea for the past 2 weeks as well, approximately 3 watery bowel movements per day. She also is complaining of left sided chest pain which radiates to her back, she describes it as a sharp throbbing pain that has been there for the past 2 days. She denies fevers, chills, vommiting, blurry vision, dysuria, sob. She is unable to identify any aggravating or alleviating factors. She denies any recent changes in her medications, reports she has been taking them all as prescribed. She denies any recreational drug or alcohol use. She reports that she has had episodes of vertigo in the past and intermittently feels pressure in her right ear. Location/Symptom: dizziness   Timing/Onset: 2 weeks, worse last 2 days  Context/Setting: hx of BPPV  Quality:  Duration: progressively worsening over 2 weeks  Modifying Factors: position   Severity: moderate     REVIEW OF SYSTEMS       Review of Systems   Constitutional: Negative for chills and fever. Respiratory: Negative for cough, shortness of breath and wheezing.     Cardiovascular: Positive for chest pain.   Gastrointestinal: Positive for abdominal pain and diarrhea. Negative for anal bleeding, constipation, nausea and vomiting. Genitourinary: Negative for difficulty urinating, dysuria, frequency and urgency. Neurological: Positive for dizziness, light-headedness and headaches. Negative for weakness. PAST MEDICAL HISTORY    has a past medical history of Anxiety; Arthritis; Asthma; Back pain, chronic; Colitis; Depression; Diarrhea; Diarrhea; Fibromyalgia; Hypertension; Lump of breast, left; Migraine; Mixed hyperlipidemia; Numbness; Snores; and Type 2 diabetes mellitus without complication ([facility] [address] SURGICAL HISTORY      has a past surgical history that includes  section; Spinal fixation surgery with implant; orthopedic surgery (Bilateral); Upper gastrointestinal endoscopy (12-29-15); other surgical history (Left, 2016); Nerve Block (Right, 2016); Nerve Block (Right, 2016); Nerve Block (Right, 2017); Nerve Block (Right, 2017); and Nerve Block (Left, 2017). CURRENT MEDICATIONS       Discharge Medication List as of 2017  7:10 PM      CONTINUE these medications which have NOT CHANGED    Details   cyclobenzaprine (FLEXERIL) 5 MG tablet Take 1 tablet by mouth 3 times daily as needed for Muscle spasms . Will cause drowsiness. , Disp-40 tablet, R-1Normal      guaiFENesin (ALTARUSSIN) 100 MG/5ML syrup Take 10 mLs by mouth every 4 hours as needed for Cough, Disp-240 mL, R-1Normal      amLODIPine (NORVASC) 10 MG tablet Take 1 tablet by mouth daily, Disp-30 tablet, R-5Normal      acetaminophen (TYLENOL) 325 MG tablet Take 2 tablets by mouth every 6 hours as needed for Pain, Disp-30 tablet, R-0Print      SPIRIVA HANDIHALER 18 MCG inhalation capsule inhale contents of 1 capsule by mouth once daily, Disp-30 capsule, R-6Normal      TRINTELLIX 10 MG TABS tablet Take 1 tablet by mouth daily, R-0, DAWHistorical Med      gabapentin (NEURONTIN) 400 MG capsule Take 1 (104.3 kg). Her oral temperature is 97.7 °F (36.5 °C). Her blood pressure is 137/89 and her pulse is 88. Her respiration is 16 and oxygen saturation is 100%. Physical Exam   Constitutional: She is oriented to person, place, and time. She appears well-developed and well-nourished. No distress. HENT:   Head: Normocephalic and atraumatic. Tenderness over occiput    Eyes: Pupils are equal, round, and reactive to light. Neck: No JVD present. No thyromegaly present. Cardiovascular: Normal rate, regular rhythm and normal heart sounds. Exam reveals no gallop and no friction rub. No murmur heard. Pulmonary/Chest: Effort normal and breath sounds normal. No respiratory distress. She has no wheezes. Abdominal: Soft. Bowel sounds are normal. She exhibits no distension. There is tenderness. Neurological: She is alert and oriented to person, place, and time. No cranial nerve deficit. Coordination normal.   CN 2-12 intact, no focal deficits   Seguin Hallpike test negative, no nystagmus appreciated   No hearing loss    Skin: Skin is warm and dry. No rash noted. She is not diaphoretic. No erythema. DIFFERENTIAL DIAGNOSIS/MDM:   Vertigo peripheral vs central, dizziness due to dehydration 2/2 diarrhea     DIAGNOSTIC RESULTS     EKG: All EKG's are interpreted by the Emergency Department Physician who either signs or Co-signs this chart in the absence of a cardiologist.        RADIOLOGY:   I directly visualized the following  images and reviewed the radiologist interpretations:  XR CHEST (SINGLE VIEW FRONTAL)   Final Result   No acute process. CT HEAD WO CONTRAST   Final Result   No acute intracranial abnormality.                  ED BEDSIDE ULTRASOUND:       LABS:  Labs Reviewed   CBC - Abnormal; Notable for the following:        Result Value    MCV 81.6 (*)     All other components within normal limits   BASIC METABOLIC PANEL - Abnormal; Notable for the following:     Glucose 154 (*)     All other components within normal limits   POCT URINE PREGNANCY - Normal   URINE DRUG SCREEN   POCT TROPONIN   POCT TROPONIN   POCT TROPONIN             EMERGENCY DEPARTMENT COURSE:   Vitals:    Vitals:    12/21/17 1623 12/21/17 1631 12/21/17 1703 12/21/17 1704   BP: 118/73 122/66 (!) 134/92 137/89   Pulse:       Resp:       Temp:       TempSrc:       SpO2: 99% 100% 100% 100%   Weight:       Height:             CRITICAL CARE:      CONSULTS:  None      PROCEDURES:  Procedures      FINAL IMPRESSION      1. BPPV (benign paroxysmal positional vertigo), unspecified laterality    2.  Dizziness        Patient improved with Epley maneuver, valium, Antivert     DISPOSITION/PLAN   DISPOSITION      Discharge home      PATIENT REFERRED TO:  Damien Cooks, [address] [place] [address]  [phone]    In 1 week      [facility]  [address] [address]  [phone]    If symptoms worsen      DISCHARGE MEDICATIONS:  Discharge Medication List as of 12/21/2017  7:10 PM      START taking these medications    Details   meclizine (ANTIVERT) 25 MG tablet Take 1 tablet by mouth 3 times daily as needed for Dizziness, Disp-14 tablet, R-0Print             (Please note that portions of this note were completed with a voice recognition program.  Efforts were made to edit the dictations but occasionally words are mis-transcribed.)    Lyndsey Hernandez MD  Family Medicine Resident             Lyndsey Hernandez MD  12/21/17 1121 Stephanie Fowler MD  12/21/17 8392

## 2017-12-22 NOTE — ED PROVIDER NOTES
[address] [facility]     Emergency Department     Faculty Note/ Attestation      Pt Name: Tank Herron                                       MRN: [id number]  Chikisgfpraneeth 1975  Date of evaluation: 12/21/2017    Patients PCP:    Mikayla Patel MD      Attestation  I performed a history and physical examination of the patient and discussed management with the resident. I reviewed the residents note and agree with the documented findings and plan of care. Any areas of disagreement are noted on the chart. I was personally present for the key portions of any procedures. I have documented in the chart those procedures where I was not present during the key portions. I have reviewed the emergency nurses triage note. I agree with the chief complaint, past medical history, past surgical history, allergies, medications, social and family history as documented unless otherwise noted below. For Physician Assistant/ Nurse Practitioner cases/documentation I have personally evaluated this patient and have completed at least one if not all key elements of the E/M (history, physical exam, and MDM). Additional findings are as noted. Initial Screens:        Jonn Coma Scale  Eye Opening: Spontaneous  Best Verbal Response: Oriented  Best Motor Response: Obeys commands  Jonn Coma Scale Score: 15    Vitals:    Vitals:    12/21/17 1623 12/21/17 1631 12/21/17 1703 12/21/17 1704   BP: 118/73 122/66 (!) 134/92 137/89   Pulse:       Resp:       Temp:       TempSrc:       SpO2: 99% 100% 100% 100%   Weight:       Height:           CHIEF COMPLAINT       Chief Complaint   Patient presents with    Dizziness             DIAGNOSTIC RESULTS             RADIOLOGY:   XR CHEST (SINGLE VIEW FRONTAL)   Final Result   No acute process. CT HEAD WO CONTRAST   Final Result   No acute intracranial abnormality.                LABS:  Labs Reviewed   CBC - Abnormal; Notable for the following:        Result Value MCV 81.6 (*)     All other components within normal limits   BASIC METABOLIC PANEL - Abnormal; Notable for the following:     Glucose 154 (*)     All other components within normal limits   POCT URINE PREGNANCY - Normal   URINE DRUG SCREEN   POCT TROPONIN   POCT TROPONIN   POCT TROPONIN         EMERGENCY DEPARTMENT COURSE:     -------------------------  BP: 137/89, Temp: 97.7 °F (36.5 °C), Pulse: 88, Resp: 16      Comments            Prado MD, F.A.C.E.P.   Attending Emergency Physician         Caroline Spring MD  12/21/17 5014

## 2018-01-08 ENCOUNTER — HOSPITAL ENCOUNTER (OUTPATIENT)
Dept: PHYSICAL THERAPY | Facility: CLINIC | Age: 43
Setting detail: THERAPIES SERIES
Discharge: HOME | End: 2018-01-08
Payer: MEDICARE

## 2018-01-08 NOTE — DISCHARGE SUMMARY
secondary to pain                                        [x] ? Strength: decreased postural muscle strength                   [x] ? Function: see Oswestry from low back  [x] Postural Deviations: forward head, rounded shoulders, reversed lordosis  [] Gait Deviations  [] Other:                                      LTG: (to be met in 12 treatments)- Additional goals added to lumbar spine POC  1. ? Pain: Pt will report less than or equal to 4/10 L rib pain upon palpation in order to improve tolerance to turning over the L shoulder and completing ADLs.   2. ? ROM: Pt will be able to demonstrate an ideal posture with ears inline with the shoulders and scapulas retracted for 3 minutes without back support and minimal verbal cueing from therapist in order to improve tolerance to upright sitting when completing ADLs. 3. ? Strength: Pt will demonstrate 5/5 B shoulder strength along with 4-/5 parascapular strength in order to promote an upright posture when sitting, standing, walking, and completing ADLs.    G-Codes: Initial 9/26/17  Functional Limitation: Mobility: walking and moving around  Functional Assessment Used: Oswestry: 39/50, 78% impairment  Current Status Modifier: CL  Goal Status Modifier: CK       Treatment to Date:  [x] Therapeutic Exercise    [] Modalities:  [] Therapeutic Activity    [] Ultrasound  [] Electrical Stimulation  [] Gait Training     [] Massage       [] Lumbar/Cervical Traction  [] Neuromuscular Re-education [] Cold/hotpack [] Iontophoresis: 4 mg/mL  [x] Instruction in Home Exercise Program                     Dexamethasone Sodium  [] Manual Therapy             Phosphate 40-80 mAmin  [] Aquatic Therapy                   [] Vasocompression/    [] Other:             Game Ready    Discharge Status:     [] Pt recovered from conditions. Treatment goals were met. [] Pt received maximum benefit. No further therapy indicated at this time.     [] Pt to continue exercise/home instructions independently. [] Therapy interrupted due to:    [x] Pt has 2 or more no shows/cancels, is discontinued per our policy. [] Pt has completed prescribed number of treatment sessions. [x] Other: Pt did not show for appt on 10/19/17. Pt was called and instructed to call and reschedule appointments. Pt did not reschedule a follow-up appointment. Electronically signed by Yesenia Romano PT on 1/8/2018 at 8:17 AM      If you have any questions or concerns, please don't hesitate to call.   Thank you for your referral.

## 2018-01-09 ENCOUNTER — HOSPITAL ENCOUNTER (OUTPATIENT)
Dept: PREADMISSION TESTING | Age: 43
Discharge: HOME | End: 2018-01-09
Payer: MEDICARE

## 2018-01-09 VITALS
WEIGHT: 222.66 LBS | RESPIRATION RATE: 18 BRPM | HEART RATE: 85 BPM | HEIGHT: 63 IN | SYSTOLIC BLOOD PRESSURE: 117 MMHG | TEMPERATURE: 97.9 F | BODY MASS INDEX: 39.45 KG/M2 | OXYGEN SATURATION: 98 % | DIASTOLIC BLOOD PRESSURE: 84 MMHG

## 2018-01-09 RX ORDER — SODIUM CHLORIDE, SODIUM LACTATE, POTASSIUM CHLORIDE, CALCIUM CHLORIDE 600; 310; 30; 20 MG/100ML; MG/100ML; MG/100ML; MG/100ML
1000 INJECTION, SOLUTION INTRAVENOUS CONTINUOUS
Status: CANCELLED | OUTPATIENT
Start: 2018-01-09

## 2018-01-09 ASSESSMENT — PAIN SCALES - GENERAL: PAINLEVEL_OUTOF10: 8

## 2018-01-09 ASSESSMENT — PAIN DESCRIPTION - LOCATION: LOCATION: BREAST

## 2018-01-09 ASSESSMENT — PAIN DESCRIPTION - ORIENTATION: ORIENTATION: LEFT

## 2018-01-09 NOTE — PROGRESS NOTES
Anesthesia Focused Assessment    STOP-BANG Sleep Apnea Questionnaire    SNORE loudly (heard through closed doors)? Yes  TIRED, fatigued, sleepy during daytime? Yes  OBSERVED stopping breathing during sleep? No  High blood PRESSURE being treated? Yes    BMI over 35? Yes  AGE over 48? No  NECK circumference over 16\"? No  GENDER (male)? No             Total 4  High risk 5-8  Intermediate risk 3-4  Low risk 0-2    Obstructive Sleep Apnea: snores  If YES, machine used: no     Type 1 DM:   no  T2DM:  History of diabetes, no longer on medications. Coronary Artery Disease:  no  Hypertension:  yes    Active smoker:  1/2 ppd for 23 years, quit 2015  Drinks Alcohol:  no    Dentition: upper and lower full dentures    Defib / AICD / Pacemaker: no      Renal Failure/dialysis:  no    Patient was evaluated in PAT & anesthesia guidelines were applied. NPO guidelines, medication instructions and scheduled arrival time were reviewed with patient.     Hx of anesthesia complications:  no  Family hx of anesthesia complications:  no                                                                                                                     Anesthesia contacted:   no  Medical or cardiac clearance ordered: no    ANGELA BERRIOS PA-C  1/9/18  2:01 PM

## 2018-01-10 RX ORDER — CLINDAMYCIN PHOSPHATE 900 MG/50ML
900 INJECTION INTRAVENOUS ONCE
Status: CANCELLED | OUTPATIENT
Start: 2018-01-10 | End: 2018-01-10

## 2018-01-15 ENCOUNTER — ANESTHESIA (OUTPATIENT)
Dept: OPERATING ROOM | Age: 43
End: 2018-01-15
Payer: MEDICARE

## 2018-01-15 ENCOUNTER — ANESTHESIA EVENT (OUTPATIENT)
Dept: OPERATING ROOM | Age: 43
End: 2018-01-15
Payer: MEDICARE

## 2018-01-15 ENCOUNTER — HOSPITAL ENCOUNTER (OUTPATIENT)
Age: 43
Setting detail: OUTPATIENT SURGERY
Discharge: HOME | End: 2018-01-15
Attending: SURGERY | Admitting: SURGERY
Payer: MEDICARE

## 2018-01-15 VITALS — SYSTOLIC BLOOD PRESSURE: 129 MMHG | DIASTOLIC BLOOD PRESSURE: 86 MMHG | OXYGEN SATURATION: 93 % | TEMPERATURE: 96.7 F

## 2018-01-15 VITALS
DIASTOLIC BLOOD PRESSURE: 91 MMHG | OXYGEN SATURATION: 97 % | SYSTOLIC BLOOD PRESSURE: 128 MMHG | RESPIRATION RATE: 20 BRPM | WEIGHT: 210 LBS | HEIGHT: 63 IN | BODY MASS INDEX: 37.21 KG/M2 | TEMPERATURE: 97.9 F | HEART RATE: 88 BPM

## 2018-01-15 DIAGNOSIS — D17.1 LIPOMA OF CHEST WALL: Primary | ICD-10-CM

## 2018-01-15 LAB
GLUCOSE BLD-MCNC: 141 MG/DL (ref 65–105)
GLUCOSE BLD-MCNC: 156 MG/DL (ref 65–105)
HCG, PREGNANCY URINE (POC): NEGATIVE

## 2018-01-15 PROCEDURE — 2580000003 HC RX 258: Performed by: SURGERY

## 2018-01-15 PROCEDURE — 88304 TISSUE EXAM BY PATHOLOGIST: CPT

## 2018-01-15 PROCEDURE — 3700000000 HC ANESTHESIA ATTENDED CARE: Performed by: SURGERY

## 2018-01-15 PROCEDURE — 82947 ASSAY GLUCOSE BLOOD QUANT: CPT

## 2018-01-15 PROCEDURE — 7100000040 HC SPAR PHASE II RECOVERY - FIRST 15 MIN: Performed by: SURGERY

## 2018-01-15 PROCEDURE — 2500000003 HC RX 250 WO HCPCS: Performed by: NURSE ANESTHETIST, CERTIFIED REGISTERED

## 2018-01-15 PROCEDURE — 7100000041 HC SPAR PHASE II RECOVERY - ADDTL 15 MIN: Performed by: SURGERY

## 2018-01-15 PROCEDURE — 7100000000 HC PACU RECOVERY - FIRST 15 MIN: Performed by: SURGERY

## 2018-01-15 PROCEDURE — A4364 ADHESIVE, LIQUID OR EQUAL: HCPCS | Performed by: SURGERY

## 2018-01-15 PROCEDURE — 3600000003 HC SURGERY LEVEL 3 BASE: Performed by: SURGERY

## 2018-01-15 PROCEDURE — 84703 CHORIONIC GONADOTROPIN ASSAY: CPT

## 2018-01-15 PROCEDURE — 2500000003 HC RX 250 WO HCPCS: Performed by: SURGERY

## 2018-01-15 PROCEDURE — 2580000003 HC RX 258: Performed by: ANESTHESIOLOGY

## 2018-01-15 PROCEDURE — 6360000002 HC RX W HCPCS: Performed by: ANESTHESIOLOGY

## 2018-01-15 PROCEDURE — A6402 STERILE GAUZE <= 16 SQ IN: HCPCS | Performed by: SURGERY

## 2018-01-15 PROCEDURE — 3700000001 HC ADD 15 MINUTES (ANESTHESIA): Performed by: SURGERY

## 2018-01-15 PROCEDURE — 6370000000 HC RX 637 (ALT 250 FOR IP): Performed by: ANESTHESIOLOGY

## 2018-01-15 PROCEDURE — 7100000001 HC PACU RECOVERY - ADDTL 15 MIN: Performed by: SURGERY

## 2018-01-15 PROCEDURE — 3600000013 HC SURGERY LEVEL 3 ADDTL 15MIN: Performed by: SURGERY

## 2018-01-15 PROCEDURE — A6403 STERILE GAUZE>16 <= 48 SQ IN: HCPCS | Performed by: SURGERY

## 2018-01-15 PROCEDURE — 6360000002 HC RX W HCPCS: Performed by: NURSE ANESTHETIST, CERTIFIED REGISTERED

## 2018-01-15 RX ORDER — CLINDAMYCIN PHOSPHATE 900 MG/50ML
900 INJECTION INTRAVENOUS ONCE
Status: COMPLETED | OUTPATIENT
Start: 2018-01-15 | End: 2018-01-15

## 2018-01-15 RX ORDER — BUPIVACAINE HYDROCHLORIDE 2.5 MG/ML
INJECTION, SOLUTION INFILTRATION; PERINEURAL PRN
Status: DISCONTINUED | OUTPATIENT
Start: 2018-01-15 | End: 2018-01-15 | Stop reason: HOSPADM

## 2018-01-15 RX ORDER — OXYCODONE HYDROCHLORIDE AND ACETAMINOPHEN 5; 325 MG/1; MG/1
1 TABLET ORAL
Status: COMPLETED | OUTPATIENT
Start: 2018-01-15 | End: 2018-01-15

## 2018-01-15 RX ORDER — FENTANYL CITRATE 50 UG/ML
50 INJECTION, SOLUTION INTRAMUSCULAR; INTRAVENOUS EVERY 5 MIN PRN
Status: DISCONTINUED | OUTPATIENT
Start: 2018-01-15 | End: 2018-01-15 | Stop reason: HOSPADM

## 2018-01-15 RX ORDER — MIDAZOLAM HYDROCHLORIDE 1 MG/ML
INJECTION INTRAMUSCULAR; INTRAVENOUS PRN
Status: DISCONTINUED | OUTPATIENT
Start: 2018-01-15 | End: 2018-01-15 | Stop reason: SDUPTHER

## 2018-01-15 RX ORDER — PROPOFOL 10 MG/ML
INJECTION, EMULSION INTRAVENOUS PRN
Status: DISCONTINUED | OUTPATIENT
Start: 2018-01-15 | End: 2018-01-15 | Stop reason: SDUPTHER

## 2018-01-15 RX ORDER — OXYCODONE HYDROCHLORIDE AND ACETAMINOPHEN 5; 325 MG/1; MG/1
1 TABLET ORAL EVERY 6 HOURS PRN
Qty: 28 TABLET | Refills: 0 | Status: SHIPPED | OUTPATIENT
Start: 2018-01-15 | End: 2018-01-22

## 2018-01-15 RX ORDER — DEXAMETHASONE SODIUM PHOSPHATE 10 MG/ML
INJECTION INTRAMUSCULAR; INTRAVENOUS PRN
Status: DISCONTINUED | OUTPATIENT
Start: 2018-01-15 | End: 2018-01-15 | Stop reason: SDUPTHER

## 2018-01-15 RX ORDER — FENTANYL CITRATE 50 UG/ML
INJECTION, SOLUTION INTRAMUSCULAR; INTRAVENOUS PRN
Status: DISCONTINUED | OUTPATIENT
Start: 2018-01-15 | End: 2018-01-15 | Stop reason: SDUPTHER

## 2018-01-15 RX ORDER — FENTANYL CITRATE 50 UG/ML
25 INJECTION, SOLUTION INTRAMUSCULAR; INTRAVENOUS EVERY 5 MIN PRN
Status: DISCONTINUED | OUTPATIENT
Start: 2018-01-15 | End: 2018-01-15 | Stop reason: HOSPADM

## 2018-01-15 RX ORDER — ONDANSETRON 2 MG/ML
INJECTION INTRAMUSCULAR; INTRAVENOUS PRN
Status: DISCONTINUED | OUTPATIENT
Start: 2018-01-15 | End: 2018-01-15 | Stop reason: SDUPTHER

## 2018-01-15 RX ORDER — SODIUM CHLORIDE, SODIUM LACTATE, POTASSIUM CHLORIDE, CALCIUM CHLORIDE 600; 310; 30; 20 MG/100ML; MG/100ML; MG/100ML; MG/100ML
1000 INJECTION, SOLUTION INTRAVENOUS CONTINUOUS
Status: DISCONTINUED | OUTPATIENT
Start: 2018-01-15 | End: 2018-01-15 | Stop reason: HOSPADM

## 2018-01-15 RX ORDER — ONDANSETRON 2 MG/ML
INJECTION INTRAMUSCULAR; INTRAVENOUS
Status: DISCONTINUED
Start: 2018-01-15 | End: 2018-01-15 | Stop reason: HOSPADM

## 2018-01-15 RX ORDER — MAGNESIUM HYDROXIDE 1200 MG/15ML
LIQUID ORAL CONTINUOUS PRN
Status: DISCONTINUED | OUTPATIENT
Start: 2018-01-15 | End: 2018-01-15 | Stop reason: HOSPADM

## 2018-01-15 RX ORDER — LIDOCAINE HYDROCHLORIDE 10 MG/ML
INJECTION, SOLUTION EPIDURAL; INFILTRATION; INTRACAUDAL; PERINEURAL PRN
Status: DISCONTINUED | OUTPATIENT
Start: 2018-01-15 | End: 2018-01-15 | Stop reason: SDUPTHER

## 2018-01-15 RX ORDER — MORPHINE SULFATE 10 MG/ML
INJECTION, SOLUTION INTRAMUSCULAR; INTRAVENOUS PRN
Status: DISCONTINUED | OUTPATIENT
Start: 2018-01-15 | End: 2018-01-15 | Stop reason: SDUPTHER

## 2018-01-15 RX ORDER — ONDANSETRON 2 MG/ML
4 INJECTION INTRAMUSCULAR; INTRAVENOUS
Status: COMPLETED | OUTPATIENT
Start: 2018-01-15 | End: 2018-01-15

## 2018-01-15 RX ADMIN — ONDANSETRON 4 MG: 2 INJECTION INTRAMUSCULAR; INTRAVENOUS at 11:10

## 2018-01-15 RX ADMIN — FENTANYL CITRATE 25 MCG: 50 INJECTION INTRAMUSCULAR; INTRAVENOUS at 09:39

## 2018-01-15 RX ADMIN — PROPOFOL 200 MG: 10 INJECTION, EMULSION INTRAVENOUS at 09:11

## 2018-01-15 RX ADMIN — DEXAMETHASONE SODIUM PHOSPHATE 10 MG: 10 INJECTION INTRAMUSCULAR; INTRAVENOUS at 09:28

## 2018-01-15 RX ADMIN — OXYCODONE HYDROCHLORIDE AND ACETAMINOPHEN 1 TABLET: 5; 325 TABLET ORAL at 11:50

## 2018-01-15 RX ADMIN — ONDANSETRON 4 MG: 2 INJECTION INTRAMUSCULAR; INTRAVENOUS at 10:22

## 2018-01-15 RX ADMIN — FENTANYL CITRATE 50 MCG: 50 INJECTION, SOLUTION INTRAMUSCULAR; INTRAVENOUS at 11:13

## 2018-01-15 RX ADMIN — FENTANYL CITRATE 50 MCG: 50 INJECTION INTRAMUSCULAR; INTRAVENOUS at 09:11

## 2018-01-15 RX ADMIN — CLINDAMYCIN PHOSPHATE 900 MG: 18 INJECTION, SOLUTION INTRAVENOUS at 09:13

## 2018-01-15 RX ADMIN — MIDAZOLAM HYDROCHLORIDE 2 MG: 1 INJECTION, SOLUTION INTRAMUSCULAR; INTRAVENOUS at 09:06

## 2018-01-15 RX ADMIN — LIDOCAINE HYDROCHLORIDE 50 MG: 10 INJECTION, SOLUTION EPIDURAL; INFILTRATION; INTRACAUDAL; PERINEURAL at 09:11

## 2018-01-15 RX ADMIN — SODIUM CHLORIDE, POTASSIUM CHLORIDE, SODIUM LACTATE AND CALCIUM CHLORIDE 1000 ML: 600; 310; 30; 20 INJECTION, SOLUTION INTRAVENOUS at 08:44

## 2018-01-15 RX ADMIN — MORPHINE SULFATE 5 MG: 10 INJECTION INTRAVENOUS at 09:43

## 2018-01-15 RX ADMIN — MORPHINE SULFATE 5 MG: 10 INJECTION INTRAVENOUS at 09:46

## 2018-01-15 RX ADMIN — FENTANYL CITRATE 50 MCG: 50 INJECTION INTRAMUSCULAR; INTRAVENOUS at 09:20

## 2018-01-15 RX ADMIN — FENTANYL CITRATE 50 MCG: 50 INJECTION, SOLUTION INTRAMUSCULAR; INTRAVENOUS at 11:25

## 2018-01-15 ASSESSMENT — PULMONARY FUNCTION TESTS
PIF_VALUE: 7
PIF_VALUE: 3
PIF_VALUE: 13
PIF_VALUE: 19
PIF_VALUE: 0
PIF_VALUE: 13
PIF_VALUE: 0
PIF_VALUE: 19
PIF_VALUE: 13
PIF_VALUE: 13
PIF_VALUE: 19
PIF_VALUE: 1
PIF_VALUE: 13
PIF_VALUE: 23
PIF_VALUE: 13
PIF_VALUE: 19
PIF_VALUE: 13
PIF_VALUE: 19
PIF_VALUE: 13
PIF_VALUE: 2
PIF_VALUE: 19
PIF_VALUE: 3
PIF_VALUE: 13
PIF_VALUE: 19
PIF_VALUE: 13
PIF_VALUE: 13
PIF_VALUE: 9
PIF_VALUE: 19
PIF_VALUE: 19
PIF_VALUE: 13
PIF_VALUE: 19
PIF_VALUE: 0
PIF_VALUE: 13
PIF_VALUE: 12
PIF_VALUE: 19
PIF_VALUE: 0
PIF_VALUE: 19
PIF_VALUE: 13
PIF_VALUE: 19
PIF_VALUE: 1
PIF_VALUE: 13
PIF_VALUE: 0
PIF_VALUE: 19
PIF_VALUE: 7
PIF_VALUE: 1
PIF_VALUE: 13
PIF_VALUE: 19
PIF_VALUE: 1
PIF_VALUE: 19
PIF_VALUE: 1
PIF_VALUE: 19
PIF_VALUE: 13
PIF_VALUE: 19
PIF_VALUE: 9
PIF_VALUE: 13
PIF_VALUE: 13
PIF_VALUE: 5
PIF_VALUE: 13
PIF_VALUE: 14
PIF_VALUE: 19
PIF_VALUE: 13
PIF_VALUE: 0
PIF_VALUE: 13
PIF_VALUE: 19
PIF_VALUE: 14
PIF_VALUE: 13
PIF_VALUE: 19
PIF_VALUE: 16
PIF_VALUE: 13
PIF_VALUE: 19

## 2018-01-15 ASSESSMENT — PAIN - FUNCTIONAL ASSESSMENT: PAIN_FUNCTIONAL_ASSESSMENT: 0-10

## 2018-01-15 ASSESSMENT — LIFESTYLE VARIABLES: SMOKING_STATUS: 1

## 2018-01-15 ASSESSMENT — PAIN DESCRIPTION - PAIN TYPE: TYPE: SURGICAL PAIN

## 2018-01-15 ASSESSMENT — PAIN SCALES - GENERAL
PAINLEVEL_OUTOF10: 7
PAINLEVEL_OUTOF10: 7
PAINLEVEL_OUTOF10: 6

## 2018-01-15 ASSESSMENT — PAIN DESCRIPTION - LOCATION: LOCATION: CHEST

## 2018-01-15 ASSESSMENT — PAIN DESCRIPTION - ORIENTATION: ORIENTATION: LEFT

## 2018-01-15 NOTE — ANESTHESIA PRE PROCEDURE
Department of Anesthesiology  Preprocedure Note       Name:  Rojelio Mcleod   Age:  41 y.o.  :  1975                                          MRN:  [id number]         Date:  1/15/2018      Surgeon: Alberta Faust):  Wilmer Steve DO    Procedure: Procedure(s):  EXCISION LIPOMA CHEST WALL    Medications prior to admission:   Prior to Admission medications    Medication Sig Start Date End Date Taking? Authorizing Provider   amLODIPine (NORVASC) 10 MG tablet Take 1 tablet by mouth daily 17  Yes Aditya Avelar MD   acetaminophen (TYLENOL) 325 MG tablet Take 2 tablets by mouth every 6 hours as needed for Pain 17  Yes Wisam Martin PA-C   SPIRIVA HANDIHALER 18 MCG inhalation capsule inhale contents of 1 capsule by mouth once daily 17  Yes Shaggy Perez MD   TRINTELLIX 10 MG TABS tablet Take 1 tablet by mouth daily 7/10/17  Yes Historical Provider, MD   gabapentin (NEURONTIN) 400 MG capsule Take 1 capsule by mouth 3 times daily . Will cause drowsiness.  17  Yes Aditya Avelar MD   amphetamine-dextroamphetamine (ADDERALL XR) 15 MG extended release capsule take 1 capsule by mouth every morning 17  Yes Historical Provider, MD   dicyclomine (BENTYL) 20 MG tablet Take 1 tablet by mouth 3 times daily as needed (cramping) 17  Yes Marlene Angeles DO   albuterol sulfate HFA (PROAIR HFA) 108 (90 BASE) MCG/ACT inhaler Inhale 2 puffs into the lungs every 4 hours as needed for Wheezing 16  Yes Roland Casanova PA-C   busPIRone (BUSPAR) 30 MG tablet Take 1 tablet by mouth 2 times daily 16  Yes Historical Provider, MD ALMANZAR VITAMIN B-12 TR 1000 MCG TBCR Take 1 tablet by mouth daily 16  Yes Aditya Avelar MD   QUEtiapine (SEROQUEL XR) 300 MG XR tablet Take 1 tablet by mouth nightly 6/10/16  Yes Miroslava Rankin DO   Lancets MISC Use__1_times daily    Diagnisis:250.0  Diabetes Mellitus____Insulin Dependent_x__Non-Insulin Dependent 5/10/16  Yes Elsa Najera

## 2018-01-15 NOTE — H&P
pain        PHYSICAL EXAM:    VITALS:  BP (!) 131/92 (Site: Left Arm, Position: Sitting, Cuff Size: Large Adult)  Pulse 89  Temp 97 °F (36.1 °C) (Temporal)   Ht 5' 2.99\" (1.6 m)  Wt 219 lb (99.3 kg)  LMP 09/02/2017 (Approximate)  BMI 38.8 kg/m2  INTAKE/OUTPUT:   No intake or output data in the 24 hours ending 10/04/17 1225        CONSTITUTIONAL:  Alert and oriented, tearful  CHEST: left chest wall tenderness along 4-5th rib/intercostal space. Exquisite tenderness to palpation over this area  BREAST: palpable lipoma around 4cm in size noted along the left lateral aspect of breast, no discharge noted on exam, no concerning features to the nipple aerolar complex, right breast without any concerning masses palpable, dense breast tissue palpable bilaterally   NECK: trachea midline   LUNGS:  Good air movement bilaterally  CARDIOVASCULAR: Regular rate and rhythm  ABDOMEN: soft, non tender, non distended, no rebound or guarding  MUSCULOSKELETAL:  Equal strength bilaterally, normal muscle tone  SKIN: No abscess or rash  NEUROLOGIC:  no focal deficits  PSYCH: affect appropriate        ASSESSMENT       Patient Active Problem List   Diagnosis    Lumbar back pain    Depression    Fibromyalgia    Anxiety    Type 2 diabetes mellitus without complication (HCC)    Iron deficiency anemia    Mixed hyperlipidemia    Rib pain on right side    Facet degeneration of lumbar region      1. Neuropathic pain likely secondary to 4-5th rib dysfunction on the left. Reviewed recent CXR without any obvious fractures. May also be costochondritis. 2. Left breast/chest lipoma       The above office note was reviewed with the patient. No significant changes to the medical history. Pain has not improved since last seen. Discussed with pt that this will likely not resolve her chest wall pain. No significant changes to physical exam.    Will proceed with excision of L chest lipoma.    Anticipate discharge after procedure  Will have pt

## 2018-01-16 LAB — SURGICAL PATHOLOGY REPORT: NORMAL

## 2018-01-16 NOTE — OP NOTE
with breast tissue and was clearly a lipoma. She  eventually followed up with us in the [facility], complaining of  unbearable pain to the left side of her chest.  It was explained thoroughly  to the patient that the cause of her pain was likely not due to the lipoma  and that it was more likely due to her previous trauma or due to possible  Somatic dysfunction involving the ribs and the left chest wall; however, with  this mind, the patient was still adamant about proceeding with excisional biopsy  of the left chest wall lipoma. All the risks, benefits, and alternatives  were discussed with the patient and all questions were answered and  witnessed by a medical assistant witness in the office. Informed consent  was obtained at this time. PROCEDURE:      The patient was taken to the operating room and placed in the  supine position. General endotracheal anesthesia was obtained at this time  and the patient was then prepped and draped in sterile fashion. A timeout  was performed, confirming all personnel present, the patient ID, and the  procedure to be performed. Antibiotics were provided in accordance with  [facility] protocol and at this time, we proceeded with our procedure. The left  chest wall lipoma was approximately 6-8 cm, palpable, and appeared to be  quite deep on physical exam and likely attached to the muscles of the chest  wall. An approximately 4 cm incision was made in a line of Shahrzad using a  #15 blade and the dissection was continued using Bovie electrocautery  through the dermis to subcutaneous tissue. At this time, we carefully  carried our dissection through the subcutaneous tissue until we identified  the capsule of what appeared to be a lipoma that also appeared to be  attached to the underlying chest wall. At this time, using a combination  of sharp dissection using Metzenbaums and Bovie electrocautery, we  carefully carried our dissection circumferentially around the lipoma.

## 2018-01-22 ENCOUNTER — TELEPHONE (OUTPATIENT)
Dept: FAMILY MEDICINE CLINIC | Age: 43
End: 2018-01-22

## 2018-01-22 NOTE — TELEPHONE ENCOUNTER
Patient called stating she was having issues with her incision. Patient reports having a lot of drainage, pain, and site being warm to touch. Patient at this time was advised to precede to [facility] [address]'s ed to be further evaluated.

## 2018-01-23 ENCOUNTER — HOSPITAL ENCOUNTER (EMERGENCY)
Age: 43
Discharge: HOME | End: 2018-01-23
Attending: EMERGENCY MEDICINE
Payer: MEDICARE

## 2018-01-23 VITALS
DIASTOLIC BLOOD PRESSURE: 101 MMHG | TEMPERATURE: 97 F | SYSTOLIC BLOOD PRESSURE: 141 MMHG | OXYGEN SATURATION: 98 % | HEART RATE: 96 BPM | RESPIRATION RATE: 16 BRPM

## 2018-01-23 DIAGNOSIS — N64.4 BREAST PAIN, LEFT: Primary | ICD-10-CM

## 2018-01-23 PROCEDURE — 99283 EMERGENCY DEPT VISIT LOW MDM: CPT

## 2018-01-23 PROCEDURE — 6370000000 HC RX 637 (ALT 250 FOR IP): Performed by: EMERGENCY MEDICINE

## 2018-01-23 RX ORDER — OXYCODONE HYDROCHLORIDE AND ACETAMINOPHEN 5; 325 MG/1; MG/1
1 TABLET ORAL ONCE
Status: COMPLETED | OUTPATIENT
Start: 2018-01-23 | End: 2018-01-23

## 2018-01-23 RX ORDER — OXYCODONE HYDROCHLORIDE AND ACETAMINOPHEN 5; 325 MG/1; MG/1
1-2 TABLET ORAL EVERY 6 HOURS PRN
Qty: 5 TABLET | Refills: 0 | Status: SHIPPED | OUTPATIENT
Start: 2018-01-23 | End: 2018-01-30

## 2018-01-23 RX ADMIN — OXYCODONE HYDROCHLORIDE AND ACETAMINOPHEN 1 TABLET: 5; 325 TABLET ORAL at 13:07

## 2018-01-23 ASSESSMENT — PAIN SCALES - GENERAL: PAINLEVEL_OUTOF10: 10

## 2018-01-23 ASSESSMENT — ENCOUNTER SYMPTOMS
PHOTOPHOBIA: 0
SHORTNESS OF BREATH: 0
DIARRHEA: 0
COUGH: 0
VOMITING: 0
RHINORRHEA: 1
STRIDOR: 0
WHEEZING: 0
ABDOMINAL PAIN: 0
NAUSEA: 0

## 2018-01-23 ASSESSMENT — PAIN DESCRIPTION - PAIN TYPE: TYPE: CHRONIC PAIN

## 2018-01-23 ASSESSMENT — PAIN DESCRIPTION - FREQUENCY: FREQUENCY: CONTINUOUS

## 2018-01-23 ASSESSMENT — PAIN DESCRIPTION - LOCATION: LOCATION: BREAST

## 2018-01-23 ASSESSMENT — PAIN DESCRIPTION - ORIENTATION: ORIENTATION: LEFT

## 2018-01-23 NOTE — ED PROVIDER NOTES
5 tablet     Refill:  0         DIAGNOSTIC RESULTS / EMERGENCY DEPARTMENT COURSE / [facility]     LABS:  No results found for this visit on 01/23/18. RADIOLOGY:  None    EKG  None    All EKG's are interpreted by the Emergency Department Physician who either signs or Co-signs this chart in the absence of a cardiologist.    EMERGENCY DEPARTMENT COURSE:    Patient assessed at bedside she is in mild distress technique to breast pain. She'll be treated with by mouth Percocet. We will also consult general surgery as they perform surgery just over a week ago for evaluation. Spoke to general surgery resident who will be down to evaluate the patient. Patient evaluated by general surgery residents who states the patient can be safely discharged home with follow-up next week in the clinic. Patient provided additional Percocet for analgesia and discharged home. PROCEDURES:  None    CONSULTS:  IP CONSULT TO GENERAL SURGERY    CRITICAL CARE:  None    FINAL IMPRESSION      1. Breast pain, left          DISPOSITION / PLAN     DISPOSITION      Discharged    PATIENT REFERRED TO:  Arely Vivar [address] [phone]    Schedule an appointment as soon as possible for a visit   As needed      DISCHARGE MEDICATIONS:  New Prescriptions    OXYCODONE-ACETAMINOPHEN (PERCOCET) 5-325 MG PER TABLET    Take 1-2 tablets by mouth every 6 hours as needed for Pain for up to 7 days WARNING:  May cause drowsiness. May impair ability to operate vehicles or machinery. Do not use in combination with alcohol. Torrie Galvez MD  Emergency Medicine Resident    (Please note that portions of this note were completed with a voice recognition program.  Efforts were made to edit the dictations but occasionally words are mis-transcribed.)       Minoo Craig MD  Resident  01/23/18 2116

## 2018-06-04 ENCOUNTER — OFFICE VISIT (OUTPATIENT)
Dept: INTERNAL MEDICINE | Age: 43
End: 2018-06-04
Payer: MEDICARE

## 2018-06-04 VITALS
WEIGHT: 226.4 LBS | HEART RATE: 87 BPM | HEIGHT: 63 IN | SYSTOLIC BLOOD PRESSURE: 138 MMHG | DIASTOLIC BLOOD PRESSURE: 88 MMHG | BODY MASS INDEX: 40.11 KG/M2 | OXYGEN SATURATION: 98 %

## 2018-06-04 DIAGNOSIS — I10 ESSENTIAL HYPERTENSION: ICD-10-CM

## 2018-06-04 DIAGNOSIS — R73.03 PREDIABETES: Primary | ICD-10-CM

## 2018-06-04 DIAGNOSIS — Z23 NEED FOR PROPHYLACTIC VACCINATION AGAINST DIPHTHERIA-TETANUS-PERTUSSIS (DTP): ICD-10-CM

## 2018-06-04 DIAGNOSIS — Z13.220 SCREENING FOR HYPERLIPIDEMIA: ICD-10-CM

## 2018-06-04 DIAGNOSIS — M62.838 MUSCLE SPASM: ICD-10-CM

## 2018-06-04 DIAGNOSIS — G47.33 OSA (OBSTRUCTIVE SLEEP APNEA): ICD-10-CM

## 2018-06-04 DIAGNOSIS — K58.0 IRRITABLE BOWEL SYNDROME WITH DIARRHEA: ICD-10-CM

## 2018-06-04 DIAGNOSIS — J42 CHRONIC BRONCHITIS, UNSPECIFIED CHRONIC BRONCHITIS TYPE (HCC): ICD-10-CM

## 2018-06-04 DIAGNOSIS — R07.81 RIB PAIN ON RIGHT SIDE: ICD-10-CM

## 2018-06-04 DIAGNOSIS — F17.200 SMOKING: ICD-10-CM

## 2018-06-04 LAB — HBA1C MFR BLD: 6.6 %

## 2018-06-04 PROCEDURE — G8427 DOCREV CUR MEDS BY ELIG CLIN: HCPCS | Performed by: HOSPITALIST

## 2018-06-04 PROCEDURE — 82043 UR ALBUMIN QUANTITATIVE: CPT | Performed by: HOSPITALIST

## 2018-06-04 PROCEDURE — G8926 SPIRO NO PERF OR DOC: HCPCS | Performed by: HOSPITALIST

## 2018-06-04 PROCEDURE — 3023F SPIROM DOC REV: CPT | Performed by: HOSPITALIST

## 2018-06-04 PROCEDURE — 99213 OFFICE O/P EST LOW 20 MIN: CPT | Performed by: INTERNAL MEDICINE

## 2018-06-04 PROCEDURE — G8417 CALC BMI ABV UP PARAM F/U: HCPCS | Performed by: HOSPITALIST

## 2018-06-04 PROCEDURE — 83036 HEMOGLOBIN GLYCOSYLATED A1C: CPT | Performed by: HOSPITALIST

## 2018-06-04 PROCEDURE — 99213 OFFICE O/P EST LOW 20 MIN: CPT | Performed by: HOSPITALIST

## 2018-06-04 PROCEDURE — 4004F PT TOBACCO SCREEN RCVD TLK: CPT | Performed by: HOSPITALIST

## 2018-06-04 RX ORDER — NICOTINE 21 MG/24HR
1 PATCH, TRANSDERMAL 24 HOURS TRANSDERMAL EVERY 24 HOURS
Qty: 30 PATCH | Refills: 3 | Status: SHIPPED | OUTPATIENT
Start: 2018-06-04 | End: 2018-10-23

## 2018-06-04 RX ORDER — AZITHROMYCIN 250 MG/1
250 TABLET, FILM COATED ORAL DAILY
Qty: 10 TABLET | Refills: 0 | Status: CANCELLED | OUTPATIENT
Start: 2018-06-04 | End: 2018-06-14

## 2018-06-04 RX ORDER — ACETAMINOPHEN 325 MG/1
650 TABLET ORAL EVERY 6 HOURS PRN
Qty: 30 TABLET | Refills: 2 | Status: SHIPPED | OUTPATIENT
Start: 2018-06-04 | End: 2018-10-03

## 2018-06-04 RX ORDER — AZITHROMYCIN 250 MG/1
TABLET, FILM COATED ORAL
Qty: 1 PACKET | Refills: 0 | Status: SHIPPED | OUTPATIENT
Start: 2018-06-04 | End: 2018-06-08

## 2018-06-04 RX ORDER — ALBUTEROL SULFATE 90 UG/1
2 AEROSOL, METERED RESPIRATORY (INHALATION) EVERY 6 HOURS PRN
Qty: 2 INHALER | Refills: 3 | Status: SHIPPED | OUTPATIENT
Start: 2018-06-04 | End: 2019-02-14 | Stop reason: SDUPTHER

## 2018-06-04 RX ORDER — AMLODIPINE BESYLATE 10 MG/1
10 TABLET ORAL DAILY
Qty: 30 TABLET | Refills: 5 | Status: SHIPPED | OUTPATIENT
Start: 2018-06-04 | End: 2019-01-04 | Stop reason: SDUPTHER

## 2018-06-04 RX ORDER — GABAPENTIN 400 MG/1
400 CAPSULE ORAL 3 TIMES DAILY
Qty: 90 CAPSULE | Refills: 0 | Status: SHIPPED | OUTPATIENT
Start: 2018-06-04 | End: 2018-07-19 | Stop reason: SDUPTHER

## 2018-06-04 RX ORDER — CYCLOBENZAPRINE HCL 5 MG
TABLET ORAL
COMMUNITY
End: 2018-06-04 | Stop reason: SDUPTHER

## 2018-06-04 RX ORDER — CYCLOBENZAPRINE HCL 5 MG
5 TABLET ORAL 3 TIMES DAILY PRN
Qty: 90 TABLET | Refills: 2 | Status: SHIPPED | OUTPATIENT
Start: 2018-06-04 | End: 2018-07-19 | Stop reason: SDUPTHER

## 2018-06-04 RX ORDER — DICYCLOMINE HCL 20 MG
20 TABLET ORAL 3 TIMES DAILY PRN
Qty: 120 TABLET | Refills: 3 | Status: SHIPPED | OUTPATIENT
Start: 2018-06-04 | End: 2019-03-22 | Stop reason: SDUPTHER

## 2018-06-04 ASSESSMENT — PATIENT HEALTH QUESTIONNAIRE - PHQ9
SUM OF ALL RESPONSES TO PHQ9 QUESTIONS 1 & 2: 2
SUM OF ALL RESPONSES TO PHQ QUESTIONS 1-9: 2
1. LITTLE INTEREST OR PLEASURE IN DOING THINGS: 1
2. FEELING DOWN, DEPRESSED OR HOPELESS: 1

## 2018-07-19 DIAGNOSIS — R07.81 RIB PAIN ON RIGHT SIDE: ICD-10-CM

## 2018-07-19 DIAGNOSIS — M62.838 MUSCLE SPASM: ICD-10-CM

## 2018-07-19 RX ORDER — CYCLOBENZAPRINE HCL 5 MG
5 TABLET ORAL 3 TIMES DAILY PRN
Qty: 90 TABLET | Refills: 2 | Status: SHIPPED | OUTPATIENT
Start: 2018-07-19 | End: 2018-11-08 | Stop reason: SDUPTHER

## 2018-07-19 RX ORDER — GABAPENTIN 400 MG/1
400 CAPSULE ORAL 3 TIMES DAILY
Qty: 90 CAPSULE | Refills: 0 | Status: SHIPPED | OUTPATIENT
Start: 2018-07-19 | End: 2019-01-04

## 2018-07-19 NOTE — TELEPHONE ENCOUNTER
From: Viri Almanzar  Sent: 7/18/2018 11:10 PM EDT  Subject: Medication Renewal Request    Alisa Peck would like a refill of the following medications:     cyclobenzaprine (FLEXERIL) 5 MG tablet [Lamin Mccloud MD]   Patient Comment: Doesnt help with the spazams      gabapentin (NEURONTIN) 400 MG capsule Kalani Yates MD]   Patient Comment: Need a refill asap thank you     [facility] [address] [facility] [phone]

## 2018-07-21 ENCOUNTER — HOSPITAL ENCOUNTER (EMERGENCY)
Age: 43
Discharge: HOME | End: 2018-07-22
Attending: EMERGENCY MEDICINE
Payer: MEDICARE

## 2018-07-21 DIAGNOSIS — F41.1 ANXIETY STATE: Primary | ICD-10-CM

## 2018-07-21 DIAGNOSIS — F10.920 ACUTE ALCOHOLIC INTOXICATION WITHOUT COMPLICATION (HCC): ICD-10-CM

## 2018-07-21 PROCEDURE — 99283 EMERGENCY DEPT VISIT LOW MDM: CPT

## 2018-07-21 RX ORDER — DIPHENHYDRAMINE HYDROCHLORIDE 50 MG/ML
25 INJECTION INTRAMUSCULAR; INTRAVENOUS ONCE
Status: COMPLETED | OUTPATIENT
Start: 2018-07-22 | End: 2018-07-22

## 2018-07-21 RX ORDER — HALOPERIDOL 5 MG/ML
5 INJECTION INTRAMUSCULAR ONCE
Status: COMPLETED | OUTPATIENT
Start: 2018-07-22 | End: 2018-07-22

## 2018-07-21 RX ORDER — LORAZEPAM 2 MG/ML
2 INJECTION INTRAMUSCULAR ONCE
Status: COMPLETED | OUTPATIENT
Start: 2018-07-22 | End: 2018-07-22

## 2018-07-21 RX ORDER — 0.9 % SODIUM CHLORIDE 0.9 %
1000 INTRAVENOUS SOLUTION INTRAVENOUS ONCE
Status: COMPLETED | OUTPATIENT
Start: 2018-07-22 | End: 2018-07-22

## 2018-07-21 ASSESSMENT — PAIN SCALES - GENERAL: PAINLEVEL_OUTOF10: 6

## 2018-07-21 ASSESSMENT — PAIN DESCRIPTION - LOCATION: LOCATION: CHEST

## 2018-07-21 ASSESSMENT — PAIN DESCRIPTION - PAIN TYPE: TYPE: ACUTE PAIN

## 2018-07-22 VITALS
TEMPERATURE: 98.1 F | HEART RATE: 130 BPM | OXYGEN SATURATION: 100 % | SYSTOLIC BLOOD PRESSURE: 120 MMHG | DIASTOLIC BLOOD PRESSURE: 78 MMHG

## 2018-07-22 LAB
-: ABNORMAL
ABSOLUTE EOS #: 0.08 K/UL (ref 0–0.44)
ABSOLUTE IMMATURE GRANULOCYTE: 0.03 K/UL (ref 0–0.3)
ABSOLUTE LYMPH #: 3.51 K/UL (ref 1.1–3.7)
ABSOLUTE MONO #: 0.42 K/UL (ref 0.1–1.2)
ACETAMINOPHEN LEVEL: <5 UG/ML (ref 10–30)
AMORPHOUS: ABNORMAL
AMPHETAMINE SCREEN URINE: POSITIVE
ANION GAP SERPL CALCULATED.3IONS-SCNC: 22 MMOL/L (ref 9–17)
BACTERIA: ABNORMAL
BARBITURATE SCREEN URINE: NEGATIVE
BASOPHILS # BLD: 1 % (ref 0–2)
BASOPHILS ABSOLUTE: 0.04 K/UL (ref 0–0.2)
BENZODIAZEPINE SCREEN, URINE: NEGATIVE
BILIRUBIN URINE: NEGATIVE
BUN BLDV-MCNC: 10 MG/DL (ref 6–20)
BUN/CREAT BLD: ABNORMAL (ref 9–20)
BUPRENORPHINE URINE: ABNORMAL
CALCIUM SERPL-MCNC: 8.8 MG/DL (ref 8.6–10.4)
CANNABINOID SCREEN URINE: NEGATIVE
CASTS UA: ABNORMAL /LPF (ref 0–8)
CHLORIDE BLD-SCNC: 106 MMOL/L (ref 98–107)
CO2: 15 MMOL/L (ref 20–31)
COCAINE METABOLITE, URINE: NEGATIVE
COLOR: YELLOW
COMMENT UA: ABNORMAL
CREAT SERPL-MCNC: 1.01 MG/DL (ref 0.5–0.9)
CRYSTALS, UA: ABNORMAL /HPF
DIFFERENTIAL TYPE: ABNORMAL
EOSINOPHILS RELATIVE PERCENT: 1 % (ref 1–4)
EPITHELIAL CELLS UA: ABNORMAL /HPF (ref 0–5)
ETHANOL PERCENT: 0.23 %
ETHANOL: 229 MG/DL
GFR AFRICAN AMERICAN: >60 ML/MIN
GFR NON-AFRICAN AMERICAN: >60 ML/MIN
GFR SERPL CREATININE-BSD FRML MDRD: ABNORMAL ML/MIN/{1.73_M2}
GFR SERPL CREATININE-BSD FRML MDRD: ABNORMAL ML/MIN/{1.73_M2}
GLUCOSE BLD-MCNC: 180 MG/DL (ref 70–99)
GLUCOSE URINE: NEGATIVE
HCG QUALITATIVE: NEGATIVE
HCT VFR BLD CALC: 39.3 % (ref 36.3–47.1)
HEMOGLOBIN: 12.4 G/DL (ref 11.9–15.1)
IMMATURE GRANULOCYTES: 0 %
KETONES, URINE: NEGATIVE
LEUKOCYTE ESTERASE, URINE: NEGATIVE
LYMPHOCYTES # BLD: 46 % (ref 24–43)
MCH RBC QN AUTO: 25.7 PG (ref 25.2–33.5)
MCHC RBC AUTO-ENTMCNC: 31.6 G/DL (ref 28.4–34.8)
MCV RBC AUTO: 81.5 FL (ref 82.6–102.9)
MDMA URINE: ABNORMAL
METHADONE SCREEN, URINE: NEGATIVE
METHAMPHETAMINE, URINE: ABNORMAL
MONOCYTES # BLD: 6 % (ref 3–12)
MUCUS: ABNORMAL
NITRITE, URINE: NEGATIVE
NRBC AUTOMATED: 0 PER 100 WBC
OPIATES, URINE: NEGATIVE
OTHER OBSERVATIONS UA: ABNORMAL
OXYCODONE SCREEN URINE: NEGATIVE
PDW BLD-RTO: 15.7 % (ref 11.8–14.4)
PH UA: 5 (ref 5–8)
PHENCYCLIDINE, URINE: NEGATIVE
PLATELET # BLD: 312 K/UL (ref 138–453)
PLATELET ESTIMATE: ABNORMAL
PMV BLD AUTO: 10 FL (ref 8.1–13.5)
POTASSIUM SERPL-SCNC: 3 MMOL/L (ref 3.7–5.3)
PROPOXYPHENE, URINE: ABNORMAL
PROTEIN UA: ABNORMAL
RBC # BLD: 4.82 M/UL (ref 3.95–5.11)
RBC # BLD: ABNORMAL 10*6/UL
RBC UA: ABNORMAL /HPF (ref 0–4)
RENAL EPITHELIAL, UA: ABNORMAL /HPF
SALICYLATE LEVEL: <1 MG/DL (ref 3–10)
SEG NEUTROPHILS: 46 % (ref 36–65)
SEGMENTED NEUTROPHILS ABSOLUTE COUNT: 3.51 K/UL (ref 1.5–8.1)
SODIUM BLD-SCNC: 143 MMOL/L (ref 135–144)
SPECIFIC GRAVITY UA: 1.01 (ref 1–1.03)
TEST INFORMATION: ABNORMAL
TOXIC TRICYCLIC SC,BLOOD: NEGATIVE
TRICHOMONAS: ABNORMAL
TRICYCLIC ANTIDEPRESSANTS, UR: ABNORMAL
TURBIDITY: ABNORMAL
URINE HGB: NEGATIVE
UROBILINOGEN, URINE: NORMAL
WBC # BLD: 7.6 K/UL (ref 3.5–11.3)
WBC # BLD: ABNORMAL 10*3/UL
WBC UA: ABNORMAL /HPF (ref 0–5)
YEAST: ABNORMAL

## 2018-07-22 PROCEDURE — G0480 DRUG TEST DEF 1-7 CLASSES: HCPCS

## 2018-07-22 PROCEDURE — 81001 URINALYSIS AUTO W/SCOPE: CPT

## 2018-07-22 PROCEDURE — 80307 DRUG TEST PRSMV CHEM ANLYZR: CPT

## 2018-07-22 PROCEDURE — 6360000002 HC RX W HCPCS: Performed by: STUDENT IN AN ORGANIZED HEALTH CARE EDUCATION/TRAINING PROGRAM

## 2018-07-22 PROCEDURE — 85025 COMPLETE CBC W/AUTO DIFF WBC: CPT

## 2018-07-22 PROCEDURE — 84703 CHORIONIC GONADOTROPIN ASSAY: CPT

## 2018-07-22 PROCEDURE — 96372 THER/PROPH/DIAG INJ SC/IM: CPT

## 2018-07-22 PROCEDURE — 80048 BASIC METABOLIC PNL TOTAL CA: CPT

## 2018-07-22 PROCEDURE — 2580000003 HC RX 258: Performed by: STUDENT IN AN ORGANIZED HEALTH CARE EDUCATION/TRAINING PROGRAM

## 2018-07-22 RX ORDER — 0.9 % SODIUM CHLORIDE 0.9 %
1000 INTRAVENOUS SOLUTION INTRAVENOUS ONCE
Status: COMPLETED | OUTPATIENT
Start: 2018-07-22 | End: 2018-07-22

## 2018-07-22 RX ADMIN — LORAZEPAM 2 MG: 2 INJECTION INTRAMUSCULAR; INTRAVENOUS at 00:07

## 2018-07-22 RX ADMIN — HALOPERIDOL LACTATE 5 MG: 5 INJECTION, SOLUTION INTRAMUSCULAR at 00:07

## 2018-07-22 RX ADMIN — SODIUM CHLORIDE 1000 ML: 9 INJECTION, SOLUTION INTRAVENOUS at 05:47

## 2018-07-22 RX ADMIN — SODIUM CHLORIDE 1000 ML: 9 INJECTION, SOLUTION INTRAVENOUS at 00:10

## 2018-07-22 RX ADMIN — DIPHENHYDRAMINE HYDROCHLORIDE 25 MG: 50 INJECTION, SOLUTION INTRAMUSCULAR; INTRAVENOUS at 00:10

## 2018-07-22 ASSESSMENT — ENCOUNTER SYMPTOMS
WHEEZING: 0
COUGH: 0
SHORTNESS OF BREATH: 1

## 2018-07-22 NOTE — ED NOTES
Rn at bedside, pt remains asleep this time  Pt on cardiac monitor     Fili Bowling RN  07/22/18 4412

## 2018-07-22 NOTE — ED NOTES
Pt resting in bed, provided PO fluids.      Irma Maher, [facility]  07/22/18 0961 [place]

## 2018-07-22 NOTE — ED NOTES
Received report from Corrinne Spindle, [place]  Introduced self to pt, pt is currently resting in bed. Will continue to monitor pt.      [facility]  07/22/18 2794

## 2018-07-27 ENCOUNTER — APPOINTMENT (OUTPATIENT)
Dept: GENERAL RADIOLOGY | Age: 43
End: 2018-07-27
Payer: MEDICARE

## 2018-07-27 ENCOUNTER — HOSPITAL ENCOUNTER (EMERGENCY)
Age: 43
Discharge: HOME | End: 2018-07-27
Attending: EMERGENCY MEDICINE
Payer: MEDICARE

## 2018-07-27 VITALS
SYSTOLIC BLOOD PRESSURE: 122 MMHG | DIASTOLIC BLOOD PRESSURE: 89 MMHG | OXYGEN SATURATION: 97 % | TEMPERATURE: 98.7 F | RESPIRATION RATE: 17 BRPM | WEIGHT: 225 LBS | BODY MASS INDEX: 39.87 KG/M2 | HEART RATE: 95 BPM | HEIGHT: 63 IN

## 2018-07-27 DIAGNOSIS — S86.001A ACHILLES TENDON INJURY, RIGHT, INITIAL ENCOUNTER: Primary | ICD-10-CM

## 2018-07-27 PROCEDURE — 73610 X-RAY EXAM OF ANKLE: CPT

## 2018-07-27 PROCEDURE — 99283 EMERGENCY DEPT VISIT LOW MDM: CPT

## 2018-07-27 PROCEDURE — 6370000000 HC RX 637 (ALT 250 FOR IP): Performed by: EMERGENCY MEDICINE

## 2018-07-27 RX ORDER — HYDROCODONE BITARTRATE AND ACETAMINOPHEN 5; 325 MG/1; MG/1
1 TABLET ORAL ONCE
Status: COMPLETED | OUTPATIENT
Start: 2018-07-27 | End: 2018-07-27

## 2018-07-27 RX ORDER — OXYCODONE HYDROCHLORIDE AND ACETAMINOPHEN 5; 325 MG/1; MG/1
1 TABLET ORAL EVERY 8 HOURS PRN
Qty: 5 TABLET | Refills: 0 | Status: SHIPPED | OUTPATIENT
Start: 2018-07-27 | End: 2018-07-28

## 2018-07-27 RX ADMIN — HYDROCODONE BITARTRATE AND ACETAMINOPHEN 1 TABLET: 5; 325 TABLET ORAL at 16:40

## 2018-07-27 ASSESSMENT — ENCOUNTER SYMPTOMS
DIARRHEA: 0
VOMITING: 0
SHORTNESS OF BREATH: 0
SORE THROAT: 0
RHINORRHEA: 0
EYE PAIN: 0
WHEEZING: 0
NAUSEA: 0
COUGH: 0
CHEST TIGHTNESS: 0
ABDOMINAL DISTENTION: 0
SINUS PAIN: 0
ABDOMINAL PAIN: 0

## 2018-07-27 ASSESSMENT — PAIN SCALES - GENERAL: PAINLEVEL_OUTOF10: 8

## 2018-07-27 NOTE — ED TRIAGE NOTES
Patient twisted right ankle twice in the last 2 days, pain to the bottom of the foot, shooting the pain around the ankle. Ice pack on the patient.  \"Taking Tylenol but not helping\"

## 2018-07-27 NOTE — ED PROVIDER NOTES
Nerve Block (Right, 01/20/2017); Nerve Block (Right, 04/05/2017); Nerve Block (Left, 08/11/2017); lipoma resection (Left, 01/15/2018); and pr exc skin benig 0.6-1cm face,facial (Left, 1/15/2018). Social History     Social History    Marital status: Single     Spouse name: N/A    Number of children: 2    Years of education: N/A     Occupational History    disability      Social History Main Topics    Smoking status: Current Some Day Smoker     Packs/day: 0.10     Years: 23.00     Types: Cigarettes     Last attempt to quit: 10/1/2015    Smokeless tobacco: Never Used    Alcohol use No    Drug use: No      Comment: hx marijuana quit 2004    Sexual activity: Yes     Partners: Female     Other Topics Concern    Not on file     Social History Narrative    No narrative on file       Family History   Problem Relation Age of Onset    Heart Disease Mother     Stroke Mother     High Blood Pressure Mother     High Blood Pressure Father     Diabetes Father         Allergies:  Dye [barium-containing compounds]; Nsaids; Demerol; Pcn [penicillins]; and Tramadol    Home Medications:  Prior to Admission medications    Medication Sig Start Date End Date Taking? Authorizing Provider   oxyCODONE-acetaminophen (PERCOCET) 5-325 MG per tablet Take 1 tablet by mouth every 8 hours as needed for Pain for up to 1 day. . 7/27/18 7/28/18 Yes Natividad Mitchell MD   cyclobenzaprine (FLEXERIL) 5 MG tablet Take 1 tablet by mouth 3 times daily as needed for Muscle spasms 7/19/18   Dedra Patterson MD   gabapentin (NEURONTIN) 400 MG capsule Take 1 capsule by mouth 3 times daily for 30 days. . Will cause drowsiness. . 7/19/18 8/18/18  Dedra Patterson MD   amLODIPine (NORVASC) 10 MG tablet Take 1 tablet by mouth daily 6/4/18   Gutierrez Santos MD   acetaminophen (TYLENOL) 325 MG tablet Take 2 tablets by mouth every 6 hours as needed for Pain 6/4/18   Gutierrez Santos MD   tiotropium (SPIRIVA HANDIHALER) 18 MCG inhalation capsule Inhale 1 capsule into the lungs daily 6/4/18   Yury Grimaldo MD   dicyclomine (BENTYL) 20 MG tablet Take 1 tablet by mouth 3 times daily as needed (cramping) 6/4/18   Yury Grimaldo MD   albuterol sulfate HFA (PROAIR HFA) 108 (90 Base) MCG/ACT inhaler Inhale 2 puffs into the lungs every 6 hours as needed for Wheezing 6/4/18   Yury Grimaldo MD   nicotine (NICODERM CQ) 14 MG/24HR Place 1 patch onto the skin every 24 hours 6/4/18 6/4/19  Yury Grimaldo MD   metFORMIN (GLUCOPHAGE) 500 MG tablet Take 1 tablet by mouth 2 times daily (with meals) 6/4/18   Yury Grimaldo MD   TRINTELLIX 10 MG TABS tablet Take 1 tablet by mouth daily 7/10/17   Historical Provider, MD   amphetamine-dextroamphetamine (ADDERALL XR) 15 MG extended release capsule take 1 capsule by mouth every morning 5/2/17   Historical Provider, MD   busPIRone (BUSPAR) 30 MG tablet Take 1 tablet by mouth 2 times daily 9/12/16   Historical Provider, MD   QUEtiapine (SEROQUEL XR) 300 MG XR tablet Take 1 tablet by mouth nightly 6/10/16   DO Dain Navarro MISC Use__1_times daily    Diagnisis:250.0  Diabetes Mellitus____Insulin Dependent_x__Non-Insulin Dependent 5/10/16   Yury Grimaldo MD   Glucose Blood (BLOOD GLUCOSE TEST STRIPS) STRP Test__1_times daily    Diagnosis: 250.0   Diabetes Mellitus____Insulin Dependent___x_Non-Insulin Dependent 5/10/16   Yury Grimaldo MD       REVIEW OF SYSTEMS    (2-9 systems for level 4, 10 or more for level 5)      Review of Systems   Constitutional: Negative for appetite change, chills, diaphoresis, fatigue and fever. HENT: Negative for congestion, rhinorrhea, sinus pain and sore throat. Eyes: Negative for pain and visual disturbance. Respiratory: Negative for cough, chest tightness, shortness of breath and wheezing. Cardiovascular: Negative for chest pain, palpitations and leg swelling.    Gastrointestinal: Negative for abdominal distention, abdominal pain, diarrhea, nausea and vomiting. Endocrine: Negative for heat intolerance, polydipsia and polyphagia. Genitourinary: Negative for difficulty urinating, dysuria, flank pain, hematuria and urgency. Musculoskeletal: Positive for arthralgias and joint swelling. Skin: Negative for pallor and rash. Neurological: Negative for dizziness, seizures, syncope, weakness, numbness and headaches. History of diabetic neuropathy of feet       PHYSICAL EXAM   (up to 7 for level 4, 8 or more for level 5)      INITIAL VITALS:   ED Triage Vitals [07/27/18 1432]   BP Temp Temp Source Pulse Resp SpO2 Height Weight   122/89 98.7 °F (37.1 °C) Oral 95 17 97 % 5' 3\" (1.6 m) 225 lb (102.1 kg)       Physical Exam   Constitutional: She is oriented to person, place, and time. She appears well-developed and well-nourished. No distress. HENT:   Head: Atraumatic. Mouth/Throat: No oropharyngeal exudate. Eyes: Conjunctivae and EOM are normal. Pupils are equal, round, and reactive to light. Right eye exhibits no discharge. Left eye exhibits no discharge. Neck: Normal range of motion. Cardiovascular: Normal rate and regular rhythm. Exam reveals no friction rub. Pulmonary/Chest: Effort normal and breath sounds normal. No respiratory distress. She has no wheezes. She has no rales. She exhibits no tenderness. Abdominal: Soft. Bowel sounds are normal. She exhibits no distension. Musculoskeletal:   There is no swelling or gross deformity of the right ankle is a small abrasion on the lateral malleolar skin the right ankle. There is no point tenderness to the posterior malleoli right ankle, nor is there pain to the base of the fifth or any specific midfoot bones. Patient is not able to put weight on heel, however, she is able to put weight on the right toes.   There is bruising over the right Achilles tendon, patient is able to plantarflex pain, Ocampo's test is negative for complete Achilles rupture as there is movement with squeezing of the gastroc. Assessment mild pain to palpation of patellar tendon of the right knee. There is no pain to palpation of the patella, patient is able to flex knee, there is no pain to tibial plateaus or or popliteal fossa. Patient is neurovascularly intact. Neurological: She is alert and oriented to person, place, and time. No cranial nerve deficit. She exhibits normal muscle tone. Skin: Skin is warm. No rash noted. She is not diaphoretic. No erythema. Psychiatric: She has a normal mood and affect. Her behavior is normal.       DIFFERENTIAL  DIAGNOSIS     PLAN (LABS / IMAGING / EKG):  Orders Placed This Encounter   Procedures    XR ANKLE RIGHT (MIN 3 VIEWS)       MEDICATIONS ORDERED:  Orders Placed This Encounter   Medications    HYDROcodone-acetaminophen (NORCO) 5-325 MG per tablet 1 tablet    oxyCODONE-acetaminophen (PERCOCET) 5-325 MG per tablet     Sig: Take 1 tablet by mouth every 8 hours as needed for Pain for up to 1 day. .     Dispense:  5 tablet     Refill:  0       DDX: Partial Achilles tear, sprain ankle ligaments, calcaneal    Initial MDM/Plan: 41 y.o. female who presents with right ankle, heel, Achilles tendon pain after 2 separate falls in the last 2 days characterized by inversion injury during football. We'll plan to x-ray ankle for reason of heel pain and place patient in boot and follow-up with orthopedics for possible partial Achilles tendon rupture.         DIAGNOSTIC RESULTS / EMERGENCY DEPARTMENT COURSE / MDM     LABS:  Labs Reviewed - No data to display      RADIOLOGY:  Xr Ankle Right (min 3 Views)    Result Date: 7/27/2018  EXAMINATION: 3 XRAY VIEWS OF THE RIGHT ANKLE 7/27/2018 4:34 pm COMPARISON: Radiographs from December 22, 2009 HISTORY: ORDERING SYSTEM PROVIDED HISTORY: heel pain and medial malleolar pain TECHNOLOGIST PROVIDED HISTORY: Reason for exam:->heel pain and medial malleolar pain FINDINGS: There are a few corticated osseous bodies in the medial clear space, largest measuring 0.5 cm. These were faintly present on study dated December 22, 2009. The alignment of the ankle is maintained. No significant joint space narrowing. Enthesophytes of the calcaneus at the plantar fascia origin. 1. Calcifications in the medial clear space may represent sequela of prior deltoid ligament injury or may relate to degenerative change with intra-articular bodies. 2. Calcaneal enthesopathy. EMERGENCY DEPARTMENT COURSE:    Feli Knights test confirmed the presence of orthopedic resident. X-rays ordered of right ankle. No fracture seen on x-ray posterior splint applied equinus to right lower leg. Patient given contact information for specialty clinic instruction to follow up. 5 pills of Percocet given for pain control of the weekend. PROCEDURES:  None    CONSULTS:  None    CRITICAL CARE:  Please see attending note    FINAL IMPRESSION      1. Achilles tendon injury, right, initial encounter          DISPOSITION / PLAN     DISPOSITION        PATIENT REFERRED TO:  [address] [place] [address]  [phone]  Call today        DISCHARGE MEDICATIONS:  Discharge Medication List as of 7/27/2018  5:50 PM      START taking these medications    Details   oxyCODONE-acetaminophen (PERCOCET) 5-325 MG per tablet Take 1 tablet by mouth every 8 hours as needed for Pain for up to 1 day. ., Disp-5 tablet, R-0Print             Allison Steel MD  Emergency Medicine Resident    (Please note that portions of this note were completed with a voice recognition program.  Efforts were made to edit the dictations but occasionally words are mis-transcribed.)       Allison Steel MD  Resident  07/27/18 0066

## 2018-08-11 ENCOUNTER — APPOINTMENT (OUTPATIENT)
Dept: GENERAL RADIOLOGY | Age: 43
End: 2018-08-11
Payer: MEDICARE

## 2018-08-11 ENCOUNTER — HOSPITAL ENCOUNTER (EMERGENCY)
Age: 43
Discharge: HOME | End: 2018-08-11
Attending: EMERGENCY MEDICINE
Payer: MEDICARE

## 2018-08-11 VITALS
TEMPERATURE: 98.4 F | OXYGEN SATURATION: 98 % | HEART RATE: 98 BPM | HEIGHT: 63 IN | SYSTOLIC BLOOD PRESSURE: 112 MMHG | BODY MASS INDEX: 35.44 KG/M2 | WEIGHT: 200 LBS | RESPIRATION RATE: 24 BRPM | DIASTOLIC BLOOD PRESSURE: 82 MMHG

## 2018-08-11 DIAGNOSIS — F41.9 ANXIETY: Primary | ICD-10-CM

## 2018-08-11 DIAGNOSIS — M54.50 ACUTE BILATERAL LOW BACK PAIN WITHOUT SCIATICA: ICD-10-CM

## 2018-08-11 LAB
EKG ATRIAL RATE: 109 BPM
EKG P AXIS: 38 DEGREES
EKG P-R INTERVAL: 142 MS
EKG Q-T INTERVAL: 336 MS
EKG QRS DURATION: 66 MS
EKG QTC CALCULATION (BAZETT): 452 MS
EKG R AXIS: 14 DEGREES
EKG T AXIS: 29 DEGREES
EKG VENTRICULAR RATE: 109 BPM

## 2018-08-11 PROCEDURE — 72072 X-RAY EXAM THORAC SPINE 3VWS: CPT

## 2018-08-11 PROCEDURE — 84703 CHORIONIC GONADOTROPIN ASSAY: CPT

## 2018-08-11 PROCEDURE — 72100 X-RAY EXAM L-S SPINE 2/3 VWS: CPT

## 2018-08-11 PROCEDURE — 99284 EMERGENCY DEPT VISIT MOD MDM: CPT

## 2018-08-11 PROCEDURE — 93005 ELECTROCARDIOGRAM TRACING: CPT

## 2018-08-11 PROCEDURE — 6370000000 HC RX 637 (ALT 250 FOR IP): Performed by: EMERGENCY MEDICINE

## 2018-08-11 RX ORDER — DIAZEPAM 5 MG/1
5 TABLET ORAL ONCE
Status: COMPLETED | OUTPATIENT
Start: 2018-08-11 | End: 2018-08-11

## 2018-08-11 RX ORDER — DIAZEPAM 5 MG/1
5 TABLET ORAL EVERY 8 HOURS PRN
Qty: 10 TABLET | Refills: 0 | Status: SHIPPED | OUTPATIENT
Start: 2018-08-11 | End: 2018-08-21

## 2018-08-11 RX ORDER — ACETAMINOPHEN 325 MG/1
650 TABLET ORAL ONCE
Status: COMPLETED | OUTPATIENT
Start: 2018-08-11 | End: 2018-08-11

## 2018-08-11 RX ADMIN — DIAZEPAM 5 MG: 5 TABLET ORAL at 03:44

## 2018-08-11 RX ADMIN — ACETAMINOPHEN 650 MG: 325 TABLET ORAL at 03:44

## 2018-08-11 ASSESSMENT — PAIN DESCRIPTION - LOCATION: LOCATION: BACK

## 2018-08-11 ASSESSMENT — ENCOUNTER SYMPTOMS
DIARRHEA: 0
COUGH: 0
PHOTOPHOBIA: 0
SHORTNESS OF BREATH: 1
VOMITING: 0
NAUSEA: 0
CONSTIPATION: 0
BACK PAIN: 1
COLOR CHANGE: 0

## 2018-08-11 ASSESSMENT — PAIN DESCRIPTION - PAIN TYPE: TYPE: ACUTE PAIN

## 2018-08-11 ASSESSMENT — PAIN SCALES - GENERAL
PAINLEVEL_OUTOF10: 8
PAINLEVEL_OUTOF10: 8

## 2018-08-11 ASSESSMENT — PAIN DESCRIPTION - ORIENTATION: ORIENTATION: MID

## 2018-08-11 NOTE — ED PROVIDER NOTES
[facility]  ED  Emergency Department Encounter  Emergency Medicine Resident Note     Pt Name: Grayson Uriarte  MRN: [id number]  Chikisgfpraneeth 1975  Date of evaluation: 2018  PCP:  Daysi Tenorio MD    [address] [place] [address]       Chief Complaint   Patient presents with    Anxiety     sudden onset while watching movie, anxious, shortness of breath- used home inhaler, back pain-cant sit still       HISTORY OF PRESENT ILLNESS  (Location/Symptom, Timing/Onset, Context/Setting, Quality, Duration, Modifying Factors, Severity.)      Grayson Uriarte is a 41 y.o. female who presents with Back pain following on stairs. Patient states that she was watching A movie and then got up and slipped on a toy and fell down the stairs. She states that she struck her back down multiple stairs during her fall. She did not strike her head had no loss of consciousness. She is complaining of lower thoracic and lumbar pain. Denies any neck pain. Denies any numbness, T1, or weakness in her arms or legs. She states after this happened she did also become anxious and became short of breath. She is her inhaler at home. She does have a history of anxiety in the past.  She's had no bowel or bladder incontinence. PAST MEDICAL / SURGICAL / SOCIAL / FAMILY HISTORY     Past Medical History:  has a past medical history of Alcohol abuse; Anxiety; Arthritis; Asthma; Back pain, chronic; Cerebral artery occlusion with cerebral infarction ([address] Colitis; Depression; Diarrhea; Diarrhea; Drug abuse; Fibromyalgia; Full dentures; Full dentures; H/O degenerative disc disease; Hypertension; Lipoma; Lump of breast, left; Migraine; Mixed hyperlipidemia; Numbness; Snores; Type 2 diabetes mellitus without complication ([address] and Wears glasses. Past Surgical History:  has a past surgical history that includes  section; Spinal fixation surgery with implant; orthopedic surgery (Bilateral);  Upper gastrointestinal endoscopy (12-29-15); other surgical history (Left, 07/07/2016); Nerve Block (Right, 11/14/2016); Nerve Block (Right, 12/02/2016); Nerve Block (Right, 01/20/2017); Nerve Block (Right, 04/05/2017); Nerve Block (Left, 08/11/2017); lipoma resection (Left, 01/15/2018); and pr exc skin benig 0.6-1cm face,facial (Left, 1/15/2018). Allergies:  Dye [barium-containing compounds]; Nsaids; Demerol; Pcn [penicillins]; and Tramadol     Home Meds:   Prior to Visit Medications    Medication Sig Taking? Authorizing Provider   diazepam (VALIUM) 5 MG tablet Take 1 tablet by mouth every 8 hours as needed for Anxiety for up to 10 days. Nevin Queen MD   cyclobenzaprine (FLEXERIL) 5 MG tablet Take 1 tablet by mouth 3 times daily as needed for Muscle spasms Yes Magdalena Quintana MD   amLODIPine (NORVASC) 10 MG tablet Take 1 tablet by mouth daily Yes Antonia Perez MD   tiotropium (Boy Neal) 18 MCG inhalation capsule Inhale 1 capsule into the lungs daily Yes Antonia Perez MD   albuterol sulfate HFA (PROAIR HFA) 108 (90 Base) MCG/ACT inhaler Inhale 2 puffs into the lungs every 6 hours as needed for Wheezing Yes Antonia Perez MD   amphetamine-dextroamphetamine (ADDERALL XR) 15 MG extended release capsule take 1 capsule by mouth every morning Yes Historical Provider, MD   busPIRone (BUSPAR) 30 MG tablet Take 1 tablet by mouth 2 times daily Yes Historical Provider, MD   QUEtiapine (SEROQUEL XR) 300 MG XR tablet Take 1 tablet by mouth nightly Yes Jose Verdin DO   gabapentin (NEURONTIN) 400 MG capsule Take 1 capsule by mouth 3 times daily for 30 days. . Will cause drowsiness. .  Magdalena Quintana MD   acetaminophen (TYLENOL) 325 MG tablet Take 2 tablets by mouth every 6 hours as needed for Pain  Antonia Perez MD   dicyclomine (BENTYL) 20 MG tablet Take 1 tablet by mouth 3 times daily as needed (cramping)  Antonia Perez MD   nicotine (NICODERM CQ) 14 MG/24HR Place 1 patch onto the skin every 24 hours

## 2018-08-11 NOTE — ED NOTES
Bed: 30  Expected date: 8/11/18  Expected time:   Means of arrival:   Comments:  Tien Sol RN  08/11/18 2234

## 2018-08-11 NOTE — ED PROVIDER NOTES
[facility]     Emergency Department     Faculty Attestation    I performed a history and physical examination of the patient and discussed management with the resident. I reviewed the residents note and agree with the documented findings including all diagnostic interpretations and plan of care. Any areas of disagreement are noted on the chart. I was personally present for the key portions of any procedures. I have documented in the chart those procedures where I was not present during the key portions. I have reviewed the emergency nurses triage note. I agree with the chief complaint, past medical history, past surgical history, allergies, medications, social and family history as documented unless otherwise noted below. Documentation of the HPI, Physical Exam and Medical Decision Making performed by scribmargaret is based on my personal performance of the HPI, PE and MDM. For Physician Assistant/ Nurse Practitioner cases/documentation I have personally evaluated this patient and have completed at least one if not all key elements of the E/M (history, physical exam, and MDM). Additional findings are as noted. Primary Care Physician: Liliana Branch MD    History: This is a 41 y.o. female who presents to the Emergency Department with complaint of fall, anxiety. Patient tripped down several steps landing on her back. Reports having panic attack during this. No chest pain or shortness of breath. Physical:     height is 5' 3\" (1.6 m) and weight is 200 lb (90.7 kg). Her oral temperature is 98.4 °F (36.9 °C). Her blood pressure is 112/82 and her pulse is 98. Her respiration is 24 and oxygen saturation is 98%. 41 y.o. female no acute distress, but does appear uncomfortable. Tenderness palpation over the paraspinal lumbar and thoracic spine. No cervical tenderness.   No raccoon eyes or Shaver sign or hemotympanum no tenderness palpation of the

## 2018-08-13 LAB — HCG, PREGNANCY URINE (POC): NEGATIVE

## 2018-08-16 ENCOUNTER — HOSPITAL ENCOUNTER (EMERGENCY)
Age: 43
Discharge: PSYCHIATRIC OR SUBSTANCE USE FACILITY | End: 2018-08-17
Attending: EMERGENCY MEDICINE
Payer: MEDICARE

## 2018-08-16 VITALS
OXYGEN SATURATION: 100 % | TEMPERATURE: 98.7 F | WEIGHT: 210 LBS | SYSTOLIC BLOOD PRESSURE: 136 MMHG | HEART RATE: 96 BPM | BODY MASS INDEX: 37.2 KG/M2 | DIASTOLIC BLOOD PRESSURE: 79 MMHG | RESPIRATION RATE: 18 BRPM

## 2018-08-16 DIAGNOSIS — F32.A DEPRESSION, UNSPECIFIED DEPRESSION TYPE: ICD-10-CM

## 2018-08-16 PROCEDURE — 99284 EMERGENCY DEPT VISIT MOD MDM: CPT

## 2018-08-16 RX ORDER — ESCITALOPRAM OXALATE 5 MG/1
5 TABLET ORAL DAILY
Status: ON HOLD | COMMUNITY
End: 2018-08-20

## 2018-08-17 ENCOUNTER — HOSPITAL ENCOUNTER (INPATIENT)
Age: 43
LOS: 3 days | Discharge: HOME | DRG: 881 | End: 2018-08-20
Attending: PSYCHIATRY & NEUROLOGY | Admitting: PSYCHIATRY & NEUROLOGY
Payer: MEDICARE

## 2018-08-17 PROBLEM — F32.A DEPRESSED MOOD WITH FEELING OF LONELINESS: Status: ACTIVE | Noted: 2018-08-17

## 2018-08-17 PROBLEM — F31.9 BIPOLAR DISORDER (HCC): Status: ACTIVE | Noted: 2018-08-17

## 2018-08-17 LAB
CHP ED QC CHECK: YES
GLUCOSE BLD-MCNC: 124 MG/DL
GLUCOSE BLD-MCNC: 124 MG/DL (ref 65–105)
GLUCOSE BLD-MCNC: 155 MG/DL (ref 65–105)

## 2018-08-17 PROCEDURE — 1240000000 HC EMOTIONAL WELLNESS R&B

## 2018-08-17 PROCEDURE — 6370000000 HC RX 637 (ALT 250 FOR IP): Performed by: PSYCHIATRY & NEUROLOGY

## 2018-08-17 PROCEDURE — 6370000000 HC RX 637 (ALT 250 FOR IP): Performed by: EMERGENCY MEDICINE

## 2018-08-17 PROCEDURE — 82947 ASSAY GLUCOSE BLOOD QUANT: CPT

## 2018-08-17 RX ORDER — ESCITALOPRAM OXALATE 10 MG/1
10 TABLET ORAL DAILY
Status: DISCONTINUED | OUTPATIENT
Start: 2018-08-17 | End: 2018-08-20 | Stop reason: HOSPADM

## 2018-08-17 RX ORDER — QUETIAPINE 300 MG/1
300 TABLET, FILM COATED, EXTENDED RELEASE ORAL ONCE
Status: COMPLETED | OUTPATIENT
Start: 2018-08-17 | End: 2018-08-17

## 2018-08-17 RX ORDER — MAGNESIUM HYDROXIDE/ALUMINUM HYDROXICE/SIMETHICONE 120; 1200; 1200 MG/30ML; MG/30ML; MG/30ML
20 SUSPENSION ORAL 3 TIMES DAILY PRN
Status: DISCONTINUED | OUTPATIENT
Start: 2018-08-17 | End: 2018-08-20 | Stop reason: HOSPADM

## 2018-08-17 RX ORDER — ALBUTEROL SULFATE 90 UG/1
2 AEROSOL, METERED RESPIRATORY (INHALATION) EVERY 6 HOURS PRN
Status: DISCONTINUED | OUTPATIENT
Start: 2018-08-17 | End: 2018-08-20 | Stop reason: HOSPADM

## 2018-08-17 RX ORDER — HYDROXYZINE HYDROCHLORIDE 25 MG/1
25 TABLET, FILM COATED ORAL 3 TIMES DAILY PRN
Status: DISCONTINUED | OUTPATIENT
Start: 2018-08-17 | End: 2018-08-20 | Stop reason: HOSPADM

## 2018-08-17 RX ORDER — GINSENG 100 MG
CAPSULE ORAL ONCE
Status: COMPLETED | OUTPATIENT
Start: 2018-08-17 | End: 2018-08-17

## 2018-08-17 RX ORDER — DICYCLOMINE HCL 20 MG
20 TABLET ORAL 3 TIMES DAILY PRN
Status: DISCONTINUED | OUTPATIENT
Start: 2018-08-17 | End: 2018-08-20 | Stop reason: HOSPADM

## 2018-08-17 RX ORDER — BENZTROPINE MESYLATE 1 MG/ML
2 INJECTION INTRAMUSCULAR; INTRAVENOUS DAILY PRN
Status: DISCONTINUED | OUTPATIENT
Start: 2018-08-17 | End: 2018-08-20 | Stop reason: HOSPADM

## 2018-08-17 RX ORDER — NICOTINE 21 MG/24HR
1 PATCH, TRANSDERMAL 24 HOURS TRANSDERMAL EVERY 24 HOURS
Status: DISCONTINUED | OUTPATIENT
Start: 2018-08-17 | End: 2018-08-18

## 2018-08-17 RX ORDER — BUSPIRONE HYDROCHLORIDE 15 MG/1
30 TABLET ORAL 2 TIMES DAILY
Status: DISCONTINUED | OUTPATIENT
Start: 2018-08-17 | End: 2018-08-20 | Stop reason: HOSPADM

## 2018-08-17 RX ORDER — ACETAMINOPHEN 325 MG/1
650 TABLET ORAL ONCE
Status: COMPLETED | OUTPATIENT
Start: 2018-08-17 | End: 2018-08-17

## 2018-08-17 RX ORDER — BUSPIRONE HYDROCHLORIDE 15 MG/1
30 TABLET ORAL ONCE
Status: COMPLETED | OUTPATIENT
Start: 2018-08-17 | End: 2018-08-17

## 2018-08-17 RX ORDER — AMLODIPINE BESYLATE 10 MG/1
10 TABLET ORAL DAILY
Status: DISCONTINUED | OUTPATIENT
Start: 2018-08-17 | End: 2018-08-20 | Stop reason: HOSPADM

## 2018-08-17 RX ORDER — DIAZEPAM 5 MG/1
5 TABLET ORAL EVERY 8 HOURS PRN
Status: DISCONTINUED | OUTPATIENT
Start: 2018-08-17 | End: 2018-08-20 | Stop reason: HOSPADM

## 2018-08-17 RX ORDER — QUETIAPINE 300 MG/1
300 TABLET, FILM COATED, EXTENDED RELEASE ORAL NIGHTLY
Status: DISCONTINUED | OUTPATIENT
Start: 2018-08-17 | End: 2018-08-20 | Stop reason: HOSPADM

## 2018-08-17 RX ORDER — TRAZODONE HYDROCHLORIDE 50 MG/1
50 TABLET ORAL NIGHTLY PRN
Status: DISCONTINUED | OUTPATIENT
Start: 2018-08-17 | End: 2018-08-20 | Stop reason: HOSPADM

## 2018-08-17 RX ORDER — ACETAMINOPHEN 325 MG/1
650 TABLET ORAL EVERY 4 HOURS PRN
Status: DISCONTINUED | OUTPATIENT
Start: 2018-08-17 | End: 2018-08-20 | Stop reason: HOSPADM

## 2018-08-17 RX ORDER — DEXTROAMPHETAMINE SACCHARATE, AMPHETAMINE ASPARTATE MONOHYDRATE, DEXTROAMPHETAMINE SULFATE AND AMPHETAMINE SULFATE 2.5; 2.5; 2.5; 2.5 MG/1; MG/1; MG/1; MG/1
20 CAPSULE, EXTENDED RELEASE ORAL DAILY
Status: DISCONTINUED | OUTPATIENT
Start: 2018-08-18 | End: 2018-08-20 | Stop reason: HOSPADM

## 2018-08-17 RX ORDER — GABAPENTIN 400 MG/1
400 CAPSULE ORAL 3 TIMES DAILY
Status: DISCONTINUED | OUTPATIENT
Start: 2018-08-17 | End: 2018-08-20 | Stop reason: HOSPADM

## 2018-08-17 RX ORDER — CYCLOBENZAPRINE HCL 10 MG
5 TABLET ORAL 3 TIMES DAILY PRN
Status: DISCONTINUED | OUTPATIENT
Start: 2018-08-17 | End: 2018-08-20 | Stop reason: HOSPADM

## 2018-08-17 RX ADMIN — DIAZEPAM 5 MG: 5 TABLET ORAL at 18:22

## 2018-08-17 RX ADMIN — GABAPENTIN 400 MG: 400 CAPSULE ORAL at 21:09

## 2018-08-17 RX ADMIN — CYCLOBENZAPRINE HYDROCHLORIDE 5 MG: 10 TABLET, FILM COATED ORAL at 21:09

## 2018-08-17 RX ADMIN — BACITRACIN: 500 OINTMENT TOPICAL at 08:05

## 2018-08-17 RX ADMIN — BUSPIRONE HYDROCHLORIDE 30 MG: 15 TABLET ORAL at 01:31

## 2018-08-17 RX ADMIN — ACETAMINOPHEN 650 MG: 325 TABLET ORAL at 08:05

## 2018-08-17 RX ADMIN — METFORMIN HYDROCHLORIDE 500 MG: 500 TABLET ORAL at 18:22

## 2018-08-17 RX ADMIN — HYDROXYZINE HYDROCHLORIDE 25 MG: 25 TABLET, FILM COATED ORAL at 21:09

## 2018-08-17 RX ADMIN — QUETIAPINE FUMARATE 300 MG: 300 TABLET, EXTENDED RELEASE ORAL at 21:09

## 2018-08-17 RX ADMIN — QUETIAPINE FUMARATE 300 MG: 300 TABLET, EXTENDED RELEASE ORAL at 01:31

## 2018-08-17 ASSESSMENT — ENCOUNTER SYMPTOMS
VOMITING: 0
WHEEZING: 0
PHOTOPHOBIA: 0
NAUSEA: 0
ABDOMINAL PAIN: 0
SHORTNESS OF BREATH: 0
DIARRHEA: 0
COLOR CHANGE: 0
BACK PAIN: 1

## 2018-08-17 ASSESSMENT — PAIN DESCRIPTION - ORIENTATION: ORIENTATION: MID

## 2018-08-17 ASSESSMENT — PAIN DESCRIPTION - LOCATION: LOCATION: BACK

## 2018-08-17 ASSESSMENT — PAIN DESCRIPTION - PAIN TYPE: TYPE: CHRONIC PAIN

## 2018-08-17 ASSESSMENT — PAIN SCALES - GENERAL
PAINLEVEL_OUTOF10: 0
PAINLEVEL_OUTOF10: 7

## 2018-08-17 NOTE — ED NOTES
belongings search:     Persons present during search:   Results of search and disposition:       Searchers Name: security    These items or items similar should be removed from the room:   [] Chairs   [] Telephone   [x] Trash cans and liners   [] Plastic utensils (order Patient Safety tray)   [x] Empty or remove Sharps containers   [] All personal clothing/belongings removed   [x] All unnecessary lead wires, electrical cords, draw cords, etc.   [x] Flowers and plants   [x] Double check for lighters, matches, razors, any glass items etc that can be used as weapons. Person completing Checklist: Lieutenant Russell       GENERAL INFORMATION     Y  N     [x] [] Has the patient been informed that they are on a watch and what that means? [x] [] Can the patient get out of Bed without nursing assistance? [x] [] Can the patient use the restroom without nursing assistance? [x] [] Can the patient walk the halls to [place] their legs? \"   [] [x] Does the patient have metal utensils? [x] [] Have the patient's belongings been placed out of control of the patient? [x] [] Have the patient and his/her belongings been checked for contraband? [] [x] Is the patient under any visitor restrictions? If Yes, explain:   [] [x] Is the patient under an alias? [facility] Name:   Authorized visitors (no more than two are to be on the list)   Name/Relationship:   Name/Relationship:    Name of Staff member that you  Received this information from?:   General Description:    Alisa Godinez female 41 y.o. Admission weight: 210 lb (95.3 kg)    Race: [x]  [] Black  []   []   [] [place] [] Other  Facial Hair:  [] Yes  [] No  If yes, please describe: Identifying Marks (i.e. Visible tattoos, scars, etc... ):     NURSING CARE PLAN    Nursing Diagnosis: Risk of Self Directed Harm  [] Actual  [x] Potential  Date Started: 8/17/18      Etiological Factors: (related to)  [] Expressed or implied suicidal ideation/behavior  [x] Depression  [] Suicide attempt      [x] Low self-esteem  [] Hallucinations      [x] Feeling of Hopelessness  [] Substance abuse or withdrawal    [] Dysfunctional family  [x] Major traumatic event, eg., divorce, etc   [x] Excessive stress/anxiety    8/17/18    Expected Outcomes    Patient will:   [x] Patient will remain safe for the duration of their stay   [x] Patient's environment will be safe, eg. Free of potential suicide weapons   [] Verbalize Recovery from suicidal episode and improvement in self-worth   [x] Discuss feeling that precipitated suicide attempt/thoughts/behavior   [] Will describe available resources for crisis prevention and management   [] Will verbalize positive coping skills     Nursing Intervention   [x] Assessment and Observations hourly   [x] Suicide Precautions implemented with patient, should be 1:1 observation   [x] Document observation [id number] and RN assessment hourly   [] Consult physician for:    [] Psychiatric consult    [] Pharmacological therapy    [] Other:    [x] Patient search completed by security   [x] Initiated appropriate safety protocols by removing from the patient's environment anything that could be used to inflict self injury, eg. Order safe tray, snap gown, etc   [x] Maintain open, warm, caring, non-judgmental attitude/manner towards patient   [] Discuss advantages and disadvantages of existing coping methods/skills   [x] Assist and educate patient with identifying present strengths and coping skills   [x] Keep patient informed regarding plan of care and provide clear concise explanations. Provide the patient/family education information as well as telephone numbers and other information about crisis centers, hot lines, and counselors.     Discharge Planning:   [] Referral  [] Groups [] Health agencies  [] Other:          Lucie Sun RN  08/17/18 3627
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
## 2018-08-17 NOTE — ED NOTES
Pt on cell phone in [facility], no distress, cooperative and pleasant. Pt provided box lunch, rr even and unlabored, one on one watch in place, awaiting transfer.       Alessandro Brown RN  08/17/18 9748

## 2018-08-17 NOTE — BH NOTE
08/17/2018               Start Time:    1600                     End Time: 1630     Number Participants in Group: 5/19    Topic : Wellness Group           RT   SW   Nsg   LPN    BHTII  X LPC         Participation Level:                   None   Minimal   x Active Listener x Interactive     Monopolizing             Participation Quality:  x Appropriate   Inappropriate   x  Attentive    Intrusive   x  Sharing    Resistant   x  Supportive     Lethargic         Affective:          x Congruent   Incongruent   Blunted   Flat     Constricted   Anxious   Elated   Angry     Labile   Depressed   Other             Cognitive:  x Alert x Oriented PPTS      Concentration G   F x P     Attention Span G   F x P     Short-Term Memory G   F x P     Long-Term Memory G   F x P     Problem Solving/  Decision Making G   F x P     Ability to Process  Information G   F x P          Contributing Factors              Delusional              Hallucinating              Flight of Ideas              Other: poor concentration         Modes of Intervention:    Education x Support x Exploration   x Clarifying x Problem Solving   Confrontation     Socialization   Limit Setting   Reality Testing     Activity   Movement   Media     Other:               Response to Learning:  x Able to verbalize current knowledge/experience   x Able to verbalize/acknowledge new learning   x Able to retain information   x Capable of insight   x Able to change behavior   x Progressing to goal     Other: intrusive and monopolizing         Comments:  participated in group

## 2018-08-17 NOTE — ED PROVIDER NOTES
Dr Moy Drummond sign out, suicidal ideation, transfer to psych     Jazmine Del Rosario,   08/17/18 9520
7/27/2018  EXAMINATION: 3 XRAY VIEWS OF THE RIGHT ANKLE 7/27/2018 4:34 pm COMPARISON: Radiographs from December 22, 2009 HISTORY: ORDERING SYSTEM PROVIDED HISTORY: heel pain and medial malleolar pain TECHNOLOGIST PROVIDED HISTORY: Reason for exam:->heel pain and medial malleolar pain FINDINGS: There are a few corticated osseous bodies in the medial clear space, largest measuring 0.5 cm. These were faintly present on study dated December 22, 2009. The alignment of the ankle is maintained. No significant joint space narrowing. Enthesophytes of the calcaneus at the plantar fascia origin. 1. Calcifications in the medial clear space may represent sequela of prior deltoid ligament injury or may relate to degenerative change with intra-articular bodies. 2. Calcaneal enthesopathy. RECENT VITALS:     Temp: 98.7 °F (37.1 °C),  Pulse: 96 (By auscultation), Resp: 18, BP: 136/79, SpO2: 100 %    This patient is a 41 y.o. Female with depression & anxiety. Self inflicted injury d/t anxiety. No plans for suicide or SI. Accepted to [facility]. OUTSTANDING TASKS / RECOMMENDATIONS:    1. Transfer pending. FINAL IMPRESSION:     1. Self-inflicted injury    2.  Depression, unspecified depression type        DISPOSITION:         DISPOSITION:  []  Discharge   [x]  Transfer -    []  Admission -     []  Against Medical Advice   []  Eloped   FOLLOW-UP: Arely Jiménez Proc. Derrick Marcos [address] [phone]           DISCHARGE MEDICATIONS: New Prescriptions    No medications on file          Luís Barnes MD  Emergency Medicine Resident  [address] Luís Barnes MD  08/17/18 6803
DIAGNOSIS/IMPRESSION     DDX: Self injury, depression    Impression: 41 y.o. female who presents with multiple excoriations to the wrist due to self injury. Patient is very tearful and distraught due to an emotional situation. She's been having depression. She did state that she fell on the stairs, but initially to me explaining that there was no loss of consciousness in her head and had no new pain. She was seen recently and had negative x-rays of her spine. Patient has no active suicidal ideations or homicidal ideations, but given the patient does not typically come in with request for psychiatric admission, we will talk to  evaluation. She is medically cleared at this time. DIAGNOSTIC RESULTS     EKG: All EKG's are interpreted by the Emergency Department Physician who either signs or Co-signs this chart in the absence of a cardiologist.    Not clinically indicated at this time. LABS: Labs were reviewed by me and abnormal results are displayed above     Labs Reviewed - No data to display    RADIOLOGY: All plain film, CT, MRI, and formal ultrasound images (except ED bedside ultrasound) are read by the radiologist, see reports below, unless otherwise noted in ED Course, MDM or here. No results found. BEDSIDE ULTRASOUND:  Not clinically indicated at this time.       ED COURSE      ED Medication Orders     Start Ordered     Status Ordering Provider    08/17/18 0900 08/17/18 0120  nicotine (NICODERM CQ) 7 MG/24HR 1 patch  DAILY      Last MAR action:  Patch Applied - by Wonda Needle on 08/17/18 at [facility] E    08/17/18 0730 08/17/18 0717  acetaminophen (TYLENOL) tablet 650 mg  ONCE      Ordered CANDY BURDEN E    08/17/18 0730 08/17/18 0717  bacitracin ointment  ONCE      Ordered CANDY BURDEN E    08/17/18 0015 08/17/18 0014  busPIRone (BUSPAR) tablet 30 mg  ONCE      Last MAR action:  Given - by Wonda Needle on 08/17/18 at [address] [facility], [place] [address] E    08/17/18

## 2018-08-18 LAB
ABSOLUTE EOS #: 0.2 K/UL (ref 0–0.4)
ABSOLUTE IMMATURE GRANULOCYTE: ABNORMAL K/UL (ref 0–0.3)
ABSOLUTE LYMPH #: 2 K/UL (ref 1–4.8)
ABSOLUTE MONO #: 0.4 K/UL (ref 0.1–1.3)
ANION GAP SERPL CALCULATED.3IONS-SCNC: 14 MMOL/L (ref 9–17)
BASOPHILS # BLD: 1 % (ref 0–2)
BASOPHILS ABSOLUTE: 0 K/UL (ref 0–0.2)
BUN BLDV-MCNC: 9 MG/DL (ref 6–20)
BUN/CREAT BLD: ABNORMAL (ref 9–20)
CALCIUM SERPL-MCNC: 9.3 MG/DL (ref 8.6–10.4)
CHLORIDE BLD-SCNC: 101 MMOL/L (ref 98–107)
CO2: 22 MMOL/L (ref 20–31)
CREAT SERPL-MCNC: 0.9 MG/DL (ref 0.5–0.9)
DIFFERENTIAL TYPE: ABNORMAL
EOSINOPHILS RELATIVE PERCENT: 2 % (ref 0–4)
GFR AFRICAN AMERICAN: >60 ML/MIN
GFR NON-AFRICAN AMERICAN: >60 ML/MIN
GFR SERPL CREATININE-BSD FRML MDRD: ABNORMAL ML/MIN/{1.73_M2}
GFR SERPL CREATININE-BSD FRML MDRD: ABNORMAL ML/MIN/{1.73_M2}
GLUCOSE BLD-MCNC: 143 MG/DL (ref 65–105)
GLUCOSE BLD-MCNC: 143 MG/DL (ref 65–105)
GLUCOSE BLD-MCNC: 167 MG/DL (ref 70–99)
HCT VFR BLD CALC: 37.6 % (ref 36–46)
HEMOGLOBIN: 12.1 G/DL (ref 12–16)
IMMATURE GRANULOCYTES: ABNORMAL %
LYMPHOCYTES # BLD: 30 % (ref 24–44)
MCH RBC QN AUTO: 26.5 PG (ref 26–34)
MCHC RBC AUTO-ENTMCNC: 32.2 G/DL (ref 31–37)
MCV RBC AUTO: 82 FL (ref 80–100)
MONOCYTES # BLD: 6 % (ref 1–7)
NRBC AUTOMATED: ABNORMAL PER 100 WBC
PDW BLD-RTO: 17.3 % (ref 11.5–14.9)
PLATELET # BLD: 237 K/UL (ref 150–450)
PLATELET ESTIMATE: ABNORMAL
PMV BLD AUTO: 8.1 FL (ref 6–12)
POTASSIUM SERPL-SCNC: 3.8 MMOL/L (ref 3.7–5.3)
RBC # BLD: 4.59 M/UL (ref 4–5.2)
RBC # BLD: ABNORMAL 10*6/UL
SEG NEUTROPHILS: 61 % (ref 36–66)
SEGMENTED NEUTROPHILS ABSOLUTE COUNT: 4.1 K/UL (ref 1.3–9.1)
SODIUM BLD-SCNC: 137 MMOL/L (ref 135–144)
WBC # BLD: 6.6 K/UL (ref 3.5–11)
WBC # BLD: ABNORMAL 10*3/UL

## 2018-08-18 PROCEDURE — 6370000000 HC RX 637 (ALT 250 FOR IP): Performed by: PSYCHIATRY & NEUROLOGY

## 2018-08-18 PROCEDURE — 1240000000 HC EMOTIONAL WELLNESS R&B

## 2018-08-18 PROCEDURE — 82947 ASSAY GLUCOSE BLOOD QUANT: CPT

## 2018-08-18 PROCEDURE — 80048 BASIC METABOLIC PNL TOTAL CA: CPT

## 2018-08-18 PROCEDURE — 85025 COMPLETE CBC W/AUTO DIFF WBC: CPT

## 2018-08-18 PROCEDURE — 36415 COLL VENOUS BLD VENIPUNCTURE: CPT

## 2018-08-18 RX ORDER — NICOTINE 21 MG/24HR
1 PATCH, TRANSDERMAL 24 HOURS TRANSDERMAL DAILY
Status: DISCONTINUED | OUTPATIENT
Start: 2018-08-18 | End: 2018-08-20 | Stop reason: HOSPADM

## 2018-08-18 RX ADMIN — GABAPENTIN 400 MG: 400 CAPSULE ORAL at 08:35

## 2018-08-18 RX ADMIN — DIAZEPAM 5 MG: 5 TABLET ORAL at 22:02

## 2018-08-18 RX ADMIN — CYCLOBENZAPRINE HYDROCHLORIDE 5 MG: 10 TABLET, FILM COATED ORAL at 08:35

## 2018-08-18 RX ADMIN — METFORMIN HYDROCHLORIDE 500 MG: 500 TABLET ORAL at 17:48

## 2018-08-18 RX ADMIN — BUSPIRONE HYDROCHLORIDE 30 MG: 15 TABLET ORAL at 17:48

## 2018-08-18 RX ADMIN — GABAPENTIN 400 MG: 400 CAPSULE ORAL at 20:54

## 2018-08-18 RX ADMIN — METFORMIN HYDROCHLORIDE 500 MG: 500 TABLET ORAL at 08:35

## 2018-08-18 RX ADMIN — CYCLOBENZAPRINE HYDROCHLORIDE 5 MG: 10 TABLET, FILM COATED ORAL at 22:02

## 2018-08-18 RX ADMIN — TIOTROPIUM BROMIDE 18 MCG: 18 CAPSULE ORAL; RESPIRATORY (INHALATION) at 08:34

## 2018-08-18 RX ADMIN — DEXTROAMPHETAMINE SACCHARATE, AMPHETAMINE ASPARTATE MONOHYDRATE, DEXTROAMPHETAMINE SULFATE, AND AMPHETAMINE SULFATE 20 MG: 2.5; 2.5; 2.5; 2.5 CAPSULE, EXTENDED RELEASE ORAL at 08:35

## 2018-08-18 RX ADMIN — ACETAMINOPHEN 650 MG: 325 TABLET, FILM COATED ORAL at 20:54

## 2018-08-18 RX ADMIN — HYDROXYZINE HYDROCHLORIDE 25 MG: 25 TABLET, FILM COATED ORAL at 20:54

## 2018-08-18 RX ADMIN — QUETIAPINE FUMARATE 300 MG: 300 TABLET, EXTENDED RELEASE ORAL at 20:54

## 2018-08-18 RX ADMIN — GABAPENTIN 400 MG: 400 CAPSULE ORAL at 14:30

## 2018-08-18 RX ADMIN — AMLODIPINE BESYLATE 10 MG: 10 TABLET ORAL at 08:35

## 2018-08-18 RX ADMIN — ESCITALOPRAM OXALATE 10 MG: 10 TABLET ORAL at 08:35

## 2018-08-18 RX ADMIN — DIAZEPAM 5 MG: 5 TABLET ORAL at 14:30

## 2018-08-18 RX ADMIN — CYCLOBENZAPRINE HYDROCHLORIDE 5 MG: 10 TABLET, FILM COATED ORAL at 14:30

## 2018-08-18 RX ADMIN — BUSPIRONE HYDROCHLORIDE 30 MG: 15 TABLET ORAL at 08:35

## 2018-08-18 ASSESSMENT — PAIN SCALES - GENERAL
PAINLEVEL_OUTOF10: 3
PAINLEVEL_OUTOF10: 5

## 2018-08-18 ASSESSMENT — SLEEP AND FATIGUE QUESTIONNAIRES
SLEEP PATTERN: NORMAL
DO YOU HAVE DIFFICULTY SLEEPING: NO
RESTFUL SLEEP: YES
DIFFICULTY FALLING ASLEEP: NO
DIFFICULTY STAYING ASLEEP: NO
DIFFICULTY ARISING: NO
AVERAGE NUMBER OF SLEEP HOURS: 8
DO YOU USE A SLEEP AID: YES

## 2018-08-18 ASSESSMENT — LIFESTYLE VARIABLES: HISTORY_ALCOHOL_USE: YES

## 2018-08-18 ASSESSMENT — PATIENT HEALTH QUESTIONNAIRE - PHQ9: SUM OF ALL RESPONSES TO PHQ QUESTIONS 1-9: 8
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
## 2018-08-19 PROCEDURE — 6370000000 HC RX 637 (ALT 250 FOR IP): Performed by: PSYCHIATRY & NEUROLOGY

## 2018-08-19 PROCEDURE — 1240000000 HC EMOTIONAL WELLNESS R&B

## 2018-08-19 RX ADMIN — BUSPIRONE HYDROCHLORIDE 30 MG: 15 TABLET ORAL at 17:52

## 2018-08-19 RX ADMIN — METFORMIN HYDROCHLORIDE 500 MG: 500 TABLET ORAL at 08:33

## 2018-08-19 RX ADMIN — GABAPENTIN 400 MG: 400 CAPSULE ORAL at 21:06

## 2018-08-19 RX ADMIN — CYCLOBENZAPRINE HYDROCHLORIDE 5 MG: 10 TABLET, FILM COATED ORAL at 13:04

## 2018-08-19 RX ADMIN — DIAZEPAM 5 MG: 5 TABLET ORAL at 21:06

## 2018-08-19 RX ADMIN — METFORMIN HYDROCHLORIDE 500 MG: 500 TABLET ORAL at 17:52

## 2018-08-19 RX ADMIN — TIOTROPIUM BROMIDE 18 MCG: 18 CAPSULE ORAL; RESPIRATORY (INHALATION) at 08:33

## 2018-08-19 RX ADMIN — BUSPIRONE HYDROCHLORIDE 30 MG: 15 TABLET ORAL at 08:33

## 2018-08-19 RX ADMIN — CYCLOBENZAPRINE HYDROCHLORIDE 5 MG: 10 TABLET, FILM COATED ORAL at 04:03

## 2018-08-19 RX ADMIN — HYDROXYZINE HYDROCHLORIDE 25 MG: 25 TABLET, FILM COATED ORAL at 21:07

## 2018-08-19 RX ADMIN — AMLODIPINE BESYLATE 10 MG: 10 TABLET ORAL at 08:35

## 2018-08-19 RX ADMIN — DEXTROAMPHETAMINE SACCHARATE, AMPHETAMINE ASPARTATE MONOHYDRATE, DEXTROAMPHETAMINE SULFATE, AND AMPHETAMINE SULFATE 20 MG: 2.5; 2.5; 2.5; 2.5 CAPSULE, EXTENDED RELEASE ORAL at 08:33

## 2018-08-19 RX ADMIN — QUETIAPINE FUMARATE 300 MG: 300 TABLET, EXTENDED RELEASE ORAL at 21:07

## 2018-08-19 RX ADMIN — CYCLOBENZAPRINE HYDROCHLORIDE 5 MG: 10 TABLET, FILM COATED ORAL at 08:35

## 2018-08-19 RX ADMIN — GABAPENTIN 400 MG: 400 CAPSULE ORAL at 13:02

## 2018-08-19 RX ADMIN — CYCLOBENZAPRINE HYDROCHLORIDE 5 MG: 10 TABLET, FILM COATED ORAL at 21:06

## 2018-08-19 RX ADMIN — DIAZEPAM 5 MG: 5 TABLET ORAL at 04:03

## 2018-08-19 RX ADMIN — ESCITALOPRAM OXALATE 10 MG: 10 TABLET ORAL at 08:35

## 2018-08-19 RX ADMIN — DIAZEPAM 5 MG: 5 TABLET ORAL at 12:07

## 2018-08-19 RX ADMIN — GABAPENTIN 400 MG: 400 CAPSULE ORAL at 08:35

## 2018-08-19 NOTE — BH NOTE
Date:  8/18/18 Start Time: 830pm End Time: 915pm    Number Participants in Group:  11/19    Goal:  Patient will demonstrate increased interpersonal interaction   Topic: Relaxation and wrap up groups    Discipline Responsible:   OT  AT   x Nsg.  RT  Other       Participation Level:     None  Minimal   x Active Listener x Interactive    Monopolizing         Participation Quality:  x Appropriate  Inappropriate   x       Attentive        Intrusive   x       Sharing        Resistant   x       Supportive        Lethargic       Affective:   x Congruent  Incongruent  Blunted  Flat    Constricted  Anxious  Elated  Angry    Labile  Depressed  Other         Cognitive:  x Alert  Oriented PPTP     Concentration x G  F  P   Attention Span x G  F  P   Short-Term Memory x G  F  P   Long-Term Memory x G  F  P   ProblemSolving/  Decision Making x G  F  P   Ability to Process  Information x G  F  P      Contributing Factors             Delusional             Hallucinating             Flight of Ideas             Other:       Modes of Intervention:  x Education  Support  Exploration   x Clarifying  Problem Solving  Confrontation    Socialization  Limit Setting  Reality Testing    Activity  Movement  Media    Other:            Response to Learning:  x Able to verbalize current knowledge/experience   x Able to verbalize/acknowledge new learning    Able to retain information    Capable of insight    Able to change behavior    Progressing to goal    Other:        Comments:

## 2018-08-20 VITALS
HEART RATE: 103 BPM | WEIGHT: 210 LBS | BODY MASS INDEX: 37.21 KG/M2 | DIASTOLIC BLOOD PRESSURE: 74 MMHG | SYSTOLIC BLOOD PRESSURE: 111 MMHG | RESPIRATION RATE: 14 BRPM | HEIGHT: 63 IN | TEMPERATURE: 98.1 F

## 2018-08-20 LAB — GLUCOSE BLD-MCNC: 224 MG/DL (ref 65–105)

## 2018-08-20 PROCEDURE — 5130000000 HC BRIDGE APPOINTMENT

## 2018-08-20 PROCEDURE — 6370000000 HC RX 637 (ALT 250 FOR IP): Performed by: PSYCHIATRY & NEUROLOGY

## 2018-08-20 PROCEDURE — 82947 ASSAY GLUCOSE BLOOD QUANT: CPT

## 2018-08-20 RX ORDER — ESCITALOPRAM OXALATE 10 MG/1
10 TABLET ORAL DAILY
Qty: 30 TABLET | Refills: 0 | Status: SHIPPED | OUTPATIENT
Start: 2018-08-20

## 2018-08-20 RX ADMIN — DIAZEPAM 5 MG: 5 TABLET ORAL at 05:39

## 2018-08-20 RX ADMIN — TIOTROPIUM BROMIDE 18 MCG: 18 CAPSULE ORAL; RESPIRATORY (INHALATION) at 08:19

## 2018-08-20 RX ADMIN — AMLODIPINE BESYLATE 10 MG: 10 TABLET ORAL at 08:20

## 2018-08-20 RX ADMIN — ACETAMINOPHEN 650 MG: 325 TABLET, FILM COATED ORAL at 05:38

## 2018-08-20 RX ADMIN — HYDROXYZINE HYDROCHLORIDE 25 MG: 25 TABLET, FILM COATED ORAL at 12:49

## 2018-08-20 RX ADMIN — DEXTROAMPHETAMINE SACCHARATE, AMPHETAMINE ASPARTATE MONOHYDRATE, DEXTROAMPHETAMINE SULFATE, AND AMPHETAMINE SULFATE 20 MG: 2.5; 2.5; 2.5; 2.5 CAPSULE, EXTENDED RELEASE ORAL at 08:20

## 2018-08-20 RX ADMIN — ESCITALOPRAM OXALATE 10 MG: 10 TABLET ORAL at 08:20

## 2018-08-20 RX ADMIN — BUSPIRONE HYDROCHLORIDE 30 MG: 15 TABLET ORAL at 08:20

## 2018-08-20 RX ADMIN — GABAPENTIN 400 MG: 400 CAPSULE ORAL at 14:22

## 2018-08-20 RX ADMIN — ACETAMINOPHEN 650 MG: 325 TABLET, FILM COATED ORAL at 12:49

## 2018-08-20 RX ADMIN — CYCLOBENZAPRINE HYDROCHLORIDE 5 MG: 10 TABLET, FILM COATED ORAL at 14:22

## 2018-08-20 RX ADMIN — GABAPENTIN 400 MG: 400 CAPSULE ORAL at 08:20

## 2018-08-20 RX ADMIN — CYCLOBENZAPRINE HYDROCHLORIDE 5 MG: 10 TABLET, FILM COATED ORAL at 05:38

## 2018-08-20 RX ADMIN — METFORMIN HYDROCHLORIDE 500 MG: 500 TABLET ORAL at 08:20

## 2018-08-20 RX ADMIN — DIAZEPAM 5 MG: 5 TABLET ORAL at 14:22

## 2018-08-20 ASSESSMENT — PAIN DESCRIPTION - DESCRIPTORS: DESCRIPTORS: CRAMPING;BURNING;THROBBING

## 2018-08-20 ASSESSMENT — PAIN DESCRIPTION - ONSET: ONSET: ON-GOING

## 2018-08-20 ASSESSMENT — PAIN DESCRIPTION - PAIN TYPE
TYPE: CHRONIC PAIN

## 2018-08-20 ASSESSMENT — PAIN DESCRIPTION - LOCATION
LOCATION: BACK
LOCATION: BACK

## 2018-08-20 ASSESSMENT — PAIN SCALES - GENERAL
PAINLEVEL_OUTOF10: 0
PAINLEVEL_OUTOF10: 0
PAINLEVEL_OUTOF10: 3
PAINLEVEL_OUTOF10: 6

## 2018-08-20 ASSESSMENT — PAIN DESCRIPTION - FREQUENCY: FREQUENCY: CONTINUOUS

## 2018-08-20 ASSESSMENT — PAIN DESCRIPTION - ORIENTATION: ORIENTATION: LOWER

## 2018-08-20 ASSESSMENT — PAIN DESCRIPTION - PROGRESSION: CLINICAL_PROGRESSION: NOT CHANGED

## 2018-08-20 NOTE — PLAN OF CARE
Problem: Altered Mood, Depressive Behavior:  Goal: Able to verbalize acceptance of life and situations over which he or she has no control  Able to verbalize acceptance of life and situations over which he or she has no control   Outcome: Ongoing  PSYCHOEDUCATION GROUP NOTE    Date: 8/18/18  Start Time: 1:30p  End Time: 2:30p    Number Participants in Group:  7    Goal:  Patient will demonstrate increased interpersonal interaction   Topic: 7    Discipline Responsible:   OT  AT  [facility].  RT MHP Other       Participation Level:     None  Minimal   x Active Listener x Interactive    Monopolizing         Participation Quality:   Appropriate  Inappropriate          Attentive        Intrusive   x       Sharing        Resistant          Supportive        Lethargic       Affective:   x Congruent  Incongruent  Blunted  Flat    Constricted  Anxious  Elated  Angry    Labile  Depressed  Other         Cognitive:  x Alert x Oriented PPTP     Concentration x G  F  P   Attention Span x G  F  P   Short-Term Memory  G  F  P   Long-Term Memory  G  F  P   ProblemSolving/  Decision Making x G  F  P   Ability to Process  Information x G  F  P      Contributing Factors             Delusional             Hallucinating             Flight of Ideas             Other:       Modes of Intervention:  x Education x Support  Exploration    Clarifying  Problem Solving  Confrontation   x Socialization  Limit Setting  Reality Testing    Activity  Movement  Media    Other:            Response to Learning:  x Able to verbalize current knowledge/experience   x Able to verbalize/acknowledge new learning    Able to retain information    Capable of insight    Able to change behavior   x Progressing to goal    Other:        Comments:
Problem: Altered Mood, Depressive Behavior:  Goal: Able to verbalize acceptance of life and situations over which he or she has no control  Able to verbalize acceptance of life and situations over which he or she has no control   Outcome: Ongoing  Pt denies having thoughts to harm self or others. Pt denies any hallucinations. Pt reports feeling better overall since the increase in her lexapro. Pt rates depression as 3/10, anxiety 3/10. Pt medication compliant, out and social in milieu, attends select unit programming. Safety maintained through Q15 minute and irregular safety checks. Goal: Able to verbalize and/or display a decrease in depressive symptoms  Able to verbalize and/or display a decrease in depressive symptoms   Outcome: Ongoing  Pt denies having thoughts to harm self or others. Pt denies any hallucinations. Pt reports feeling better overall since the increase in her lexapro. Pt rates depression as 3/10, anxiety 3/10. Pt medication compliant, out and social in milieu, attends select unit programming. Safety maintained through Q15 minute and irregular safety checks. Goal: Absence of self-harm  Absence of self-harm   Outcome: Ongoing  Pt denies having thoughts to harm self or others. Pt denies any hallucinations. Pt reports feeling better overall since the increase in her lexapro. Pt rates depression as 3/10, anxiety 3/10. Pt medication compliant, out and social in milieu, attends select unit programming. Safety maintained through Q15 minute and irregular safety checks.
Problem: Altered Mood, Depressive Behavior:  Goal: Able to verbalize acceptance of life and situations over which he or she has no control  Able to verbalize acceptance of life and situations over which he or she has no control   Outcome: Ongoing  Pt did not attend Community Meeting at 0900 d/t resting in room despite staff invitation to attend.
Problem: Altered Mood, Depressive Behavior:  Goal: Able to verbalize acceptance of life and situations over which he or she has no control  Able to verbalize acceptance of life and situations over which he or she has no control   Outcome: Ongoing  Rachel Pillai states she isn't suicidal or homicidal. She states a lot of her depression is related to her back pain. She attended groups today. She is currently in the day area amongst her peers.
Problem: Altered Mood, Depressive Behavior:  Goal: Able to verbalize and/or display a decrease in depressive symptoms  Able to verbalize and/or display a decrease in depressive symptoms   Outcome: Ongoing  PSYCHOEDUCATION GROUP NOTE    Date: 8/20/18  Start Time: 0900  End Time: 0920    Number Participants in Group:  10    Goal:  Patient will demonstrate increased interpersonal interaction   Topic: Community meeting and goal setting    Discipline Responsible:   OT  AT  [facility]. x RT MHP Other       Participation Level:     None  Minimal    Active Listener x Interactive    Monopolizing         Participation Quality:  x Appropriate  Inappropriate   x       Attentive        Intrusive   x       Sharing        Resistant          Supportive        Lethargic       Affective:   x Congruent  Incongruent  Blunted  Flat    Constricted  Anxious  Elated  Angry    Labile  Depressed  Other         Cognitive:  x Alert x Oriented PPTP     Concentration x G  F  P   Attention Span x G  F  P   Short-Term Memory x G  F  P   Long-Term Memory x G  F  P   ProblemSolving/  Decision Making x G  F  P   Ability to Process  Information x G  F  P      Contributing Factors             Delusional             Hallucinating             Flight of Ideas             Other:       Modes of Intervention:  x Education x Support x Exploration    Clarifying  Problem Solving  Confrontation   x Socialization x Limit Setting  Reality Testing   x Activity  Movement  Media    Other:            Response to Learning:  x Able to verbalize current knowledge/experience   x Able to verbalize/acknowledge new learning   x Able to retain information   x Capable of insight   x Able to change behavior   x Progressing to goal    Other:        Comments: Pt attended group and participated.
Problem: Altered Mood, Depressive Behavior:  Goal: Able to verbalize and/or display a decrease in depressive symptoms  Able to verbalize and/or display a decrease in depressive symptoms   Outcome: Ongoing  PSYCHOEDUCATION GROUP NOTE    Date: 8/20/18  Start Time: 1330  End Time: 1415    Number Participants in Group:  9    Goal:  Patient will demonstrate increased interpersonal interaction   Topic: Leisure and Trivia    Discipline Responsible:   OT  AT  [facility]. x RT MHP Other       Participation Level:     None  Minimal    Active Listener x Interactive    Monopolizing         Participation Quality:  x Appropriate  Inappropriate   x       Attentive        Intrusive   x       Sharing        Resistant   x       Supportive        Lethargic       Affective:    Congruent  Incongruent  Blunted  Flat   x Constricted  Anxious  Elated  Angry    Labile  Depressed  Other         Cognitive:  x Alert x Oriented PPTP     Concentration  G x F  P   Attention Span x G  F  P   Short-Term Memory x G  F  P   Long-Term Memory x G  F  P   ProblemSolving/  Decision Making  G x F  P   Ability to Process  Information x G  F  P      Contributing Factors             Delusional             Hallucinating             Flight of Ideas             Other:       Modes of Intervention:  x Education x Support x Exploration    Clarifying  Problem Solving  Confrontation   x Socialization x Limit Setting  Reality Testing   x Activity  Movement  Media    Other:            Response to Learning:  x Able to verbalize current knowledge/experience   x Able to verbalize/acknowledge new learning   x Able to retain information   x Capable of insight   x Able to change behavior   x Progressing to goal    Other:        Comments: Pt attended group and participated.
Problem: Altered Mood, Depressive Behavior:  Goal: Able to verbalize and/or display a decrease in depressive symptoms  Able to verbalize and/or display a decrease in depressive symptoms   Outcome: Ongoing  Patient was not able to verbalize a decrease in depressive symptoms.
Problem: Altered Mood, Depressive Behavior:  Goal: Able to verbalize and/or display a decrease in depressive symptoms  Able to verbalize and/or display a decrease in depressive symptoms   Outcome: Ongoing  Patient was not able to verbalize a decrease in depressive symptoms.
Problem: Altered Mood, Depressive Behavior:  Goal: Able to verbalize support systems  Able to verbalize support systems   Outcome: Ongoing  Patient was not able to verbalize a support system at this time.
Problem: Altered Mood, Depressive Behavior:  Intervention: Group therapy to identify positive coping skills  PSYCHOEDUCATION GROUP NOTE    08/18/18              Start Time:     1000                   End Time: 9324      Number Participants in Group: 11/19  Topic/Discussion: community resources and discharge planning      Goals: developing discharge plan and linkage in community for additional resources       Provider: ASHVIN     Participation Level:     None  Minimal    Active Listener x Interactive    Monopolizing         Participation Quality:  x Appropriate  Inappropriate     Attentive   Intrusive     Sharing   Resistant     Supportive    Lethargic       Affective:   x Congruent  Incongruent  Blunted  Flat    Constricted  Anxious  Elated  Angry    Labile  Depressed  Irritable  Wtdrwn       Cognitive:  x Alert x Oriented PPTS     Concentration G x F  P    Attention Span G x F  P    Short-Term Memory G x F  P    Long-Term Memory G x F  P    ProblemSolving/  Decision Making G x F  P    Ability to Process  Information G x F  P       Contributing Factors             Delusional             Hallucinating             Flight of Ideas             Other: poor concentration       Modes of Intervention:  x Education x Support  Exploration   x Clarifying x Problem Solving  Confrontation   x Socialization  Limit Setting  Reality Testing   x Activity  Movement  Media    Other:          Response to Learning:  x Able to verbalize current knowledge/experience   x Able to verbalize/acknowledge new learning   x Able to retain information   x Capable of insight   x Able to change behavior   x Progressing to goal    Other: not making progress       Comments:  Participated in group
Problem: Altered Mood, Depressive Behavior:  Intervention: Group therapy to identify positive coping skills  PSYCHOEDUCATION GROUP NOTE    08/19/18              Start Time:     1000                   End Time: 1030      Number Participants in Group: 9/19  Topic/Discussion: coping skills and stress management      Goals: developing coping skills and identifying triggers       Provider: ASHVIN     Participation Level:     None  Minimal    Active Listener x Interactive    Monopolizing         Participation Quality:  x Appropriate  Inappropriate     Attentive   Intrusive     Sharing   Resistant     Supportive    Lethargic       Affective:   x Congruent  Incongruent  Blunted  Flat    Constricted  Anxious  Elated  Angry    Labile  Depressed  Irritable  Wtdrwn       Cognitive:  x Alert x Oriented PPTS     Concentration G x F  P    Attention Span G x F  P    Short-Term Memory G x F  P    Long-Term Memory G x F  P    ProblemSolving/  Decision Making G x F  P    Ability to Process  Information G x F  P       Contributing Factors             Delusional             Hallucinating             Flight of Ideas             Other: poor concentration       Modes of Intervention:  x Education x Support  Exploration   x Clarifying x Problem Solving  Confrontation   x Socialization  Limit Setting  Reality Testing   x Activity  Movement  Media    Other:          Response to Learning:  x Able to verbalize current knowledge/experience   x Able to verbalize/acknowledge new learning   x Able to retain information   x Capable of insight   x Able to change behavior   x Progressing to goal    Other: not making progress       Comments:  Participated in group
Problem: Altered Mood, Depressive Behavior:  Intervention: Group therapy to identify positive coping skills  Pt declined to attend psychotherapy at 1000 am despite encouragement. Pt offered 1:1 and refused.
None  Delusions: No  Memory:Normal: No  Memory: Poor Recent, Poor Remote  Insight and Judgment: No  Insight and Judgment: Poor Judgment, Poor Insight  Present Suicidal Ideation: Yes  Present Homicidal Ideation: No    EDUCATION:   Learner Progress Toward Treatment Goals:  Reviewed group plans and strategies for care    Method:  Group therapy, medication compliance, individualized assessments and care planning    Outcome:  Needs reinforcement    PATIENT GOALS:  Pt did not identify, to be discussed with patient within 72 hours.     PLAN/TREATMENT RECOMMENDATIONS:   Group therapy, medications compliance, goal setting, individualized assessments and care, continue to monitor pt on unit      SHORT-TERM GOALS:   Time frame for Short-Term Goals:  5-7 days    LONG-TERM GOALS:  Time frame for Long-Term Goals:  6 months    Members Present in Team Meeting:   See signature sheet    STEPHANIE Mcgill  Goal: Able to verbalize and/or display a decrease in depressive symptoms  Able to verbalize and/or display a decrease in depressive symptoms   Outcome: Ongoing    Goal: Able to verbalize support systems  Able to verbalize support systems   Outcome: Ongoing    Goal: Absence of self-harm  Absence of self-harm   Outcome: Ongoing

## 2018-08-20 NOTE — H&P
HISTORY and Ronda Jonas [id number]       NAME:  Grayson Uriarte  MRN: [id number]   YOB: 1975   Date: 2018   Age: 41 y.o. Gender: female     COMPLAINT AND PRESENT HISTORY:      Grayson Uriarte is 41 y.o.,  female, admitted because of depression. Pt presented to ED after breakup with significant other. She states her mother had breast lump removed, she herself had a lump removed from her breast, and a nephew that  of cancer. She states she has been very stressed as she \"watches kids all day,\" and never gets a break. She states her stress level \"boiled over,\" and she cut herself on the left wrist with a lancet she uses to check her blood sugar. She currently denies SI/HI. Pt has a history of previous suicide attempts by cutting. She states when she was younger, prior to age 17 she used to cut herself, reportedly on her thighs. Pt feels hopeless, helpless, worthless, lack of interest, loss of energy. Poor insight. Pt has poor sleep, good appetite, poor concentration and fair memory. No current auditory, visual or tactile hallucinations. Patients current stressors include fiances, living situation (lives with her daughter), breakup with significant other, relapse on alcohol. States prior to admission she was intoxicated and upset. Patient denies any current substance abuse. Pt did not state how much alcohol she is drinking per day . Patient lives with her daughter. Pt states she has been compliant with her medications, she is linked with [facility] since . She is c/o chronic low back pain, worse with movement. Per chart notes she recently fell down the stairs and she has some residual soreness. Nothing makes better, worse at rest or when standing. Recent xrays negative. No other associated symptoms.      DIAGNOSTIC RESULTS   Labs:  CBC:   Recent Labs      18   1030   WBC  6.6   HGB  12.1   PLT  237     BMP:    Recent Labs      18   1030   NA  137   K  3.8   CL removed    UPPER GASTROINTESTINAL ENDOSCOPY  12-29-15       FAMILY HISTORY       Family History   Problem Relation Age of Onset    Heart Disease Mother     Stroke Mother     High Blood Pressure Mother     Breast Cancer Mother     High Blood Pressure Father     Diabetes Father        SOCIAL HISTORY       Social History     Social History    Marital status: Single     Spouse name: N/A    Number of children: 2    Years of education: N/A     Occupational History    disability      Social History Main Topics    Smoking status: Current Some Day Smoker     Packs/day: 0.10     Years: 23.00     Types: Cigarettes     Last attempt to quit: 10/1/2015    Smokeless tobacco: Never Used    Alcohol use Yes      Comment: today    Drug use: No      Comment: hx marijuana quit 2004    Sexual activity: Yes     Partners: Female     Other Topics Concern    None     Social History Narrative    None           REVIEW OF SYSTEMS      Allergies   Allergen Reactions    Dye [Barium-Containing Compounds] Itching    Nsaids      bleeding    Demerol      Pt states wants demeraol but writer further interviewed pt and states itching very severe; held and wasted    Pcn [Penicillins]     Tramadol        No current facility-administered medications on file prior to encounter. Current Outpatient Prescriptions on File Prior to Encounter   Medication Sig Dispense Refill    diazepam (VALIUM) 5 MG tablet Take 1 tablet by mouth every 8 hours as needed for Anxiety for up to 10 days. . 10 tablet 0    cyclobenzaprine (FLEXERIL) 5 MG tablet Take 1 tablet by mouth 3 times daily as needed for Muscle spasms 90 tablet 2    gabapentin (NEURONTIN) 400 MG capsule Take 1 capsule by mouth 3 times daily for 30 days. . Will cause drowsiness. . 90 capsule 0    amLODIPine (NORVASC) 10 MG tablet Take 1 tablet by mouth daily 30 tablet 5    tiotropium (SPIRIVA HANDIHALER) 18 MCG inhalation capsule Inhale 1 capsule into the lungs daily 30 capsule 5 feeling of loneliness  Resolved Problems:    * No resolved hospital problems.  Antonia Souza, HALLEY - CNP on 8/20/2018 at 2:57 PM

## 2018-08-20 NOTE — BH NOTE
Patient given tobacco quitline number 05168342492 at this time, refusing to call at this time, states \" I just dont want to quit now\"- patient given information as to the dangers of long term tobacco use. Continue to reinforce the importance of tobacco cessation.

## 2018-08-21 RX ORDER — GABAPENTIN 400 MG/1
400 CAPSULE ORAL 3 TIMES DAILY
Qty: 90 CAPSULE | Refills: 0 | Status: SHIPPED | OUTPATIENT
Start: 2018-08-21 | End: 2018-10-02 | Stop reason: SDUPTHER

## 2018-08-27 ENCOUNTER — TELEPHONE (OUTPATIENT)
Dept: ADMINISTRATIVE | Age: 43
End: 2018-08-27

## 2018-09-11 ENCOUNTER — HOSPITAL ENCOUNTER (EMERGENCY)
Age: 43
Discharge: HOME | End: 2018-09-12
Attending: EMERGENCY MEDICINE
Payer: MEDICARE

## 2018-09-11 VITALS
TEMPERATURE: 98.7 F | HEART RATE: 106 BPM | DIASTOLIC BLOOD PRESSURE: 92 MMHG | SYSTOLIC BLOOD PRESSURE: 132 MMHG | RESPIRATION RATE: 18 BRPM | OXYGEN SATURATION: 94 %

## 2018-09-11 DIAGNOSIS — K52.9 ENTERITIS: Primary | ICD-10-CM

## 2018-09-11 LAB
ABSOLUTE EOS #: 0.22 K/UL (ref 0–0.44)
ABSOLUTE IMMATURE GRANULOCYTE: <0.03 K/UL (ref 0–0.3)
ABSOLUTE LYMPH #: 1.83 K/UL (ref 1.1–3.7)
ABSOLUTE MONO #: 0.44 K/UL (ref 0.1–1.2)
ANION GAP SERPL CALCULATED.3IONS-SCNC: 12 MMOL/L (ref 9–17)
BASOPHILS # BLD: 1 % (ref 0–2)
BASOPHILS ABSOLUTE: 0.04 K/UL (ref 0–0.2)
BUN BLDV-MCNC: 4 MG/DL (ref 6–20)
BUN/CREAT BLD: ABNORMAL (ref 9–20)
CALCIUM SERPL-MCNC: 9.1 MG/DL (ref 8.6–10.4)
CHLORIDE BLD-SCNC: 107 MMOL/L (ref 98–107)
CO2: 23 MMOL/L (ref 20–31)
CREAT SERPL-MCNC: 0.76 MG/DL (ref 0.5–0.9)
DATE, STOOL #1: NORMAL
DATE, STOOL #2: NORMAL
DATE, STOOL #3: NORMAL
DIFFERENTIAL TYPE: ABNORMAL
EOSINOPHILS RELATIVE PERCENT: 3 % (ref 1–4)
GFR AFRICAN AMERICAN: >60 ML/MIN
GFR NON-AFRICAN AMERICAN: >60 ML/MIN
GFR SERPL CREATININE-BSD FRML MDRD: ABNORMAL ML/MIN/{1.73_M2}
GFR SERPL CREATININE-BSD FRML MDRD: ABNORMAL ML/MIN/{1.73_M2}
GLUCOSE BLD-MCNC: 116 MG/DL (ref 70–99)
HCT VFR BLD CALC: 35.9 % (ref 36.3–47.1)
HEMOCCULT SP1 STL QL: NEGATIVE
HEMOCCULT SP2 STL QL: NORMAL
HEMOCCULT SP3 STL QL: NORMAL
HEMOGLOBIN: 11.4 G/DL (ref 11.9–15.1)
IMMATURE GRANULOCYTES: 0 %
LYMPHOCYTES # BLD: 26 % (ref 24–43)
Lab: NORMAL
MCH RBC QN AUTO: 26.5 PG (ref 25.2–33.5)
MCHC RBC AUTO-ENTMCNC: 31.8 G/DL (ref 28.4–34.8)
MCV RBC AUTO: 83.3 FL (ref 82.6–102.9)
MICRO OVA & PARASITES: NORMAL
MONOCYTES # BLD: 6 % (ref 3–12)
NRBC AUTOMATED: 0 PER 100 WBC
PDW BLD-RTO: 14.5 % (ref 11.8–14.4)
PLATELET # BLD: 293 K/UL (ref 138–453)
PLATELET ESTIMATE: ABNORMAL
PMV BLD AUTO: 9.9 FL (ref 8.1–13.5)
POTASSIUM SERPL-SCNC: 4.1 MMOL/L (ref 3.7–5.3)
RBC # BLD: 4.31 M/UL (ref 3.95–5.11)
RBC # BLD: ABNORMAL 10*6/UL
SEG NEUTROPHILS: 64 % (ref 36–65)
SEGMENTED NEUTROPHILS ABSOLUTE COUNT: 4.56 K/UL (ref 1.5–8.1)
SODIUM BLD-SCNC: 142 MMOL/L (ref 135–144)
SPECIMEN DESCRIPTION: NORMAL
STATUS: NORMAL
TIME, STOOL #1: 2140
TIME, STOOL #2: NORMAL
TIME, STOOL #3: NORMAL
WBC # BLD: 7.1 K/UL (ref 3.5–11.3)
WBC # BLD: ABNORMAL 10*3/UL

## 2018-09-11 PROCEDURE — 80048 BASIC METABOLIC PNL TOTAL CA: CPT

## 2018-09-11 PROCEDURE — 82705 FATS/LIPIDS FECES QUAL: CPT

## 2018-09-11 PROCEDURE — 87493 C DIFF AMPLIFIED PROBE: CPT

## 2018-09-11 PROCEDURE — 87209 SMEAR COMPLEX STAIN: CPT

## 2018-09-11 PROCEDURE — 87177 OVA AND PARASITES SMEARS: CPT

## 2018-09-11 PROCEDURE — 2580000003 HC RX 258: Performed by: STUDENT IN AN ORGANIZED HEALTH CARE EDUCATION/TRAINING PROGRAM

## 2018-09-11 PROCEDURE — G0328 FECAL BLOOD SCRN IMMUNOASSAY: HCPCS

## 2018-09-11 PROCEDURE — 85025 COMPLETE CBC W/AUTO DIFF WBC: CPT

## 2018-09-11 PROCEDURE — 87329 GIARDIA AG IA: CPT

## 2018-09-11 PROCEDURE — 99284 EMERGENCY DEPT VISIT MOD MDM: CPT

## 2018-09-11 PROCEDURE — 87328 CRYPTOSPORIDIUM AG IA: CPT

## 2018-09-11 PROCEDURE — 87505 NFCT AGENT DETECTION GI: CPT

## 2018-09-11 PROCEDURE — 6360000002 HC RX W HCPCS: Performed by: STUDENT IN AN ORGANIZED HEALTH CARE EDUCATION/TRAINING PROGRAM

## 2018-09-11 PROCEDURE — 96372 THER/PROPH/DIAG INJ SC/IM: CPT

## 2018-09-11 RX ORDER — PROMETHAZINE HYDROCHLORIDE 25 MG/ML
6.25 INJECTION, SOLUTION INTRAMUSCULAR; INTRAVENOUS ONCE
Status: COMPLETED | OUTPATIENT
Start: 2018-09-11 | End: 2018-09-11

## 2018-09-11 RX ORDER — ONDANSETRON 2 MG/ML
4 INJECTION INTRAMUSCULAR; INTRAVENOUS ONCE
Status: DISCONTINUED | OUTPATIENT
Start: 2018-09-11 | End: 2018-09-11

## 2018-09-11 RX ORDER — 0.9 % SODIUM CHLORIDE 0.9 %
500 INTRAVENOUS SOLUTION INTRAVENOUS ONCE
Status: COMPLETED | OUTPATIENT
Start: 2018-09-11 | End: 2018-09-12

## 2018-09-11 RX ADMIN — SODIUM CHLORIDE 500 ML: 9 INJECTION, SOLUTION INTRAVENOUS at 22:56

## 2018-09-11 RX ADMIN — PROMETHAZINE HYDROCHLORIDE 6.25 MG: 25 INJECTION, SOLUTION INTRAMUSCULAR; INTRAVENOUS at 22:35

## 2018-09-11 ASSESSMENT — PAIN DESCRIPTION - DESCRIPTORS: DESCRIPTORS: CRAMPING

## 2018-09-11 ASSESSMENT — PAIN SCALES - GENERAL: PAINLEVEL_OUTOF10: 9

## 2018-09-11 ASSESSMENT — PAIN DESCRIPTION - ORIENTATION: ORIENTATION: MID;LOWER

## 2018-09-11 ASSESSMENT — PAIN DESCRIPTION - PAIN TYPE: TYPE: ACUTE PAIN

## 2018-09-11 ASSESSMENT — PAIN DESCRIPTION - LOCATION: LOCATION: ABDOMEN

## 2018-09-12 PROCEDURE — 6360000002 HC RX W HCPCS: Performed by: STUDENT IN AN ORGANIZED HEALTH CARE EDUCATION/TRAINING PROGRAM

## 2018-09-12 PROCEDURE — 96374 THER/PROPH/DIAG INJ IV PUSH: CPT

## 2018-09-12 RX ORDER — MORPHINE SULFATE 4 MG/ML
2 INJECTION, SOLUTION INTRAMUSCULAR; INTRAVENOUS ONCE
Status: COMPLETED | OUTPATIENT
Start: 2018-09-12 | End: 2018-09-12

## 2018-09-12 RX ADMIN — MORPHINE SULFATE 2 MG: 4 INJECTION INTRAVENOUS at 00:54

## 2018-09-12 ASSESSMENT — PAIN SCALES - GENERAL: PAINLEVEL_OUTOF10: 9

## 2018-09-12 NOTE — ED PROVIDER NOTES
[facility]  Emergency Department  Faculty Attestation     I performed a history and physical examination of the patient and discussed management with the resident. I reviewed the residents note and agree with the documented findings and plan of care. Any areas of disagreement are noted on the chart. I was personally present for the key portions of any procedures. I have documented in the chart those procedures where I was not present during the key portions. I have reviewed the emergency nurses triage note. I agree with the chief complaint, past medical history, past surgical history, allergies, medications, social and family history as documented unless otherwise noted below. For Physician Assistant/ Nurse Practitioner cases/documentation I have personally evaluated this patient and have completed at least one if not all key elements of the E/M (history, physical exam, and MDM). Additional findings are as noted. Primary Care Physician:  Sara Nicholson MD    Screenings:  [unfilled]    CHIEF COMPLAINT       Chief Complaint   Patient presents with    Abdominal Pain    Incontinence     of stool       RECENT VITALS:   Temp: 98.7 °F (37.1 °C),  Pulse: 106, Resp: 18, BP: (!) 132/92    LABS:  Labs Reviewed   CULTURE STOOL   FECAL FAT, QUALITATIVE   OCCULT BLOOD SCREEN   CBC WITH AUTO DIFFERENTIAL   BASIC METABOLIC PANEL   OVA+PARASITES,FRESH       Radiology  No orders to display         Attending Physician Additional  Notes    Patient has crampy abdominal pain, refractory to Tylenol and Bentyl, diarrhea with odor and gas. No blurred she has tenderness. There is anorexia but no nausea or vomiting. She has fecal incontinence which she's had previously. This occurs predominantly when she is straining to urinate. No injection drug use low back pain weakness or saddle anesthesia. On exam she is uncomfortable tachycardic afebrile. Abdomen is soft and nontender. Impression is enteritis rule out infectious cause such as C. Difficile. Plan is stool studies, fluids, labs, symptomatic therapy. Anticipate discharge home. [facility].  Clive Rodas MD, [facility]  Attending Emergency  Physician                Marcelina Little MD  09/11/18 3363

## 2018-09-12 NOTE — ED NOTES
Multiple unsuccessful attempts to establish IV access.  Medic called to bedside     Radha Flores RN  09/11/18 1199

## 2018-09-12 NOTE — ED NOTES
Pt resting on cart with call light within reach. NAD noted, RR even and NL. Awaiting further orders, will continue to monitor.      Erasto Quinn RN  09/11/18 5314

## 2018-09-12 NOTE — ED NOTES
Dr. Cb Duncan at bedside. Pt resting on cart with call light within reach. NAD noted, RR even and NL.  Will continue to monitor     Brianda Cruz RN  09/11/18 2107       Brianda Cruz RN  09/11/18 2129

## 2018-09-12 NOTE — ED PROVIDER NOTES
[facility]  ED  Emergency Department Encounter  Emergency Medicine Resident     Pt Name: Tamika Jordan  MRN: [id number]  [place] 1975  Date of evaluation: 9/11/18  PCP:  Thang Velazquez MD    CHIEF COMPLAINT       Chief Complaint   Patient presents with    Abdominal Pain    Incontinence     of stool       HISTORY OF PRESENT ILLNESS  (Location/Symptom, Timing/Onset, Context/Setting, Quality, Duration, Modifying Factors, Severity.)      Tamika Jordan is a 41 y.o. female past history of IBS, diabetes mellitus with peripheral neuropathy, degenerative lumbar joint disease is presenting to the ED with chief complaints of stool incontinence and diarrhea for the last 3 days. Patient states that she has had issues with chronic diarrhea for the past 2 years and was admitted then for black stools and underwent a colonoscopy which showed infection with no other positive findings and was treated with antibiotics. She has had soft stools however has accompanying stool incontinence whenever she has diarrhea. For the last few days whenever she urinates she has accompanied stool incontinence and is extremely frustrated over it. She also complains of generalized abdominal pain which she relates 8/10 with no radiation. She denies any nausea, vomiting, changes in weight, pain anywhere else in the body, recent IV drug abuse. PAST MEDICAL / SURGICAL / SOCIAL / FAMILY HISTORY      has a past medical history of Alcohol abuse; Anxiety; Arthritis; Asthma; Back pain, chronic; Cerebral artery occlusion with cerebral infarction ([address] Colitis; Depression; Diabetic neuropathy ([address] Diarrhea; Diarrhea; Drug abuse; Eye injury; Fibromyalgia; Full dentures; Full dentures; H/O degenerative disc disease; Hidradenitis suppurativa; Hypertension; IBS (irritable bowel syndrome); Lipoma; Lump of breast, left; Migraine; Mixed hyperlipidemia; Numbness;  Snores; Type 2 diabetes mellitus without complication ([address] and Wears glasses. has a past surgical history that includes  section; Spinal fixation surgery with implant; orthopedic surgery (Bilateral); Upper gastrointestinal endoscopy (12-29-15); other surgical history (Left, 2016); Nerve Block (Right, 2016); Nerve Block (Right, 2016); Nerve Block (Right, 2017); Nerve Block (Right, 2017); Nerve Block (Left, 2017); lipoma resection (Left, 01/15/2018); pr exc skin benig 0.6-1cm face,facial (Left, 1/15/2018); and Breast lumpectomy (Left). Social History     Social History    Marital status: Single     Spouse name: N/A    Number of children: 2    Years of education: N/A     Occupational History    disability      Social History Main Topics    Smoking status: Current Some Day Smoker     Packs/day: 0.10     Years: 23.00     Types: Cigarettes     Last attempt to quit: 10/1/2015    Smokeless tobacco: Never Used    Alcohol use Yes      Comment: today    Drug use: No      Comment: hx marijuana quit     Sexual activity: Yes     Partners: Female     Other Topics Concern    Not on file     Social History Narrative    No narrative on file       Family History   Problem Relation Age of Onset    Heart Disease Mother     Stroke Mother     High Blood Pressure Mother     Breast Cancer Mother     High Blood Pressure Father     Diabetes Father        Allergies:  Dye [barium-containing compounds]; Nsaids; Demerol; Pcn [penicillins]; and Tramadol    Home Medications:  Prior to Admission medications    Medication Sig Start Date End Date Taking? Authorizing Provider   gabapentin (NEURONTIN) 400 MG capsule Take 1 capsule by mouth 3 times daily for 30 days. . 18  Ted Mendoza MD   escitalopram (LEXAPRO) 10 MG tablet Take 1 tablet by mouth daily 18   Joaquin Eng MD   cyclobenzaprine (FLEXERIL) 5 MG tablet Take 1 tablet by mouth 3 times daily as needed for Muscle spasms 18   Ted Mendoza MD   gabapentin

## 2018-09-12 NOTE — ED NOTES
Pt to ED c/o generalized abdominal pain with diarrhea that began today. Pt stating she has had an episode of losing control of her bowels and going to the bathroom on herself. Pt reporting her stool is very loose and runny. Pt resting on cart with call light within reach. NAD noted, RR even and NL.  Will continue to monitor     Noy Jackson RN  09/11/18 2535

## 2018-09-14 LAB
FAT QUALITATIVE SPLIT STOOL: NORMAL
FECAL NEUTRAL FAT: NORMAL

## 2018-09-18 ENCOUNTER — HOSPITAL ENCOUNTER (EMERGENCY)
Age: 43
Discharge: HOME | End: 2018-09-18
Attending: EMERGENCY MEDICINE
Payer: MEDICARE

## 2018-09-18 VITALS
TEMPERATURE: 97.2 F | OXYGEN SATURATION: 96 % | HEART RATE: 119 BPM | RESPIRATION RATE: 18 BRPM | HEIGHT: 63 IN | BODY MASS INDEX: 37.21 KG/M2 | WEIGHT: 210 LBS

## 2018-09-18 DIAGNOSIS — R45.851 SUICIDAL IDEATION: Primary | ICD-10-CM

## 2018-09-18 PROCEDURE — 99284 EMERGENCY DEPT VISIT MOD MDM: CPT

## 2018-09-18 ASSESSMENT — ENCOUNTER SYMPTOMS
DIARRHEA: 0
RHINORRHEA: 0
ABDOMINAL PAIN: 0
NAUSEA: 0
EYE PAIN: 0
EYE REDNESS: 0
BLOOD IN STOOL: 0
COUGH: 0
SINUS PAIN: 0
APNEA: 0
SHORTNESS OF BREATH: 0
WHEEZING: 0
VOMITING: 0

## 2018-09-18 NOTE — ED PROVIDER NOTES
PHYSICAL EXAM   (up to 7 for level 4, 8 or more for level 5)      INITIAL VITALS:   Pulse 119   Temp 97.2 °F (36.2 °C) (Oral)   Resp 18   Ht 5' 3\" (1.6 m)   Wt 210 lb (95.3 kg)   LMP 09/15/2018   SpO2 96%   BMI 37.20 kg/m²     Vitals:    09/18/18 0125   Pulse: 119   Resp: 18   Temp: 97.2 °F (36.2 °C)   TempSrc: Oral   SpO2: 96%   Weight: 210 lb (95.3 kg)   Height: 5' 3\" (1.6 m)       Physical Exam   Constitutional: She is oriented to person, place, and time. She appears well-developed and well-nourished. No distress. HENT:   Head: Normocephalic and atraumatic. Nose: Nose normal.   Mouth/Throat: Oropharynx is clear and moist. No oropharyngeal exudate. Eyes: Pupils are equal, round, and reactive to light. Conjunctivae and EOM are normal. Right eye exhibits no discharge. Left eye exhibits no discharge. No scleral icterus. Neck: Normal range of motion. No tracheal deviation present. No thyromegaly present. Cardiovascular: Normal rate, regular rhythm, normal heart sounds and intact distal pulses. Exam reveals no gallop and no friction rub. No murmur heard. Pulmonary/Chest: Effort normal and breath sounds normal. No stridor. No respiratory distress. She has no wheezes. She has no rales. Abdominal: Soft. Bowel sounds are normal. She exhibits no distension and no mass. There is no tenderness. Musculoskeletal: Normal range of motion. Neurological: She is alert and oriented to person, place, and time. She exhibits normal muscle tone. Coordination normal.   Skin: Skin is warm and dry. No rash noted. She is not diaphoretic. DIFFERENTIAL  DIAGNOSIS     PLAN (LABS / IMAGING / EKG):  No orders of the defined types were placed in this encounter. MEDICATIONS ORDERED:  No orders of the defined types were placed in this encounter.       DDX: SI, depression, anxiety    DIAGNOSTIC RESULTS / EMERGENCY DEPARTMENT COURSE / MDM     LABS:  No results found for this visit on

## 2018-09-21 NOTE — PROGRESS NOTES
mouth every 6 hours as needed for Pain  Notes to patient:  Pain relief     albuterol sulfate  (90 Base) MCG/ACT inhaler  Commonly known as:  PROAIR HFA  Inhale 2 puffs into the lungs every 6 hours as needed for Wheezing  Notes to patient:  wheezing     amLODIPine 10 MG tablet  Commonly known as:  NORVASC  Take 1 tablet by mouth daily  Notes to patient:  Lower blood pressure     amphetamine-dextroamphetamine 20 MG extended release capsule  Commonly known as:  ADDERALL XR  Notes to patient:  Improve concentration     busPIRone 30 MG tablet  Commonly known as:  BUSPAR  Notes to patient:  anxiety     cyclobenzaprine 5 MG tablet  Commonly known as:  FLEXERIL  Take 1 tablet by mouth 3 times daily as needed for Muscle spasms  Notes to patient:  Muscle relaxer     dicyclomine 20 MG tablet  Commonly known as:  BENTYL  Take 1 tablet by mouth 3 times daily as needed (cramping)  Notes to patient:  cramping     gabapentin 400 MG capsule  Commonly known as:  NEURONTIN  Take 1 capsule by mouth 3 times daily for 30 days. . Will cause drowsiness. .  Notes to patient:  neuropathy     metFORMIN 500 MG tablet  Commonly known as:  GLUCOPHAGE  Take 1 tablet by mouth 2 times daily (with meals)  Notes to patient:  Stabilize blood sugar     nicotine 14 MG/24HR  Commonly known as:  NICODERM CQ  Place 1 patch onto the skin every 24 hours  Notes to patient:  Smoking cessation     QUEtiapine 300 MG extended release tablet  Commonly known as:  SEROQUEL XR  Take 1 tablet by mouth nightly  Notes to patient:  Clear thoughts     tiotropium 18 MCG inhalation capsule  Commonly known as:  SPIRIVA HANDIHALER  Inhale 1 capsule into the lungs daily  Notes to patient:  Improve breathing        STOP taking these medications    blood glucose test strips     Lancets Misc     TRINTELLIX 10 MG Tabs tablet  Generic drug:  VORTIoxetine        ASK your doctor about these medications    diazepam 5 MG tablet  Commonly known as:  VALIUM  Take 1 tablet by mouth

## 2018-09-28 ENCOUNTER — TELEPHONE (OUTPATIENT)
Dept: INTERNAL MEDICINE | Age: 43
End: 2018-09-28

## 2018-10-02 ENCOUNTER — TELEPHONE (OUTPATIENT)
Dept: INTERNAL MEDICINE | Age: 43
End: 2018-10-02

## 2018-10-02 NOTE — TELEPHONE ENCOUNTER
E-scribe request for  Gabapentin last appt 6/4/2018 next appt 10/24/2018. Please review and e-scribe if applicable. Next Visit Date:  Future Appointments  Date Time Provider Lori Champagne   10/24/2018 3:00 PM Lisa Howell MD [facility] IM [id number]       Hemoglobin A1C (%)   Date Value   06/04/2018 6.6   05/18/2017 6.1 (H)   06/09/2016 6.4 (H)             ( goal A1C is < 7)   Microalb/Crt.  Ratio (mcg/mg creat)   Date Value   05/06/2016 23     LDL Cholesterol (mg/dL)   Date Value   06/09/2016 54       (goal LDL is <100)   AST (U/L)   Date Value   07/25/2017 22     ALT (U/L)   Date Value   07/25/2017 15     BUN (mg/dL)   Date Value   09/11/2018 4 (L)     BP Readings from Last 3 Encounters:   09/11/18 (!) 132/92   08/16/18 136/79   08/11/18 112/82          (goal 120/80)        Patient Active Problem List:     Lumbar back pain     Depression     Fibromyalgia     Anxiety     Type 2 diabetes mellitus without complication (HCC)     Iron deficiency anemia     Mixed hyperlipidemia     Rib pain on right side     Facet degeneration of lumbar region     Lipoma of chest wall     Bipolar disorder ([address]     Depressed mood with feeling of loneliness

## 2018-10-03 ENCOUNTER — HOSPITAL ENCOUNTER (EMERGENCY)
Age: 43
Discharge: HOME | End: 2018-10-03
Attending: EMERGENCY MEDICINE
Payer: MEDICARE

## 2018-10-03 ENCOUNTER — APPOINTMENT (OUTPATIENT)
Dept: GENERAL RADIOLOGY | Age: 43
End: 2018-10-03
Payer: MEDICARE

## 2018-10-03 VITALS
DIASTOLIC BLOOD PRESSURE: 93 MMHG | SYSTOLIC BLOOD PRESSURE: 122 MMHG | TEMPERATURE: 98.2 F | RESPIRATION RATE: 16 BRPM | BODY MASS INDEX: 37.39 KG/M2 | HEART RATE: 76 BPM | OXYGEN SATURATION: 99 % | HEIGHT: 63 IN | WEIGHT: 211 LBS

## 2018-10-03 DIAGNOSIS — M62.838 MUSCLE SPASM: ICD-10-CM

## 2018-10-03 DIAGNOSIS — S83.91XA SPRAIN OF RIGHT KNEE, UNSPECIFIED LIGAMENT, INITIAL ENCOUNTER: Primary | ICD-10-CM

## 2018-10-03 PROCEDURE — 99283 EMERGENCY DEPT VISIT LOW MDM: CPT

## 2018-10-03 PROCEDURE — 6370000000 HC RX 637 (ALT 250 FOR IP): Performed by: PHYSICIAN ASSISTANT

## 2018-10-03 PROCEDURE — 73562 X-RAY EXAM OF KNEE 3: CPT

## 2018-10-03 RX ORDER — HYDROCODONE BITARTRATE AND ACETAMINOPHEN 5; 325 MG/1; MG/1
1 TABLET ORAL ONCE
Status: COMPLETED | OUTPATIENT
Start: 2018-10-03 | End: 2018-10-03

## 2018-10-03 RX ORDER — ACETAMINOPHEN 325 MG/1
650 TABLET ORAL EVERY 6 HOURS PRN
Qty: 30 TABLET | Refills: 0 | Status: SHIPPED | OUTPATIENT
Start: 2018-10-03 | End: 2019-01-06 | Stop reason: ALTCHOICE

## 2018-10-03 RX ADMIN — HYDROCODONE BITARTRATE AND ACETAMINOPHEN 1 TABLET: 5; 325 TABLET ORAL at 14:30

## 2018-10-03 ASSESSMENT — PAIN DESCRIPTION - LOCATION: LOCATION: KNEE

## 2018-10-03 ASSESSMENT — ENCOUNTER SYMPTOMS
WHEEZING: 0
EYE ITCHING: 0
BACK PAIN: 0
SORE THROAT: 0
COUGH: 0
EYE PAIN: 0
COLOR CHANGE: 0
EYE DISCHARGE: 0
RHINORRHEA: 0
VOMITING: 0
NAUSEA: 0

## 2018-10-03 ASSESSMENT — PAIN SCALES - GENERAL
PAINLEVEL_OUTOF10: 7
PAINLEVEL_OUTOF10: 8

## 2018-10-03 ASSESSMENT — PAIN DESCRIPTION - FREQUENCY: FREQUENCY: CONTINUOUS

## 2018-10-03 ASSESSMENT — PAIN DESCRIPTION - PAIN TYPE: TYPE: ACUTE PAIN

## 2018-10-03 ASSESSMENT — PAIN DESCRIPTION - ORIENTATION: ORIENTATION: RIGHT

## 2018-10-03 NOTE — ED PROVIDER NOTES
[facility] ED  Emergency Department Encounter  Advanced Practice Provider     Pt Name: Kenn Miranda  MRN: [id number]  [place] 1975  Date of evaluation: 10/3/18  PCP:  Nevin Swain MD    [address] [place] [address]       Chief Complaint   Patient presents with    Knee Pain       HISTORY OF PRESENT ILLNESS  (Location/Symptom, Timing/Onset, Context/Setting, Quality, Duration, Modifying Factors, Severity.)      Kenn Miranda is a 41 y.o. female who presents with Right knee pain. Patient states about 20 minutes prior to arrival she was coming down some outside steps when she twisted her knee. She did not fall. She has diffuse pain all over her knee. She denies any numbness or tingling. She is able to ambulate. She has not taken anything for her pain. She has never had any surgeries or previous injuries. She denies any other injury. No numbness or tingling. PAST MEDICAL / SURGICAL / SOCIAL / FAMILY HISTORY      has a past medical history of Alcohol abuse; Anxiety; Arthritis; Asthma; Back pain, chronic; Cerebral artery occlusion with cerebral infarction ([address] Colitis; Depression; Diabetic neuropathy ([address] Diarrhea; Diarrhea; Drug abuse ([address] Eye injury; Fibromyalgia; Full dentures; Full dentures; H/O degenerative disc disease; Hidradenitis suppurativa; Hypertension; IBS (irritable bowel syndrome); Lipoma; Lump of breast, left; Migraine; Mixed hyperlipidemia; Numbness; Snores; Type 2 diabetes mellitus without complication ([address] and Wears glasses. has a past surgical history that includes  section; Spinal fixation surgery with implant; orthopedic surgery (Bilateral); Upper gastrointestinal endoscopy (12-29-15); other surgical history (Left, 2016); Nerve Block (Right, 2016); Nerve Block (Right, 2016); Nerve Block (Right, 2017); Nerve Block (Right, 2017);  Nerve Block (Left, 2017); lipoma resection (Left, 01/15/2018); pr exc skin benig 0.6-1cm face,facial (Left, 1/15/2018); and Breast lumpectomy (Left). Social History     Social History    Marital status: Single     Spouse name: N/A    Number of children: 2    Years of education: N/A     Occupational History    disability      Social History Main Topics    Smoking status: Current Some Day Smoker     Packs/day: 0.10     Years: 23.00     Types: Cigarettes     Last attempt to quit: 10/1/2015    Smokeless tobacco: Never Used    Alcohol use Yes      Comment: today    Drug use: No      Comment: hx marijuana quit 2004    Sexual activity: Yes     Partners: Female     Other Topics Concern    Not on file     Social History Narrative    No narrative on file       Family History   Problem Relation Age of Onset    Heart Disease Mother     Stroke Mother     High Blood Pressure Mother     Breast Cancer Mother     High Blood Pressure Father     Diabetes Father        Allergies:  Dye [barium-containing compounds]; Nsaids; Demerol; Pcn [penicillins]; and Tramadol    Home Medications:  Prior to Admission medications    Medication Sig Start Date End Date Taking? Authorizing Provider   acetaminophen (TYLENOL) 325 MG tablet Take 2 tablets by mouth every 6 hours as needed for Pain 10/3/18  Yes Jia Suresh PA-C   gabapentin (NEURONTIN) 400 MG capsule Take 1 capsule by mouth 3 times daily for 30 days. . 8/21/18 9/20/18  Orion Steele MD   escitalopram (LEXAPRO) 10 MG tablet Take 1 tablet by mouth daily 8/20/18   Manjeet Ramirez MD   cyclobenzaprine (FLEXERIL) 5 MG tablet Take 1 tablet by mouth 3 times daily as needed for Muscle spasms 7/19/18   Orion Steele MD   gabapentin (NEURONTIN) 400 MG capsule Take 1 capsule by mouth 3 times daily for 30 days. . Will cause drowsiness. . 7/19/18 8/18/18  Orion Steele MD   amLODIPine (NORVASC) 10 MG tablet Take 1 tablet by mouth daily 6/4/18   Reva Kapoor MD   tiotropium (SPIRIVA HANDIHALER) 18 MCG inhalation capsule Inhale 1 capsule into the lungs Monitoring: Documentation:  (54 prescriptions by 10 providers) (Porsche Blackman PA-C)    DIAGNOSTIC RESULTS / [facility]   There is no anatomical abnormalities seen on the x-ray. Most likely a knee sprain. Was patient follow-up with her orthopod as she does state that she has an orthopod, Ace wrap and Tylenol for comfort as well as ice to the area for 10 minutes every hour. We will offer patient crutches as well. 2:55 PM  I do not see any need notes from orthopedics. There are notes from podiatry however her current pain is knee pain. We'll have her follow up with PCP for further referral to orthopedics if needed      RADIOLOGY:   I directly visualized (with the attending physician) the following  images and reviewed the radiologist interpretations:  No results found. XR KNEE RIGHT (3 VIEWS)   Final Result   No acute osseous abnormalities. No significant joint effusion. LABS:  No results found for this visit on 10/03/18. FINAL IMPRESSION      1. Sprain of right knee, unspecified ligament, initial encounter    2.  Muscle spasm          DISPOSITION / PLAN     DISPOSITION Decision To Discharge    PATIENT REFERRED TO:  MD Johnnie Pacheco [facility] [address]  [phone]    Schedule an appointment as soon as possible for a visit         DISCHARGE MEDICATIONS:  Discharge Medication List as of 10/3/2018  3:22 PM          Porsche Blackman PA-C   Emergency Medicine Physician Assistant    (Please note that portions of this note were completed with a voice recognition program.  Efforts were made to edit the dictations but occasionally words are mis-transcribed.)        Porsche Blackman PA-C  10/03/18 [id number]

## 2018-10-04 RX ORDER — GABAPENTIN 400 MG/1
CAPSULE ORAL
Qty: 90 CAPSULE | Refills: 3 | Status: SHIPPED | OUTPATIENT
Start: 2018-10-04 | End: 2018-10-09 | Stop reason: SDUPTHER

## 2018-10-09 ENCOUNTER — OFFICE VISIT (OUTPATIENT)
Dept: INTERNAL MEDICINE | Age: 43
End: 2018-10-09
Payer: MEDICARE

## 2018-10-09 VITALS
HEART RATE: 115 BPM | BODY MASS INDEX: 38.97 KG/M2 | SYSTOLIC BLOOD PRESSURE: 137 MMHG | DIASTOLIC BLOOD PRESSURE: 112 MMHG | WEIGHT: 220 LBS

## 2018-10-09 DIAGNOSIS — M17.11 OSTEOARTHRITIS OF RIGHT KNEE, UNSPECIFIED OSTEOARTHRITIS TYPE: Primary | ICD-10-CM

## 2018-10-09 DIAGNOSIS — E11.9 TYPE 2 DIABETES MELLITUS WITHOUT COMPLICATION, WITHOUT LONG-TERM CURRENT USE OF INSULIN (HCC): ICD-10-CM

## 2018-10-09 DIAGNOSIS — Z12.39 BREAST CANCER SCREENING: ICD-10-CM

## 2018-10-09 DIAGNOSIS — Z11.3 SCREENING EXAMINATION FOR STD (SEXUALLY TRANSMITTED DISEASE): ICD-10-CM

## 2018-10-09 PROCEDURE — 99211 OFF/OP EST MAY X REQ PHY/QHP: CPT | Performed by: INTERNAL MEDICINE

## 2018-10-09 PROCEDURE — 81001 URINALYSIS AUTO W/SCOPE: CPT | Performed by: STUDENT IN AN ORGANIZED HEALTH CARE EDUCATION/TRAINING PROGRAM

## 2018-10-09 PROCEDURE — 99214 OFFICE O/P EST MOD 30 MIN: CPT | Performed by: STUDENT IN AN ORGANIZED HEALTH CARE EDUCATION/TRAINING PROGRAM

## 2018-10-09 PROCEDURE — 2022F DILAT RTA XM EVC RTNOPTHY: CPT | Performed by: STUDENT IN AN ORGANIZED HEALTH CARE EDUCATION/TRAINING PROGRAM

## 2018-10-09 PROCEDURE — G8484 FLU IMMUNIZE NO ADMIN: HCPCS | Performed by: STUDENT IN AN ORGANIZED HEALTH CARE EDUCATION/TRAINING PROGRAM

## 2018-10-09 PROCEDURE — G8417 CALC BMI ABV UP PARAM F/U: HCPCS | Performed by: STUDENT IN AN ORGANIZED HEALTH CARE EDUCATION/TRAINING PROGRAM

## 2018-10-09 PROCEDURE — 3044F HG A1C LEVEL LT 7.0%: CPT | Performed by: STUDENT IN AN ORGANIZED HEALTH CARE EDUCATION/TRAINING PROGRAM

## 2018-10-09 PROCEDURE — 4004F PT TOBACCO SCREEN RCVD TLK: CPT | Performed by: STUDENT IN AN ORGANIZED HEALTH CARE EDUCATION/TRAINING PROGRAM

## 2018-10-09 PROCEDURE — G8427 DOCREV CUR MEDS BY ELIG CLIN: HCPCS | Performed by: STUDENT IN AN ORGANIZED HEALTH CARE EDUCATION/TRAINING PROGRAM

## 2018-10-09 RX ORDER — GABAPENTIN 300 MG/1
600 CAPSULE ORAL 3 TIMES DAILY
Qty: 180 CAPSULE | Refills: 0 | Status: SHIPPED | OUTPATIENT
Start: 2018-10-09 | End: 2018-11-11 | Stop reason: SDUPTHER

## 2018-10-09 ASSESSMENT — ENCOUNTER SYMPTOMS
HEMOPTYSIS: 0
COUGH: 0
BLURRED VISION: 0
NAUSEA: 1

## 2018-10-09 NOTE — PROGRESS NOTES
[facility]    Progress Note    Date of patient's visit: 10/9/2018  YOB: 1975  Patient's Name:  Merlinda Salter    Patient Care Team:  Shelly Leong MD as PCP - General (Internal Medicine)  HALLEY Hoskins - CNP as Nurse Practitioner (Pain Management)  Wesly Munoz DO as Consulting Physician (General Surgery)    REASON FOR VISIT: Routine outpatient follow     HISTORY OF PRESENT ILLNESS:    History was obtained from the patient. Merlinda Salter is a 41 y.o. is here for pain in the right knee since last one week after she had a fall as she was walking out of her house and missed a step and twisted her knee. There was no popping sound but the patient has been in excruciating burning/tearing pain in her right knee since then. The pain is 10/10 in intensity and is not relieved with any pain medication. It is unrelieved in any position, impairs the ambulation and does not allow the patient to sleep at night. There is no associated hx of fever /chills/nausea/vomitting or any bruising or any other injury associated with it. Per patient she  has a hx of many psychiatric problems - OCD, PTSD, depression for which she is on medication. She also has HTN for which she takes Norvasc, DM for which she takes metformin and asthma for which she takes an inhaler. She reports that she is  compliant with her medication.   Patient Active Problem List   Diagnosis    Lumbar back pain    Depression    Fibromyalgia    Anxiety    Type 2 diabetes mellitus without complication (HCC)    Iron deficiency anemia    Mixed hyperlipidemia    Rib pain on right side    Facet degeneration of lumbar region    Lipoma of chest wall    Bipolar disorder [address] [address]    Depressed mood with feeling of loneliness       ALLERGIES      Allergies   Allergen Reactions    Dye [Barium-Containing Compounds] Itching    Nsaids      bleeding    Demerol      Pt states wants

## 2018-10-09 NOTE — PATIENT INSTRUCTIONS
Medications e-scribe to pharmacy of pt's choice. Scripts for lab given to pt with fasting instructions, pt will get labs done 11/09/18. Script for Mammogram and instructions for [facility] given to pt. Scheduling will call with the appointment date and time. Please call [phone] if you haven't heard anything from them. Order for MRI of Knee faxed to [address] [place] [address] they will call pt for appt. Please call [phone] in not heard within 2 weeks. Patient to return to clinic 1 month (11/13/18). Cancelled appointment for 10/24/18. AVS reviewed and given to pt. It is very important for your care that you keep your appointment. If for some reason you are unable to keep your appointment it is equally important that you call our office at [phone] to cancel your appointment and reschedule. Failure to do so may result in your termination from our practice.   MB

## 2018-10-11 ENCOUNTER — TELEPHONE (OUTPATIENT)
Dept: INTERNAL MEDICINE | Age: 43
End: 2018-10-11

## 2018-10-11 DIAGNOSIS — M17.11 OSTEOARTHRITIS OF RIGHT KNEE, UNSPECIFIED OSTEOARTHRITIS TYPE: Primary | ICD-10-CM

## 2018-10-12 NOTE — TELEPHONE ENCOUNTER
The patient was not seen by me last visit. Last visit seen by Dr. Kristal Love. Please clarify with him as he has seen the patient regrding the MRI.

## 2018-10-15 ENCOUNTER — TELEPHONE (OUTPATIENT)
Dept: INTERNAL MEDICINE | Age: 43
End: 2018-10-15

## 2018-10-16 ENCOUNTER — APPOINTMENT (OUTPATIENT)
Dept: GENERAL RADIOLOGY | Age: 43
End: 2018-10-16
Payer: MEDICARE

## 2018-10-16 ENCOUNTER — HOSPITAL ENCOUNTER (EMERGENCY)
Age: 43
Discharge: HOME | End: 2018-10-16
Attending: EMERGENCY MEDICINE
Payer: MEDICARE

## 2018-10-16 VITALS
RESPIRATION RATE: 18 BRPM | SYSTOLIC BLOOD PRESSURE: 143 MMHG | DIASTOLIC BLOOD PRESSURE: 89 MMHG | OXYGEN SATURATION: 97 % | TEMPERATURE: 98.1 F | HEART RATE: 111 BPM

## 2018-10-16 DIAGNOSIS — M25.511 ACUTE PAIN OF RIGHT SHOULDER: Primary | ICD-10-CM

## 2018-10-16 DIAGNOSIS — S80.02XA CONTUSION OF LEFT KNEE, INITIAL ENCOUNTER: ICD-10-CM

## 2018-10-16 DIAGNOSIS — S83.91XA SPRAIN OF RIGHT KNEE, UNSPECIFIED LIGAMENT, INITIAL ENCOUNTER: ICD-10-CM

## 2018-10-16 PROCEDURE — 99284 EMERGENCY DEPT VISIT MOD MDM: CPT

## 2018-10-16 PROCEDURE — 73030 X-RAY EXAM OF SHOULDER: CPT

## 2018-10-16 PROCEDURE — 73562 X-RAY EXAM OF KNEE 3: CPT

## 2018-10-16 ASSESSMENT — ENCOUNTER SYMPTOMS
SORE THROAT: 0
DIARRHEA: 0
BACK PAIN: 0
COLOR CHANGE: 0
ABDOMINAL PAIN: 0
SHORTNESS OF BREATH: 0
VOMITING: 0
COUGH: 0
NAUSEA: 0

## 2018-10-16 ASSESSMENT — PAIN DESCRIPTION - LOCATION: LOCATION: KNEE;SHOULDER

## 2018-10-16 ASSESSMENT — PAIN DESCRIPTION - PROGRESSION: CLINICAL_PROGRESSION: NOT CHANGED

## 2018-10-16 ASSESSMENT — PAIN DESCRIPTION - ONSET: ONSET: PROGRESSIVE

## 2018-10-16 ASSESSMENT — PAIN DESCRIPTION - FREQUENCY: FREQUENCY: CONTINUOUS

## 2018-10-16 ASSESSMENT — PAIN DESCRIPTION - DESCRIPTORS: DESCRIPTORS: ACHING;PINS AND NEEDLES

## 2018-10-16 ASSESSMENT — PAIN DESCRIPTION - ORIENTATION: ORIENTATION: LEFT;RIGHT

## 2018-10-16 ASSESSMENT — PAIN SCALES - GENERAL: PAINLEVEL_OUTOF10: 9

## 2018-10-16 NOTE — ED PROVIDER NOTES
[facility] ED  eMERGENCY dEPARTMENT eNCOUnter      Pt Name: August Coyne  MRN: [id number]  [place] 1975  Date of evaluation: 10/16/2018  Provider: Burak Anderson Dr       Chief Complaint   Patient presents with    Back Pain     pt states she fell hiting knee, and rotating rt shoulder, now has decreased ROM to shoulder and states she has bilat knee pain, ambulates to room.  Knee Injury    Shoulder Pain             HISTORY OF PRESENT ILLNESS  (Location/Symptom, Timing/Onset, Context/Setting, Quality, Duration, Modifying Factors, Severity.)   August Coyne is a 41 y.o. female who presents to the emergencydepartment Status post fall yesterday as she was walking up the [place] she states that there were cement crumbles noted to the [employer] and she slipped on the cement crumbles and fell onto both of her knees and her backpack landed on her right shoulder. She did not hit her head or lose consciousness. She is not any blood thinning medicines at this time. No neck pain. She is ambulatory. No other symptoms. REVIEW OF SYSTEMS    (2-9 systems for level 4, 10 ormore for level 5)     Review of Systems   Constitutional: Negative for chills, fatigue and fever. HENT: Negative for sore throat. Respiratory: Negative for cough and shortness of breath. Cardiovascular: Negative for chest pain, palpitations and leg swelling. Gastrointestinal: Negative for abdominal pain, diarrhea, nausea and vomiting. Genitourinary: Negative for flank pain. Musculoskeletal: Negative for back pain, neck pain and neck stiffness. Right shoulder pain and bilateral knee pain. Skin: Negative for color change. Neurological: Negative for dizziness, facial asymmetry, speech difficulty, weakness, light-headedness, numbness and headaches. PAST MEDICAL HISTORY         Diagnosis Date    Alcohol abuse     quit 15 yrs ago, recent relapse.      Anxiety     range of motion. Right shoulder: She exhibits tenderness and pain. She exhibits normal range of motion, no swelling, no effusion, no crepitus, no deformity, no laceration, no spasm, normal pulse and normal strength. Right knee: She exhibits normal range of motion, no swelling, no effusion, no ecchymosis, no deformity, no laceration, no erythema, normal alignment, no LCL laxity and normal patellar mobility. Tenderness found. Left knee: She exhibits effusion. She exhibits normal range of motion, no ecchymosis, no deformity, no laceration, no erythema, normal alignment, no LCL laxity and normal patellar mobility. Tenderness found. Legs:  Patient is neurovascularly intact. Cap refills less than 2 seconds in all extremities. Light sensation is intact. Proprioception is within normal limits. All compartments are soft and compressible. No septic joints noted. Patient is ambulatory. Motor function is 5 out of 5 bilaterally in all extremities. Distal pulses and deep tendon reflexes are within normal limits. Neurological: She is alert and oriented to person, place, and time. No cranial nerve deficit. Skin: Skin is warm and dry. Capillary refill takes less than 2 seconds. She is not diaphoretic. Psychiatric: She has a normal mood and affect. Her behavior is normal. Judgment and thought content normal.         DIAGNOSTIC RESULTS       RADIOLOGY:   Non-plain film images such asCT, Ultrasound and MRI are read by the radiologist. Plain radiographic images are visualized and preliminarily interpreted by Gabriele Arriaga PA-C with the below findings:    See below. Interpretation per the Radiologist below, if available at the time of this note:    XR KNEE LEFT (3 VIEWS)   Final Result   No acute abnormality of the knee. XR KNEE RIGHT (3 VIEWS)   Final Result   No acute abnormality of the knee. XR SHOULDER RIGHT (MIN 2 VIEWS)   Final Result   No acute abnormality.

## 2018-10-20 ENCOUNTER — HOSPITAL ENCOUNTER (OUTPATIENT)
Dept: MRI IMAGING | Age: 43
Discharge: HOME | End: 2018-10-22
Payer: MEDICARE

## 2018-10-20 DIAGNOSIS — M17.11 OSTEOARTHRITIS OF RIGHT KNEE, UNSPECIFIED OSTEOARTHRITIS TYPE: ICD-10-CM

## 2018-10-20 PROCEDURE — 73721 MRI JNT OF LWR EXTRE W/O DYE: CPT

## 2018-10-23 ENCOUNTER — OFFICE VISIT (OUTPATIENT)
Dept: INTERNAL MEDICINE | Age: 43
End: 2018-10-23
Payer: MEDICARE

## 2018-10-23 VITALS
HEIGHT: 63 IN | HEART RATE: 95 BPM | BODY MASS INDEX: 39.34 KG/M2 | WEIGHT: 222 LBS | SYSTOLIC BLOOD PRESSURE: 138 MMHG | DIASTOLIC BLOOD PRESSURE: 94 MMHG

## 2018-10-23 DIAGNOSIS — M25.511 ACUTE PAIN OF RIGHT SHOULDER: ICD-10-CM

## 2018-10-23 DIAGNOSIS — E11.9 TYPE 2 DIABETES MELLITUS WITHOUT COMPLICATION, WITHOUT LONG-TERM CURRENT USE OF INSULIN (HCC): ICD-10-CM
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
## 2018-10-23 PROCEDURE — 99211 OFF/OP EST MAY X REQ PHY/QHP: CPT | Performed by: INTERNAL MEDICINE

## 2018-10-23 PROCEDURE — 4004F PT TOBACCO SCREEN RCVD TLK: CPT | Performed by: INTERNAL MEDICINE

## 2018-10-23 PROCEDURE — 99213 OFFICE O/P EST LOW 20 MIN: CPT | Performed by: INTERNAL MEDICINE

## 2018-10-23 PROCEDURE — G8484 FLU IMMUNIZE NO ADMIN: HCPCS | Performed by: INTERNAL MEDICINE

## 2018-10-23 PROCEDURE — G8427 DOCREV CUR MEDS BY ELIG CLIN: HCPCS | Performed by: INTERNAL MEDICINE

## 2018-10-23 PROCEDURE — 2022F DILAT RTA XM EVC RTNOPTHY: CPT | Performed by: INTERNAL MEDICINE

## 2018-10-23 PROCEDURE — G8417 CALC BMI ABV UP PARAM F/U: HCPCS | Performed by: INTERNAL MEDICINE

## 2018-10-23 PROCEDURE — 3044F HG A1C LEVEL LT 7.0%: CPT | Performed by: INTERNAL MEDICINE

## 2018-10-23 RX ORDER — HYDROCODONE BITARTRATE AND ACETAMINOPHEN 5; 325 MG/1; MG/1
1 TABLET ORAL EVERY 8 HOURS PRN
Qty: 12 TABLET | Refills: 0 | Status: SHIPPED | OUTPATIENT
Start: 2018-10-23 | End: 2018-10-28

## 2018-10-23 NOTE — PROGRESS NOTES
Visit Information    Have you changed or started any medications since your last visit including any over-the-counter medicines, vitamins, or herbal medicines? no   Are you having any side effects from any of your medications? -  no  Have you stopped taking any of your medications? Is so, why? -  no    Have you seen any other physician or provider since your last visit? No  Have you had any other diagnostic tests since your last visit? Yes - Records Obtained  Have you been seen in the emergency room and/or had an admission to a hospital since we last saw you? Yes - Records Obtained  Have you had your routine dental cleaning in the past 6 months? no    Have you activated your [employer] account? If not, what are your barriers?  Yes     Patient Care Team:  Florecita Martinez MD as PCP - General (Internal Medicine)  HALLEY Mckeon - CNP as Nurse Practitioner (Pain Management)  Jose Killian DO as Consulting Physician (General Surgery)    Medical History Review  Past Medical, Family, and Social History reviewed and does not contribute to the patient presenting condition    Health Maintenance   Topic Date Due    Diabetic retinal exam  01/14/1987    HIV screen  01/14/1992    DTaP/Tdap/Td vaccine (1 - Tdap) 01/14/1996    Diabetic microalbuminuria test  05/06/2017    Lipid screen  06/09/2017    Diabetic foot exam  12/07/2017    Breast cancer screen  09/28/2018    Flu vaccine (1) 12/30/2018 (Originally 9/1/2018)    Cervical cancer screen  05/06/2019 (Originally 1/14/1998)    A1C test (Diabetic or Prediabetic)  06/04/2019    Pneumococcal med risk  Completed

## 2018-10-23 NOTE — PROGRESS NOTES
BLOCK Right 04/05/2017    right lumbar RF kenalog 40mg    NERVE BLOCK Left 08/11/2017    Lt MBNB #1  25% marcaine    ORTHOPEDIC SURGERY Bilateral     5 surgeries on each arm including carpal tunnel release lis    OTHER SURGICAL HISTORY Left 07/07/2016    Left Groin Hydradenitis    VA EXC SKIN BENIG 0.6-1CM FACE,FACIAL Left 1/15/2018    EXCISION LIPOMA CHEST WALL performed by Spencer [facility] DO at [facility]      spinal cord stimulator subsequently removed    UPPER GASTROINTESTINAL ENDOSCOPY  12-29-15         ALLERGIES      Allergies   Allergen Reactions    Dye [Barium-Containing Compounds] Itching    Nsaids      bleeding    Demerol      Pt states wants demeraol but writer further interviewed pt and states itching very severe; held and wasted    Pcn [Penicillins]     Tramadol        MEDICATIONS:      Current Outpatient Prescriptions on File Prior to Visit   Medication Sig Dispense Refill    diclofenac sodium (VOLTAREN) 1 % GEL Apply 4 g topically 4 times daily 5 Tube 0    Hot Packs (HEAT AND GO SPOT HOT ALVAREZ) MISC Use on knee 1 each 0    acetaminophen (TYLENOL) 325 MG tablet Take 2 tablets by mouth every 6 hours as needed for Pain 30 tablet 0    escitalopram (LEXAPRO) 10 MG tablet Take 1 tablet by mouth daily 30 tablet 0    cyclobenzaprine (FLEXERIL) 5 MG tablet Take 1 tablet by mouth 3 times daily as needed for Muscle spasms 90 tablet 2    amLODIPine (NORVASC) 10 MG tablet Take 1 tablet by mouth daily 30 tablet 5    tiotropium (SPIRIVA HANDIHALER) 18 MCG inhalation capsule Inhale 1 capsule into the lungs daily 30 capsule 5    dicyclomine (BENTYL) 20 MG tablet Take 1 tablet by mouth 3 times daily as needed (cramping) 120 tablet 3    albuterol sulfate HFA (PROAIR HFA) 108 (90 Base) MCG/ACT inhaler Inhale 2 puffs into the lungs every 6 hours as needed for Wheezing 2 Inhaler 3    nicotine (NICODERM CQ) 14 MG/24HR Place 1 patch onto the skin every 24 hours 30 symptoms of irregular blood glucose. Dispense: 1 kit; Refill: 0          FOLLOW UP AND INSTRUCTIONS:   Return in about 3 months (around 1/23/2019). 1. Alisa received counseling on the following healthy behaviors: nutrition, exercise and medication adherence    2. Reviewed prior labs and health maintenance. 3. Discussed use, benefit, and side effects of prescribed medications. Barriers to medication compliance addressed. All patient questions answered. Pt voiced understanding.        Omayra Robertson  Attending Physician, [address] [place] [address], Internal Medicine Residency Program  [address] [place] [address]  10/23/2018, 2:30 PM

## 2018-10-28 ASSESSMENT — ENCOUNTER SYMPTOMS
ABDOMINAL PAIN: 0
COUGH: 0
DIARRHEA: 0
SHORTNESS OF BREATH: 0
CONSTIPATION: 0
BACK PAIN: 0
BLOOD IN STOOL: 0
WHEEZING: 0

## 2018-10-30 ENCOUNTER — HOSPITAL ENCOUNTER (OUTPATIENT)
Dept: PHYSICAL THERAPY | Age: 43
Setting detail: THERAPIES SERIES
Discharge: HOME | End: 2018-10-30
Payer: MEDICARE

## 2018-11-06 ENCOUNTER — HOSPITAL ENCOUNTER (EMERGENCY)
Age: 43
Discharge: HOME | End: 2018-11-06
Attending: EMERGENCY MEDICINE
Payer: MEDICARE

## 2018-11-06 ENCOUNTER — APPOINTMENT (OUTPATIENT)
Dept: GENERAL RADIOLOGY | Age: 43
End: 2018-11-06
Payer: MEDICARE

## 2018-11-06 VITALS
SYSTOLIC BLOOD PRESSURE: 156 MMHG | BODY MASS INDEX: 37.21 KG/M2 | WEIGHT: 210 LBS | RESPIRATION RATE: 16 BRPM | TEMPERATURE: 98.1 F | OXYGEN SATURATION: 99 % | HEART RATE: 70 BPM | DIASTOLIC BLOOD PRESSURE: 116 MMHG | HEIGHT: 63 IN

## 2018-11-06 DIAGNOSIS — R11.2 NAUSEA AND VOMITING, INTRACTABILITY OF VOMITING NOT SPECIFIED, UNSPECIFIED VOMITING TYPE: ICD-10-CM

## 2018-11-06 DIAGNOSIS — B34.9 VIRAL ILLNESS: Primary | ICD-10-CM

## 2018-11-06 LAB
ABSOLUTE EOS #: 0.18 K/UL (ref 0–0.44)
ABSOLUTE IMMATURE GRANULOCYTE: <0.03 K/UL (ref 0–0.3)
ABSOLUTE LYMPH #: 1.91 K/UL (ref 1.1–3.7)
ABSOLUTE MONO #: 0.46 K/UL (ref 0.1–1.2)
ANION GAP SERPL CALCULATED.3IONS-SCNC: 12 MMOL/L (ref 9–17)
BASOPHILS # BLD: 1 % (ref 0–2)
BASOPHILS ABSOLUTE: 0.04 K/UL (ref 0–0.2)
BUN BLDV-MCNC: 9 MG/DL (ref 6–20)
BUN/CREAT BLD: ABNORMAL (ref 9–20)
CALCIUM SERPL-MCNC: 9.2 MG/DL (ref 8.6–10.4)
CHLORIDE BLD-SCNC: 105 MMOL/L (ref 98–107)
CO2: 24 MMOL/L (ref 20–31)
CREAT SERPL-MCNC: 0.84 MG/DL (ref 0.5–0.9)
DIFFERENTIAL TYPE: ABNORMAL
DIRECT EXAM: NORMAL
EKG ATRIAL RATE: 83 BPM
EKG P AXIS: 19 DEGREES
EKG P-R INTERVAL: 146 MS
EKG Q-T INTERVAL: 410 MS
EKG QRS DURATION: 70 MS
EKG QTC CALCULATION (BAZETT): 481 MS
EKG R AXIS: 9 DEGREES
EKG T AXIS: 26 DEGREES
EKG VENTRICULAR RATE: 83 BPM
EOSINOPHILS RELATIVE PERCENT: 3 % (ref 1–4)
GFR AFRICAN AMERICAN: >60 ML/MIN
GFR NON-AFRICAN AMERICAN: >60 ML/MIN
GFR SERPL CREATININE-BSD FRML MDRD: ABNORMAL ML/MIN/{1.73_M2}
GFR SERPL CREATININE-BSD FRML MDRD: ABNORMAL ML/MIN/{1.73_M2}
GLUCOSE BLD-MCNC: 136 MG/DL (ref 70–99)
HCG QUALITATIVE: NEGATIVE
HCT VFR BLD CALC: 39.9 % (ref 36.3–47.1)
HEMOGLOBIN: 12.1 G/DL (ref 11.9–15.1)
IMMATURE GRANULOCYTES: 0 %
LYMPHOCYTES # BLD: 27 % (ref 24–43)
Lab: NORMAL
MCH RBC QN AUTO: 25.5 PG (ref 25.2–33.5)
MCHC RBC AUTO-ENTMCNC: 30.3 G/DL (ref 28.4–34.8)
MCV RBC AUTO: 84 FL (ref 82.6–102.9)
MONOCYTES # BLD: 7 % (ref 3–12)
MYOGLOBIN: <21 NG/ML (ref 25–58)
NRBC AUTOMATED: 0 PER 100 WBC
PDW BLD-RTO: 14.6 % (ref 11.8–14.4)
PLATELET # BLD: 316 K/UL (ref 138–453)
PLATELET ESTIMATE: ABNORMAL
PMV BLD AUTO: 10 FL (ref 8.1–13.5)
POC TROPONIN I: 0 NG/ML (ref 0–0.1)
POC TROPONIN I: 0 NG/ML (ref 0–0.1)
POC TROPONIN INTERP: NORMAL
POC TROPONIN INTERP: NORMAL
POTASSIUM SERPL-SCNC: 4.1 MMOL/L (ref 3.7–5.3)
RBC # BLD: 4.75 M/UL (ref 3.95–5.11)
RBC # BLD: ABNORMAL 10*6/UL
SEG NEUTROPHILS: 62 % (ref 36–65)
SEGMENTED NEUTROPHILS ABSOLUTE COUNT: 4.48 K/UL (ref 1.5–8.1)
SODIUM BLD-SCNC: 141 MMOL/L (ref 135–144)
SPECIMEN DESCRIPTION: NORMAL
STATUS: NORMAL
TOTAL CK: 106 U/L (ref 26–192)
WBC # BLD: 7.1 K/UL (ref 3.5–11.3)
WBC # BLD: ABNORMAL 10*3/UL

## 2018-11-06 PROCEDURE — G0384 LEV 5 HOSP TYPE B ED VISIT: HCPCS

## 2018-11-06 PROCEDURE — 87804 INFLUENZA ASSAY W/OPTIC: CPT

## 2018-11-06 PROCEDURE — 82550 ASSAY OF CK (CPK): CPT

## 2018-11-06 PROCEDURE — 85025 COMPLETE CBC W/AUTO DIFF WBC: CPT

## 2018-11-06 PROCEDURE — 84484 ASSAY OF TROPONIN QUANT: CPT

## 2018-11-06 PROCEDURE — 84703 CHORIONIC GONADOTROPIN ASSAY: CPT

## 2018-11-06 PROCEDURE — 71045 X-RAY EXAM CHEST 1 VIEW: CPT

## 2018-11-06 PROCEDURE — 6360000002 HC RX W HCPCS: Performed by: STUDENT IN AN ORGANIZED HEALTH CARE EDUCATION/TRAINING PROGRAM

## 2018-11-06 PROCEDURE — 93005 ELECTROCARDIOGRAM TRACING: CPT

## 2018-11-06 PROCEDURE — 80048 BASIC METABOLIC PNL TOTAL CA: CPT

## 2018-11-06 PROCEDURE — 96374 THER/PROPH/DIAG INJ IV PUSH: CPT

## 2018-11-06 PROCEDURE — 6370000000 HC RX 637 (ALT 250 FOR IP): Performed by: STUDENT IN AN ORGANIZED HEALTH CARE EDUCATION/TRAINING PROGRAM

## 2018-11-06 PROCEDURE — 83874 ASSAY OF MYOGLOBIN: CPT

## 2018-11-06 PROCEDURE — 2580000003 HC RX 258: Performed by: STUDENT IN AN ORGANIZED HEALTH CARE EDUCATION/TRAINING PROGRAM

## 2018-11-06 RX ORDER — ONDANSETRON 4 MG/1
4 TABLET, ORALLY DISINTEGRATING ORAL EVERY 8 HOURS PRN
Qty: 10 TABLET | Refills: 0 | Status: SHIPPED | OUTPATIENT
Start: 2018-11-06 | End: 2020-01-18

## 2018-11-06 RX ORDER — ACETAMINOPHEN 325 MG/1
325 TABLET ORAL EVERY 6 HOURS PRN
Qty: 120 TABLET | Refills: 0 | Status: SHIPPED | OUTPATIENT
Start: 2018-11-06 | End: 2019-01-04 | Stop reason: SDUPTHER

## 2018-11-06 RX ORDER — 0.9 % SODIUM CHLORIDE 0.9 %
1000 INTRAVENOUS SOLUTION INTRAVENOUS ONCE
Status: COMPLETED | OUTPATIENT
Start: 2018-11-06 | End: 2018-11-06

## 2018-11-06 RX ORDER — ACETAMINOPHEN 500 MG
1000 TABLET ORAL ONCE
Status: COMPLETED | OUTPATIENT
Start: 2018-11-06 | End: 2018-11-06

## 2018-11-06 RX ORDER — FENTANYL CITRATE 50 UG/ML
50 INJECTION, SOLUTION INTRAMUSCULAR; INTRAVENOUS ONCE
Status: DISCONTINUED | OUTPATIENT
Start: 2018-11-06 | End: 2018-11-06

## 2018-11-06 RX ORDER — ONDANSETRON 2 MG/ML
4 INJECTION INTRAMUSCULAR; INTRAVENOUS ONCE
Status: COMPLETED | OUTPATIENT
Start: 2018-11-06 | End: 2018-11-06

## 2018-11-06 RX ORDER — FENTANYL CITRATE 50 UG/ML
50 INJECTION, SOLUTION INTRAMUSCULAR; INTRAVENOUS ONCE
Status: DISCONTINUED | OUTPATIENT
Start: 2018-11-06 | End: 2018-11-06 | Stop reason: HOSPADM

## 2018-11-06 RX ADMIN — ACETAMINOPHEN 1000 MG: 500 TABLET ORAL at 18:16

## 2018-11-06 RX ADMIN — SODIUM CHLORIDE 1000 ML: 9 INJECTION, SOLUTION INTRAVENOUS at 17:02

## 2018-11-06 RX ADMIN — SODIUM CHLORIDE 1000 ML: 9 INJECTION, SOLUTION INTRAVENOUS at 20:12

## 2018-11-06 RX ADMIN — ONDANSETRON 4 MG: 2 INJECTION INTRAMUSCULAR; INTRAVENOUS at 18:16

## 2018-11-06 ASSESSMENT — PAIN DESCRIPTION - PAIN TYPE: TYPE: ACUTE PAIN

## 2018-11-06 ASSESSMENT — PAIN SCALES - GENERAL
PAINLEVEL_OUTOF10: 8
PAINLEVEL_OUTOF10: 7
PAINLEVEL_OUTOF10: 6

## 2018-11-06 ASSESSMENT — PAIN DESCRIPTION - FREQUENCY: FREQUENCY: INTERMITTENT

## 2018-11-06 ASSESSMENT — PAIN DESCRIPTION - LOCATION: LOCATION: ABDOMEN;BACK;CHEST

## 2018-11-06 ASSESSMENT — PAIN DESCRIPTION - DESCRIPTORS: DESCRIPTORS: BURNING

## 2018-11-06 ASSESSMENT — HEART SCORE: ECG: 0

## 2018-11-06 NOTE — ED PROVIDER NOTES
(FLEXERIL) 5 MG tablet Take 1 tablet by mouth 3 times daily as needed for Muscle spasms 7/19/18   Harapl Doss MD   gabapentin (NEURONTIN) 400 MG capsule Take 1 capsule by mouth 3 times daily for 30 days. . Will cause drowsiness. . 7/19/18 8/18/18  Harpal Doss MD   amLODIPine (NORVASC) 10 MG tablet Take 1 tablet by mouth daily 6/4/18   Bere Antonio MD   tiotropium (Miles Riggins) 18 MCG inhalation capsule Inhale 1 capsule into the lungs daily 6/4/18   Bere Antonio MD   dicyclomine (BENTYL) 20 MG tablet Take 1 tablet by mouth 3 times daily as needed (cramping) 6/4/18   Bere Antonio MD   albuterol sulfate HFA (PROAIR HFA) 108 (90 Base) MCG/ACT inhaler Inhale 2 puffs into the lungs every 6 hours as needed for Wheezing 6/4/18   Bere Antonio MD   metFORMIN (GLUCOPHAGE) 500 MG tablet Take 1 tablet by mouth 2 times daily (with meals) 6/4/18   Bere Antonio MD   amphetamine-dextroamphetamine (ADDERALL XR) 20 MG extended release capsule take 1 capsule by mouth every morning 5/2/17   Historical Provider, MD   busPIRone (BUSPAR) 30 MG tablet Take 1 tablet by mouth 2 times daily 9/12/16   Historical Provider, MD   QUEtiapine (SEROQUEL XR) 300 MG XR tablet Take 1 tablet by mouth nightly 6/10/16   Chastity Erazo DO     Review of systems  (2-9 systems for level 4, 10 or more for level 5)      CONSTITUTIONAL: Denies recent fever, chills  EYES: No visual changes. NECK: No midline neck pain  RESPIRATORY: + shortness of breath. Denies dyspnea. CARDIAC: +  chest pain. The pain does not radiate. GI: Denies abdominal pain Denies  nausea, +  vomiting. Denies Blood in the stool or black tarry stools. : Denies dysuria  MUSCULOSKELETAL: Denies focal weakness. NEUROLOGICAL: denies headache or focal weakness. SKIN:  Denies any rash.       Physical exam  (up to 7 for level 4, 8 or more for level 5)      /83   Pulse 89   Temp 98.1 °F (36.7 °C) (Oral)   Resp 20   Ht 5' wks = Discharge home  Score 4 - 6 = 20.3%  MACE over next 6 wks = Obs admit  Score 7 - 10 = 72.7%  MACE over next 6 wks = Early invasive Rx    Chest Pain in the Emergency Room: A Multicenter Validation of the [address] [place] BE, Tommy AJ, Ezekiel ARA, Alfred TP, [employer], Mast EG, [place] SH, [facility]. Crit Pathw Cardiol. 2010 Sep; 9(3): 164-169. \"A prospective validation of the HEART score for chest pain patients at the emergency department. \" Int J Cardiol. 2013 Oct 3;168(3):2153-8. doi: 10.1016/j.ijcard. 2013.01.255. Epub 2013 Mar 7.     Procedures     None    Final impression      Viral Syndrome    Disposition with plan     Disposition: Pending, probable discharge - care turned over to Dr. Vitor Badillo MD  Emergency Medicine Resident    (Please note that portions of this note were completed with a voice recognition program.  Efforts were made to edit the dictations but occasionally words are mis-transcribed.)       Seema Gill MD  Resident  11/07/18 0633

## 2018-11-06 NOTE — ED PROVIDER NOTES
Received patient in Sign-out from Dr. Wilmer Kim, please see their HPI for full history and physical.    Briefly, this patient is a 41 y.o. female who presented with flu-like symptoms. PCR pending.       ---End Sign-out---    6:54 PM Flu A/B negative. Pending CPK. CPK negative. Patient discharged home with supportive care.      Jj Howell MD  11/06/18 0179

## 2018-11-06 NOTE — ED NOTES
2nd trop ran, pt vitals taken, IV fluids stopped, pt updated on plan of care will continue to monitor pt.      Bay Hassan RN  11/06/18 [id number]

## 2018-11-07 NOTE — ED PROVIDER NOTES
[address] [facility]  Emergency Department  Emergency Medicine Resident Sign-out     Care of Jaja Haines was assumed from Dr. Tayo Mccauley and is being seen for Nausea (Since Thursday); Emesis; and Chest Pain (x  1 1/2 days Pt states she also get bronchitis. Productive cough greenish in color)  . The patient's initial evaluation and plan have been discussed with the prior provider who initially evaluated the patient.      EMERGENCY DEPARTMENT COURSE / MEDICAL DECISION MAKING:       MEDICATIONS GIVEN:  Orders Placed This Encounter   Medications    0.9 % sodium chloride bolus    acetaminophen (TYLENOL) tablet 1,000 mg    DISCONTD: fentaNYL (SUBLIMAZE) injection 50 mcg    ondansetron (ZOFRAN) injection 4 mg    fentaNYL (SUBLIMAZE) injection 50 mcg    0.9 % sodium chloride bolus    acetaminophen (TYLENOL) 325 MG tablet     Sig: Take 1 tablet by mouth every 6 hours as needed for Pain     Dispense:  120 tablet     Refill:  0    ondansetron (ZOFRAN ODT) 4 MG disintegrating tablet     Sig: Take 1 tablet by mouth every 8 hours as needed for Nausea     Dispense:  10 tablet     Refill:  0       LABS / RADIOLOGY:     Labs Reviewed   BASIC METABOLIC PANEL - Abnormal; Notable for the following:        Result Value    Glucose 136 (*)     All other components within normal limits   CBC WITH AUTO DIFFERENTIAL - Abnormal; Notable for the following:     RDW 14.6 (*)     All other components within normal limits   MYOGLOBIN, SERUM - Abnormal; Notable for the following:     Myoglobin <21 (*)     All other components within normal limits   RAPID INFLUENZA A/B ANTIGENS   HCG, SERUM, QUALITATIVE   CK   POCT TROPONIN   POCT TROPONIN   POCT TROPONIN       Xr Shoulder Right (min 2 Views)    Result Date: 10/16/2018  EXAMINATION: 3 XRAY VIEWS OF THE RIGHT SHOULDER 10/16/2018 5:53 pm COMPARISON: July 28, 2016 HISTORY: ORDERING SYSTEM PROVIDED HISTORY: fall TECHNOLOGIST PROVIDED HISTORY: fall FINDINGS: Glenohumeral joint is

## 2018-11-08 DIAGNOSIS — M62.838 MUSCLE SPASM: ICD-10-CM

## 2018-11-09 RX ORDER — CYCLOBENZAPRINE HCL 5 MG
TABLET ORAL
Qty: 90 TABLET | Refills: 2 | Status: SHIPPED | OUTPATIENT
Start: 2018-11-09 | End: 2019-02-14 | Stop reason: SDUPTHER

## 2018-11-11 DIAGNOSIS — M17.11 OSTEOARTHRITIS OF RIGHT KNEE, UNSPECIFIED OSTEOARTHRITIS TYPE: ICD-10-CM

## 2018-11-12 RX ORDER — GABAPENTIN 300 MG/1
CAPSULE ORAL
Qty: 180 CAPSULE | Refills: 0 | Status: SHIPPED | OUTPATIENT
Start: 2018-11-12 | End: 2019-02-14 | Stop reason: SDUPTHER

## 2018-11-12 NOTE — TELEPHONE ENCOUNTER
rita request for GABAPENTIN 300 MG CAPSULE future appointment scheduled. Health Maintenance   Topic Date Due    Diabetic retinal exam  01/14/1987    HIV screen  01/14/1992    DTaP/Tdap/Td vaccine (1 - Tdap) 01/14/1996    Diabetic microalbuminuria test  05/06/2017    Lipid screen  06/09/2017    Diabetic foot exam  12/07/2017    Breast cancer screen  09/28/2018    Flu vaccine (1) 12/30/2018 (Originally 9/1/2018)    Cervical cancer screen  05/06/2019 (Originally 1/14/1998)    A1C test (Diabetic or Prediabetic)  06/04/2019    Pneumococcal med risk  Completed             (applicable per patient's age: Cancer Screenings, Depression Screening, Fall Risk Screening, Immunizations)    Hemoglobin A1C (%)   Date Value   06/04/2018 6.6   05/18/2017 6.1 (H)   06/09/2016 6.4 (H)     Microalb/Crt.  Ratio (mcg/mg creat)   Date Value   05/06/2016 23     LDL Cholesterol (mg/dL)   Date Value   06/09/2016 54     AST (U/L)   Date Value   07/25/2017 22     ALT (U/L)   Date Value   07/25/2017 15     BUN (mg/dL)   Date Value   11/06/2018 9      (goal A1C is < 7)   (goal LDL is <100) need 30-50% reduction from baseline     BP Readings from Last 3 Encounters:   11/06/18 (!) 156/116   10/23/18 (!) 138/94   10/16/18 (!) 143/89    (goal /80)      All Future Testing planned in [facility]:  Lab Frequency Next Occurrence   HAILEY DIGITAL SCREEN W CAD BILATERAL Once 01/17/2019   Lipid Panel Once 01/17/2019   HIV Screen Once 11/13/2018       Next Visit Date:  Future Appointments  Date Time Provider Lori Champagne   11/14/2018 2:30 PM SCHEDULE, [facility] MARTHA Davis   1/24/2019 2:00 PM Kody Figueroa MD [facility]            Patient Active Problem List:     Lumbar back pain     Depression     Fibromyalgia     Anxiety     Type 2 diabetes mellitus without complication (HCC)     Iron deficiency anemia     Mixed hyperlipidemia     Rib pain on right side     Facet degeneration of lumbar region     Lipoma of chest wall     Bipolar disorder ([facility] [address]     Depressed mood with feeling of loneliness

## 2018-12-13 ENCOUNTER — OFFICE VISIT (OUTPATIENT)
Dept: PRIMARY CARE CLINIC | Age: 43
End: 2018-12-13
Payer: MEDICARE

## 2018-12-13 VITALS
BODY MASS INDEX: 37.55 KG/M2 | WEIGHT: 212 LBS | SYSTOLIC BLOOD PRESSURE: 123 MMHG | OXYGEN SATURATION: 96 % | HEART RATE: 98 BPM | TEMPERATURE: 97.7 F | DIASTOLIC BLOOD PRESSURE: 93 MMHG

## 2018-12-13 DIAGNOSIS — Z12.31 ENCOUNTER FOR SCREENING MAMMOGRAM FOR BREAST CANCER: ICD-10-CM

## 2018-12-13 DIAGNOSIS — Z13.220 SCREENING FOR HYPERLIPIDEMIA: ICD-10-CM

## 2018-12-13 DIAGNOSIS — J20.9 ACUTE BRONCHITIS, UNSPECIFIED ORGANISM: Primary | ICD-10-CM

## 2018-12-13 DIAGNOSIS — J02.9 SORE THROAT: ICD-10-CM

## 2018-12-13 PROCEDURE — G8417 CALC BMI ABV UP PARAM F/U: HCPCS | Performed by: INTERNAL MEDICINE

## 2018-12-13 PROCEDURE — 4004F PT TOBACCO SCREEN RCVD TLK: CPT | Performed by: INTERNAL MEDICINE

## 2018-12-13 PROCEDURE — G8427 DOCREV CUR MEDS BY ELIG CLIN: HCPCS | Performed by: INTERNAL MEDICINE

## 2018-12-13 PROCEDURE — G8484 FLU IMMUNIZE NO ADMIN: HCPCS | Performed by: INTERNAL MEDICINE

## 2018-12-13 PROCEDURE — 99213 OFFICE O/P EST LOW 20 MIN: CPT | Performed by: INTERNAL MEDICINE

## 2018-12-13 RX ORDER — AZITHROMYCIN 250 MG/1
250 TABLET, FILM COATED ORAL SEE ADMIN INSTRUCTIONS
Qty: 6 TABLET | Refills: 0 | Status: SHIPPED | OUTPATIENT
Start: 2018-12-13 | End: 2018-12-18

## 2018-12-13 RX ORDER — DEXTROMETHORPHAN POLISTIREX 30 MG/5ML
60 SUSPENSION ORAL 2 TIMES DAILY PRN
Qty: 1 BOTTLE | Refills: 1 | Status: SHIPPED | OUTPATIENT
Start: 2018-12-13 | End: 2018-12-23

## 2018-12-13 ASSESSMENT — ENCOUNTER SYMPTOMS
BACK PAIN: 1
SORE THROAT: 1
ALLERGIC/IMMUNOLOGIC NEGATIVE: 1
VOMITING: 1
DIARRHEA: 1
COUGH: 1
ABDOMINAL PAIN: 1
EYES NEGATIVE: 1
NAUSEA: 1

## 2018-12-13 NOTE — PROGRESS NOTES
[address] [facility]  [place] [address]  Dept: [phone]  Dept Fax: [phone]    Memo Mukherjee is a 41 y.o. female who presents today for   Chief Complaint   Patient presents with    Congestion    Pharyngitis    Cough    Nausea & Vomiting    and follow upof chronic medical problems:   Patient Active Problem List   Diagnosis    Lumbar back pain    Depression    Fibromyalgia    Anxiety    Type 2 diabetes mellitus without complication ([address]    Iron deficiency anemia    Mixed hyperlipidemia    Rib pain on right side    Facet degeneration of lumbar region    Lipoma of chest wall    Bipolar disorder ([address]    Depressed mood with feeling of loneliness   . Past Medical History:   Diagnosis Date    Alcohol abuse     quit 15 yrs ago, recent relapse.      Anxiety     Arthritis     Asthma     Back pain, chronic     Cerebral artery occlusion with cerebral infarction (HCC)     tia    Colitis     Depression     Diabetic neuropathy (HCC)     Diarrhea     Diarrhea     \"constant\" told she has colitis    Drug abuse ([address]     heroin quit 15 yrs ago    Eye injury     Fibromyalgia     Full dentures     Full dentures     upper and lower full    H/O degenerative disc disease     Hidradenitis suppurativa     Hypertension     IBS (irritable bowel syndrome)     Lipoma     lt breast    Lump of breast, left     Migraine     Mixed hyperlipidemia 2016    Numbness     bilateral legs    Snores     Type 2 diabetes mellitus without complication ([address]     lost 52 lbs off meds    Wears glasses        Past Surgical History:   Procedure Laterality Date    BREAST LUMPECTOMY Left      SECTION      LIPOMA RESECTION Left 01/15/2018    left chest wall    NERVE BLOCK Right 2016    RT MBNB #1   celestone 6mg    NERVE BLOCK Right 2016    right MBNB #2, kenalog 40mg    NERVE BLOCK Right 2017 right radiofrequency, kenalog 40    NERVE BLOCK Right 04/05/2017    right lumbar RF kenalog 40mg    NERVE BLOCK Left 08/11/2017    Lt MBNB #1  25% marcaine    ORTHOPEDIC SURGERY Bilateral     5 surgeries on each arm including carpal tunnel release lis    OTHER SURGICAL HISTORY Left 07/07/2016    Left Groin Hydradenitis    MA EXC SKIN BENIG 0.6-1CM FACE,FACIAL Left 1/15/2018    EXCISION LIPOMA CHEST WALL performed by Radha Khan DO at [facility]      spinal cord stimulator subsequently removed    UPPER GASTROINTESTINAL ENDOSCOPY  12-29-15       Family History   Problem Relation Age of Onset    Heart Disease Mother     Stroke Mother     High Blood Pressure Mother     Breast Cancer Mother     High Blood Pressure Father     Diabetes Father        Social History   Substance Use Topics    Smoking status: Current Some Day Smoker     Packs/day: 0.10     Years: 23.00     Types: Cigarettes     Last attempt to quit: 10/1/2015    Smokeless tobacco: Never Used    Alcohol use Yes      Comment: today      Current Outpatient Prescriptions   Medication Sig Dispense Refill    azithromycin (ZITHROMAX) 250 MG tablet Take 1 tablet by mouth See Admin Instructions for 5 days 500mg on day 1 followed by 250mg on days 2 - 5 6 tablet 0    dextromethorphan (DELSYM) 30 MG/5ML extended release liquid Take 10 mLs by mouth 2 times daily as needed for Cough 1 Bottle 1    DM-Benzocaine-Menthol (CEPASTAT) 5-6-10 MG LOZG Use every 4 hours as needed.  15 lozenge 1    gabapentin (NEURONTIN) 300 MG capsule take 2 capsules by mouth three times a day 180 capsule 0    cyclobenzaprine (FLEXERIL) 5 MG tablet take 1 tablet by mouth three times a day if needed for muscle spasm 90 tablet 2    acetaminophen (TYLENOL) 325 MG tablet Take 1 tablet by mouth every 6 hours as needed for Pain 120 tablet 0    ondansetron (ZOFRAN ODT) 4 MG disintegrating tablet Take 1 tablet by mouth every 8 hours as DTaP/Tdap/Td vaccine (1 - Tdap) 01/14/1996    Diabetic microalbuminuria test  05/06/2017    Lipid screen  06/09/2017    Diabetic foot exam  12/07/2017    Breast cancer screen  09/28/2018    Flu vaccine (1) 12/30/2018 (Originally 9/1/2018)    Cervical cancer screen  05/06/2019 (Originally 1/14/1998)    A1C test (Diabetic or Prediabetic)  06/04/2019    Pneumococcal med risk  Completed       Controlled Substances Monitoring: Attestation: The Prescription Monitoring Report for this patient was reviewed today. Garo Holly MD)    HPI:     Cough   This is a new problem. The current episode started in the past 7 days. The problem has been gradually worsening. Associated symptoms include chills, headaches and a sore throat. Risk factors for lung disease include smoking/tobacco exposure. She has tried OTC cough suppressant and OTC inhaler for the symptoms. Her past medical history is significant for COPD.       ROS:     Review of Systems   Constitutional: Positive for chills. HENT: Positive for congestion and sore throat. Eyes: Negative. Respiratory: Positive for cough. Gastrointestinal: Positive for abdominal pain, diarrhea, nausea and vomiting. Endocrine: Negative. Genitourinary: Negative. Musculoskeletal: Positive for back pain. Skin: Negative. Allergic/Immunologic: Negative. Neurological: Positive for dizziness, light-headedness and headaches. Hematological: Negative. Psychiatric/Behavioral: Negative. All other systems reviewed and are negative. Objective:     Physical Exam   Constitutional: She is oriented to person, place, and time. She appears well-developed and well-nourished. HENT:   Head: Normocephalic and atraumatic. Right Ear: External ear normal.   Left Ear: External ear normal.   Mouth/Throat: Oropharynx is clear and moist.   Neck: Neck supple.    Pulmonary/Chest: Effort normal and breath sounds normal.   Neurological: She is alert and oriented to person,

## 2019-01-04 ENCOUNTER — OFFICE VISIT (OUTPATIENT)
Dept: INTERNAL MEDICINE | Age: 44
End: 2019-01-04
Payer: MEDICARE

## 2019-01-04 ENCOUNTER — HOSPITAL ENCOUNTER (OUTPATIENT)
Age: 44
Setting detail: SPECIMEN
Discharge: HOME | End: 2019-01-04
Payer: MEDICARE

## 2019-01-04 VITALS
WEIGHT: 224 LBS | BODY MASS INDEX: 39.68 KG/M2 | DIASTOLIC BLOOD PRESSURE: 99 MMHG | HEART RATE: 100 BPM | SYSTOLIC BLOOD PRESSURE: 130 MMHG

## 2019-01-04 DIAGNOSIS — I10 ESSENTIAL HYPERTENSION: ICD-10-CM

## 2019-01-04 DIAGNOSIS — E11.9 TYPE 2 DIABETES MELLITUS WITHOUT COMPLICATION, WITHOUT LONG-TERM CURRENT USE OF INSULIN (HCC): ICD-10-CM

## 2019-01-04 DIAGNOSIS — Z11.4 SCREENING FOR HIV (HUMAN IMMUNODEFICIENCY VIRUS): ICD-10-CM

## 2019-01-04 DIAGNOSIS — M75.01 ADHESIVE CAPSULITIS OF RIGHT SHOULDER: ICD-10-CM

## 2019-01-04 DIAGNOSIS — G62.9 NEUROPATHY: ICD-10-CM

## 2019-01-04 DIAGNOSIS — E78.2 MIXED HYPERLIPIDEMIA: ICD-10-CM

## 2019-01-04 DIAGNOSIS — E11.9 TYPE 2 DIABETES MELLITUS WITHOUT COMPLICATION, WITHOUT LONG-TERM CURRENT USE OF INSULIN (HCC): Primary | ICD-10-CM

## 2019-01-04 DIAGNOSIS — F32.A DEPRESSION, UNSPECIFIED DEPRESSION TYPE: ICD-10-CM

## 2019-01-04 LAB
CHOLESTEROL/HDL RATIO: 4.8
CHOLESTEROL: 187 MG/DL
CREATININE URINE: 76 MG/DL (ref 28–217)
HBA1C MFR BLD: 6.7 %
HDLC SERPL-MCNC: 39 MG/DL
HIV AG/AB: NONREACTIVE
LDL CHOLESTEROL: 89 MG/DL (ref 0–130)
MICROALBUMIN/CREAT 24H UR: <12 MG/L
MICROALBUMIN/CREAT UR-RTO: NORMAL MCG/MG CREAT
TRIGL SERPL-MCNC: 296 MG/DL
VLDLC SERPL CALC-MCNC: ABNORMAL MG/DL (ref 1–30)

## 2019-01-04 PROCEDURE — G8417 CALC BMI ABV UP PARAM F/U: HCPCS | Performed by: STUDENT IN AN ORGANIZED HEALTH CARE EDUCATION/TRAINING PROGRAM

## 2019-01-04 PROCEDURE — 99211 OFF/OP EST MAY X REQ PHY/QHP: CPT | Performed by: INTERNAL MEDICINE

## 2019-01-04 PROCEDURE — 82570 ASSAY OF URINE CREATININE: CPT

## 2019-01-04 PROCEDURE — 2022F DILAT RTA XM EVC RTNOPTHY: CPT | Performed by: STUDENT IN AN ORGANIZED HEALTH CARE EDUCATION/TRAINING PROGRAM

## 2019-01-04 PROCEDURE — 4004F PT TOBACCO SCREEN RCVD TLK: CPT | Performed by: STUDENT IN AN ORGANIZED HEALTH CARE EDUCATION/TRAINING PROGRAM

## 2019-01-04 PROCEDURE — 82043 UR ALBUMIN QUANTITATIVE: CPT

## 2019-01-04 PROCEDURE — 3044F HG A1C LEVEL LT 7.0%: CPT | Performed by: STUDENT IN AN ORGANIZED HEALTH CARE EDUCATION/TRAINING PROGRAM

## 2019-01-04 PROCEDURE — 87389 HIV-1 AG W/HIV-1&-2 AB AG IA: CPT

## 2019-01-04 PROCEDURE — 80061 LIPID PANEL: CPT

## 2019-01-04 PROCEDURE — 83036 HEMOGLOBIN GLYCOSYLATED A1C: CPT | Performed by: STUDENT IN AN ORGANIZED HEALTH CARE EDUCATION/TRAINING PROGRAM

## 2019-01-04 PROCEDURE — G8427 DOCREV CUR MEDS BY ELIG CLIN: HCPCS | Performed by: STUDENT IN AN ORGANIZED HEALTH CARE EDUCATION/TRAINING PROGRAM

## 2019-01-04 PROCEDURE — 99213 OFFICE O/P EST LOW 20 MIN: CPT | Performed by: STUDENT IN AN ORGANIZED HEALTH CARE EDUCATION/TRAINING PROGRAM

## 2019-01-04 PROCEDURE — G8484 FLU IMMUNIZE NO ADMIN: HCPCS | Performed by: STUDENT IN AN ORGANIZED HEALTH CARE EDUCATION/TRAINING PROGRAM

## 2019-01-04 RX ORDER — GABAPENTIN 300 MG/1
600 CAPSULE ORAL 3 TIMES DAILY
Qty: 180 CAPSULE | Refills: 0 | Status: SHIPPED | OUTPATIENT
Start: 2019-01-04 | End: 2019-01-04 | Stop reason: SDUPTHER

## 2019-01-04 RX ORDER — AMLODIPINE BESYLATE 10 MG/1
10 TABLET ORAL DAILY
Qty: 30 TABLET | Refills: 5 | Status: SHIPPED | OUTPATIENT
Start: 2019-01-04 | End: 2019-01-04 | Stop reason: SDUPTHER

## 2019-01-04 RX ORDER — AMLODIPINE BESYLATE 10 MG/1
10 TABLET ORAL DAILY
Qty: 30 TABLET | Refills: 5 | Status: SHIPPED | OUTPATIENT
Start: 2019-01-04 | End: 2019-03-11 | Stop reason: SDUPTHER

## 2019-01-04 RX ORDER — GABAPENTIN 300 MG/1
600 CAPSULE ORAL 3 TIMES DAILY
Qty: 180 CAPSULE | Refills: 0 | Status: SHIPPED | OUTPATIENT
Start: 2019-01-04 | End: 2019-02-03

## 2019-01-06 ENCOUNTER — APPOINTMENT (OUTPATIENT)
Dept: GENERAL RADIOLOGY | Age: 44
End: 2019-01-06
Payer: MEDICARE

## 2019-01-06 ENCOUNTER — APPOINTMENT (OUTPATIENT)
Dept: CT IMAGING | Age: 44
End: 2019-01-06
Payer: MEDICARE

## 2019-01-06 ENCOUNTER — HOSPITAL ENCOUNTER (EMERGENCY)
Age: 44
Discharge: HOME | End: 2019-01-06
Attending: EMERGENCY MEDICINE
Payer: MEDICARE

## 2019-01-06 VITALS
TEMPERATURE: 99.7 F | DIASTOLIC BLOOD PRESSURE: 97 MMHG | RESPIRATION RATE: 18 BRPM | OXYGEN SATURATION: 96 % | BODY MASS INDEX: 38.09 KG/M2 | HEART RATE: 130 BPM | HEIGHT: 63 IN | WEIGHT: 215 LBS | SYSTOLIC BLOOD PRESSURE: 146 MMHG
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
## 2019-01-06 LAB
ABSOLUTE EOS #: 0.07 K/UL (ref 0–0.44)
ABSOLUTE IMMATURE GRANULOCYTE: <0.03 K/UL (ref 0–0.3)
ABSOLUTE LYMPH #: 1.93 K/UL (ref 1.1–3.7)
ABSOLUTE MONO #: 0.42 K/UL (ref 0.1–1.2)
ANION GAP SERPL CALCULATED.3IONS-SCNC: 20 MMOL/L (ref 9–17)
BASOPHILS # BLD: 1 % (ref 0–2)
BASOPHILS ABSOLUTE: 0.06 K/UL (ref 0–0.2)
BUN BLDV-MCNC: 6 MG/DL (ref 6–20)
BUN/CREAT BLD: ABNORMAL (ref 9–20)
CALCIUM SERPL-MCNC: 9 MG/DL (ref 8.6–10.4)
CHLORIDE BLD-SCNC: 107 MMOL/L (ref 98–107)
CO2: 19 MMOL/L (ref 20–31)
CREAT SERPL-MCNC: 1.12 MG/DL (ref 0.5–0.9)
DIFFERENTIAL TYPE: ABNORMAL
EOSINOPHILS RELATIVE PERCENT: 1 % (ref 1–4)
ETHANOL PERCENT: 0.16 %
ETHANOL: 161 MG/DL
GFR AFRICAN AMERICAN: >60 ML/MIN
GFR NON-AFRICAN AMERICAN: 54 ML/MIN
GFR SERPL CREATININE-BSD FRML MDRD: ABNORMAL ML/MIN/{1.73_M2}
GFR SERPL CREATININE-BSD FRML MDRD: ABNORMAL ML/MIN/{1.73_M2}
GLUCOSE BLD-MCNC: 186 MG/DL (ref 70–99)
HCG QUALITATIVE: NEGATIVE
HCT VFR BLD CALC: 36.8 % (ref 36.3–47.1)
HEMOGLOBIN: 11.8 G/DL (ref 11.9–15.1)
IMMATURE GRANULOCYTES: 0 %
LYMPHOCYTES # BLD: 23 % (ref 24–43)
MCH RBC QN AUTO: 26.2 PG (ref 25.2–33.5)
MCHC RBC AUTO-ENTMCNC: 32.1 G/DL (ref 28.4–34.8)
MCV RBC AUTO: 81.8 FL (ref 82.6–102.9)
MONOCYTES # BLD: 5 % (ref 3–12)
MYOGLOBIN: 127 NG/ML (ref 25–58)
NRBC AUTOMATED: 0 PER 100 WBC
PDW BLD-RTO: 15.5 % (ref 11.8–14.4)
PLATELET # BLD: 308 K/UL (ref 138–453)
PLATELET ESTIMATE: ABNORMAL
PMV BLD AUTO: 9.6 FL (ref 8.1–13.5)
POTASSIUM SERPL-SCNC: 3.6 MMOL/L (ref 3.7–5.3)
RBC # BLD: 4.5 M/UL (ref 3.95–5.11)
RBC # BLD: ABNORMAL 10*6/UL
SEG NEUTROPHILS: 70 % (ref 36–65)
SEGMENTED NEUTROPHILS ABSOLUTE COUNT: 5.91 K/UL (ref 1.5–8.1)
SODIUM BLD-SCNC: 146 MMOL/L (ref 135–144)
TOTAL CK: 218 U/L (ref 26–192)
WBC # BLD: 8.4 K/UL (ref 3.5–11.3)
WBC # BLD: ABNORMAL 10*3/UL

## 2019-01-06 PROCEDURE — 73562 X-RAY EXAM OF KNEE 3: CPT

## 2019-01-06 PROCEDURE — 72128 CT CHEST SPINE W/O DYE: CPT

## 2019-01-06 PROCEDURE — 74177 CT ABD & PELVIS W/CONTRAST: CPT

## 2019-01-06 PROCEDURE — 2580000003 HC RX 258: Performed by: EMERGENCY MEDICINE

## 2019-01-06 PROCEDURE — 85025 COMPLETE CBC W/AUTO DIFF WBC: CPT

## 2019-01-06 PROCEDURE — G0480 DRUG TEST DEF 1-7 CLASSES: HCPCS

## 2019-01-06 PROCEDURE — 83874 ASSAY OF MYOGLOBIN: CPT

## 2019-01-06 PROCEDURE — 70450 CT HEAD/BRAIN W/O DYE: CPT

## 2019-01-06 PROCEDURE — 6360000004 HC RX CONTRAST MEDICATION: Performed by: EMERGENCY MEDICINE

## 2019-01-06 PROCEDURE — 82550 ASSAY OF CK (CPK): CPT

## 2019-01-06 PROCEDURE — 80048 BASIC METABOLIC PNL TOTAL CA: CPT

## 2019-01-06 PROCEDURE — 71045 X-RAY EXAM CHEST 1 VIEW: CPT

## 2019-01-06 PROCEDURE — 72131 CT LUMBAR SPINE W/O DYE: CPT

## 2019-01-06 PROCEDURE — 6370000000 HC RX 637 (ALT 250 FOR IP): Performed by: EMERGENCY MEDICINE

## 2019-01-06 PROCEDURE — G0383 LEV 4 HOSP TYPE B ED VISIT: HCPCS

## 2019-01-06 PROCEDURE — 84703 CHORIONIC GONADOTROPIN ASSAY: CPT

## 2019-01-06 PROCEDURE — 72125 CT NECK SPINE W/O DYE: CPT

## 2019-01-06 RX ORDER — ACETAMINOPHEN 325 MG/1
650 TABLET ORAL EVERY 6 HOURS PRN
Qty: 40 TABLET | Refills: 0 | Status: SHIPPED | OUTPATIENT
Start: 2019-01-06 | End: 2019-02-03

## 2019-01-06 RX ORDER — ACETAMINOPHEN 500 MG
1000 TABLET ORAL ONCE
Status: COMPLETED | OUTPATIENT
Start: 2019-01-06 | End: 2019-01-06

## 2019-01-06 RX ORDER — 0.9 % SODIUM CHLORIDE 0.9 %
1000 INTRAVENOUS SOLUTION INTRAVENOUS ONCE
Status: COMPLETED | OUTPATIENT
Start: 2019-01-06 | End: 2019-01-06

## 2019-01-06 RX ORDER — ACETAMINOPHEN 500 MG
500 TABLET ORAL ONCE
Status: COMPLETED | OUTPATIENT
Start: 2019-01-06 | End: 2019-01-06

## 2019-01-06 RX ADMIN — ACETAMINOPHEN 500 MG: 500 TABLET ORAL at 02:37

## 2019-01-06 RX ADMIN — SODIUM CHLORIDE 1000 ML: 9 INJECTION, SOLUTION INTRAVENOUS at 01:30

## 2019-01-06 RX ADMIN — ACETAMINOPHEN 500 MG: 500 TABLET ORAL at 01:35

## 2019-01-06 RX ADMIN — IOVERSOL 75 ML: 741 INJECTION INTRA-ARTERIAL; INTRAVENOUS at 02:49

## 2019-01-06 RX ADMIN — SODIUM CHLORIDE 1000 ML: 9 INJECTION, SOLUTION INTRAVENOUS at 03:42

## 2019-01-06 ASSESSMENT — ENCOUNTER SYMPTOMS
RHINORRHEA: 0
SHORTNESS OF BREATH: 0
COUGH: 0
DIARRHEA: 0
BACK PAIN: 1
NAUSEA: 0
EYE PAIN: 0
ABDOMINAL PAIN: 1
VOMITING: 0
CONSTIPATION: 0

## 2019-01-06 ASSESSMENT — PAIN DESCRIPTION - FREQUENCY: FREQUENCY: CONTINUOUS

## 2019-01-06 ASSESSMENT — PAIN DESCRIPTION - PROGRESSION: CLINICAL_PROGRESSION: NOT CHANGED

## 2019-01-06 ASSESSMENT — PAIN DESCRIPTION - DESCRIPTORS: DESCRIPTORS: THROBBING;PRESSURE

## 2019-01-06 ASSESSMENT — PAIN SCALES - GENERAL
PAINLEVEL_OUTOF10: 7

## 2019-01-06 ASSESSMENT — PAIN DESCRIPTION - PAIN TYPE: TYPE: ACUTE PAIN

## 2019-01-06 ASSESSMENT — PAIN DESCRIPTION - LOCATION: LOCATION: GENERALIZED

## 2019-01-06 ASSESSMENT — PAIN DESCRIPTION - ONSET: ONSET: SUDDEN

## 2019-01-15 ENCOUNTER — HOSPITAL ENCOUNTER (EMERGENCY)
Age: 44
Discharge: HOME | End: 2019-01-15
Attending: EMERGENCY MEDICINE
Payer: MEDICARE

## 2019-01-15 ENCOUNTER — APPOINTMENT (OUTPATIENT)
Dept: CT IMAGING | Age: 44
End: 2019-01-15
Payer: MEDICARE

## 2019-01-15 VITALS
BODY MASS INDEX: 37.21 KG/M2 | HEART RATE: 122 BPM | SYSTOLIC BLOOD PRESSURE: 148 MMHG | RESPIRATION RATE: 16 BRPM | DIASTOLIC BLOOD PRESSURE: 102 MMHG | HEIGHT: 63 IN | OXYGEN SATURATION: 97 % | TEMPERATURE: 98.2 F | WEIGHT: 210 LBS

## 2019-01-15 DIAGNOSIS — R51.9 ACUTE NONINTRACTABLE HEADACHE, UNSPECIFIED HEADACHE TYPE: Primary | ICD-10-CM

## 2019-01-15 DIAGNOSIS — S09.90XA HEAD TRAUMA, INITIAL ENCOUNTER: ICD-10-CM

## 2019-01-15 PROCEDURE — 99284 EMERGENCY DEPT VISIT MOD MDM: CPT

## 2019-01-15 PROCEDURE — 72125 CT NECK SPINE W/O DYE: CPT

## 2019-01-15 PROCEDURE — 6370000000 HC RX 637 (ALT 250 FOR IP): Performed by: EMERGENCY MEDICINE

## 2019-01-15 PROCEDURE — 70450 CT HEAD/BRAIN W/O DYE: CPT

## 2019-01-15 RX ORDER — ACETAMINOPHEN 325 MG/1
650 TABLET ORAL ONCE
Status: COMPLETED | OUTPATIENT
Start: 2019-01-15 | End: 2019-01-15

## 2019-01-15 RX ADMIN — ACETAMINOPHEN 650 MG: 325 TABLET ORAL at 01:29

## 2019-01-15 ASSESSMENT — ENCOUNTER SYMPTOMS
ALLERGIC/IMMUNOLOGIC NEGATIVE: 1
EYES NEGATIVE: 1
RESPIRATORY NEGATIVE: 1
GASTROINTESTINAL NEGATIVE: 1

## 2019-01-15 ASSESSMENT — PAIN DESCRIPTION - PAIN TYPE: TYPE: ACUTE PAIN

## 2019-01-15 ASSESSMENT — PAIN DESCRIPTION - DESCRIPTORS: DESCRIPTORS: THROBBING

## 2019-01-15 ASSESSMENT — PAIN DESCRIPTION - LOCATION: LOCATION: HEAD

## 2019-01-15 ASSESSMENT — PAIN SCALES - GENERAL
PAINLEVEL_OUTOF10: 8
PAINLEVEL_OUTOF10: 8

## 2019-02-03 ENCOUNTER — APPOINTMENT (OUTPATIENT)
Dept: GENERAL RADIOLOGY | Age: 44
End: 2019-02-03
Payer: MEDICARE

## 2019-02-03 ENCOUNTER — HOSPITAL ENCOUNTER (EMERGENCY)
Age: 44
Discharge: HOME | End: 2019-02-03
Attending: EMERGENCY MEDICINE
Payer: MEDICARE

## 2019-02-03 VITALS
BODY MASS INDEX: 38.97 KG/M2 | HEART RATE: 114 BPM | OXYGEN SATURATION: 94 % | SYSTOLIC BLOOD PRESSURE: 115 MMHG | TEMPERATURE: 97.3 F | WEIGHT: 220 LBS | DIASTOLIC BLOOD PRESSURE: 78 MMHG | RESPIRATION RATE: 14 BRPM

## 2019-02-03 DIAGNOSIS — S93.401A SPRAIN OF RIGHT ANKLE, UNSPECIFIED LIGAMENT, INITIAL ENCOUNTER: Primary | ICD-10-CM

## 2019-02-03 PROCEDURE — 6370000000 HC RX 637 (ALT 250 FOR IP): Performed by: STUDENT IN AN ORGANIZED HEALTH CARE EDUCATION/TRAINING PROGRAM

## 2019-02-03 PROCEDURE — 73610 X-RAY EXAM OF ANKLE: CPT

## 2019-02-03 PROCEDURE — 99283 EMERGENCY DEPT VISIT LOW MDM: CPT

## 2019-02-03 PROCEDURE — 73630 X-RAY EXAM OF FOOT: CPT

## 2019-02-03 RX ORDER — ACETAMINOPHEN 325 MG/1
325 TABLET ORAL EVERY 6 HOURS PRN
Qty: 120 TABLET | Refills: 0 | Status: SHIPPED | OUTPATIENT
Start: 2019-02-03 | End: 2019-10-17 | Stop reason: ALTCHOICE

## 2019-02-03 RX ORDER — ACETAMINOPHEN 500 MG
1000 TABLET ORAL ONCE
Status: COMPLETED | OUTPATIENT
Start: 2019-02-03 | End: 2019-02-03

## 2019-02-03 RX ADMIN — ACETAMINOPHEN 1000 MG: 500 TABLET ORAL at 01:03

## 2019-02-03 ASSESSMENT — PAIN DESCRIPTION - FREQUENCY: FREQUENCY: CONTINUOUS

## 2019-02-03 ASSESSMENT — ENCOUNTER SYMPTOMS: SHORTNESS OF BREATH: 0

## 2019-02-03 ASSESSMENT — PAIN DESCRIPTION - PAIN TYPE: TYPE: ACUTE PAIN

## 2019-02-03 ASSESSMENT — PAIN DESCRIPTION - DESCRIPTORS: DESCRIPTORS: ACHING;SORE

## 2019-02-03 ASSESSMENT — PAIN DESCRIPTION - LOCATION: LOCATION: ANKLE

## 2019-02-03 ASSESSMENT — PAIN SCALES - GENERAL
PAINLEVEL_OUTOF10: 8
PAINLEVEL_OUTOF10: 8

## 2019-02-03 ASSESSMENT — PAIN DESCRIPTION - ORIENTATION: ORIENTATION: RIGHT

## 2019-02-14 ENCOUNTER — OFFICE VISIT (OUTPATIENT)
Dept: INTERNAL MEDICINE | Age: 44
End: 2019-02-14
Payer: MEDICARE

## 2019-02-14 VITALS
DIASTOLIC BLOOD PRESSURE: 94 MMHG | WEIGHT: 223 LBS | TEMPERATURE: 97.8 F | BODY MASS INDEX: 39.5 KG/M2 | HEART RATE: 104 BPM | SYSTOLIC BLOOD PRESSURE: 138 MMHG

## 2019-02-14 DIAGNOSIS — J20.9 ACUTE BRONCHITIS, UNSPECIFIED ORGANISM: Primary | ICD-10-CM

## 2019-02-14 DIAGNOSIS — I10 ESSENTIAL HYPERTENSION: ICD-10-CM

## 2019-02-14 DIAGNOSIS — J42 CHRONIC BRONCHITIS, UNSPECIFIED CHRONIC BRONCHITIS TYPE (HCC): ICD-10-CM

## 2019-02-14 DIAGNOSIS — F17.200 SMOKER: ICD-10-CM

## 2019-02-14 DIAGNOSIS — M62.838 MUSCLE SPASM: ICD-10-CM

## 2019-02-14 DIAGNOSIS — E11.42 TYPE 2 DIABETES MELLITUS WITH DIABETIC POLYNEUROPATHY, WITHOUT LONG-TERM CURRENT USE OF INSULIN (HCC): ICD-10-CM

## 2019-02-14 DIAGNOSIS — G47.33 OSA (OBSTRUCTIVE SLEEP APNEA): ICD-10-CM

## 2019-02-14 DIAGNOSIS — M17.11 OSTEOARTHRITIS OF RIGHT KNEE, UNSPECIFIED OSTEOARTHRITIS TYPE: ICD-10-CM

## 2019-02-14 PROCEDURE — 99211 OFF/OP EST MAY X REQ PHY/QHP: CPT | Performed by: INTERNAL MEDICINE

## 2019-02-14 PROCEDURE — 3044F HG A1C LEVEL LT 7.0%: CPT | Performed by: INTERNAL MEDICINE

## 2019-02-14 PROCEDURE — G8417 CALC BMI ABV UP PARAM F/U: HCPCS | Performed by: INTERNAL MEDICINE

## 2019-02-14 PROCEDURE — G8484 FLU IMMUNIZE NO ADMIN: HCPCS | Performed by: INTERNAL MEDICINE

## 2019-02-14 PROCEDURE — 2022F DILAT RTA XM EVC RTNOPTHY: CPT | Performed by: INTERNAL MEDICINE

## 2019-02-14 PROCEDURE — 3023F SPIROM DOC REV: CPT | Performed by: INTERNAL MEDICINE

## 2019-02-14 PROCEDURE — 99214 OFFICE O/P EST MOD 30 MIN: CPT | Performed by: INTERNAL MEDICINE

## 2019-02-14 PROCEDURE — 4004F PT TOBACCO SCREEN RCVD TLK: CPT | Performed by: INTERNAL MEDICINE

## 2019-02-14 PROCEDURE — G8926 SPIRO NO PERF OR DOC: HCPCS | Performed by: INTERNAL MEDICINE

## 2019-02-14 PROCEDURE — G8427 DOCREV CUR MEDS BY ELIG CLIN: HCPCS | Performed by: INTERNAL MEDICINE

## 2019-02-14 RX ORDER — LANCETS 30 GAUGE
1 EACH MISCELLANEOUS 2 TIMES DAILY
Qty: 100 EACH | Refills: 5 | Status: SHIPPED | OUTPATIENT
Start: 2019-02-14 | End: 2019-04-15 | Stop reason: SDUPTHER

## 2019-02-14 RX ORDER — PREDNISONE 20 MG/1
20 TABLET ORAL 2 TIMES DAILY
Qty: 10 TABLET | Refills: 0 | Status: SHIPPED | OUTPATIENT
Start: 2019-02-14 | End: 2019-02-19

## 2019-02-14 RX ORDER — BENZONATATE 100 MG/1
100 CAPSULE ORAL 2 TIMES DAILY PRN
Qty: 20 CAPSULE | Refills: 0 | Status: SHIPPED | OUTPATIENT
Start: 2019-02-14 | End: 2019-02-21

## 2019-02-14 RX ORDER — ALBUTEROL SULFATE 90 UG/1
2 AEROSOL, METERED RESPIRATORY (INHALATION) EVERY 6 HOURS PRN
Qty: 2 INHALER | Refills: 3 | Status: SHIPPED | OUTPATIENT
Start: 2019-02-14 | End: 2019-04-15 | Stop reason: SDUPTHER

## 2019-02-14 RX ORDER — AZITHROMYCIN 250 MG/1
250 TABLET, FILM COATED ORAL SEE ADMIN INSTRUCTIONS
Qty: 6 TABLET | Refills: 0 | Status: SHIPPED | OUTPATIENT
Start: 2019-02-14 | End: 2019-02-19

## 2019-02-14 RX ORDER — GABAPENTIN 300 MG/1
CAPSULE ORAL
Qty: 180 CAPSULE | Refills: 1 | Status: SHIPPED | OUTPATIENT
Start: 2019-02-14 | End: 2019-04-10 | Stop reason: SDUPTHER

## 2019-02-14 ASSESSMENT — ENCOUNTER SYMPTOMS: COUGH: 1

## 2019-02-15 RX ORDER — CYCLOBENZAPRINE HCL 5 MG
TABLET ORAL
Qty: 90 TABLET | Refills: 2 | Status: SHIPPED | OUTPATIENT
Start: 2019-02-15 | End: 2019-04-15 | Stop reason: SDUPTHER

## 2019-02-17 ASSESSMENT — ENCOUNTER SYMPTOMS
CONSTIPATION: 0
SINUS PAIN: 0
SINUS PRESSURE: 0
BLOOD IN STOOL: 0
TROUBLE SWALLOWING: 0
RHINORRHEA: 1
SORE THROAT: 0
ABDOMINAL PAIN: 0
WHEEZING: 0
BACK PAIN: 0
SHORTNESS OF BREATH: 0

## 2019-03-01 ENCOUNTER — HOSPITAL ENCOUNTER (OUTPATIENT)
Dept: PULMONOLOGY | Age: 44
Discharge: HOME | End: 2019-03-01
Payer: COMMERCIAL

## 2019-03-01 DIAGNOSIS — J42 CHRONIC BRONCHITIS, UNSPECIFIED CHRONIC BRONCHITIS TYPE (HCC): ICD-10-CM

## 2019-03-01 PROCEDURE — 94250 HC DIFFUSION: CPT

## 2019-03-01 PROCEDURE — 94664 DEMO&/EVAL PT USE INHALER: CPT

## 2019-03-01 PROCEDURE — 94640 AIRWAY INHALATION TREATMENT: CPT

## 2019-03-01 PROCEDURE — 94727 GAS DIL/WSHOT DETER LNG VOL: CPT

## 2019-03-01 PROCEDURE — 94726 PLETHYSMOGRAPHY LUNG VOLUMES: CPT

## 2019-03-01 PROCEDURE — 94060 EVALUATION OF WHEEZING: CPT

## 2019-03-22 DIAGNOSIS — K58.0 IRRITABLE BOWEL SYNDROME WITH DIARRHEA: ICD-10-CM

## 2019-03-22 DIAGNOSIS — M17.11 OSTEOARTHRITIS OF RIGHT KNEE, UNSPECIFIED OSTEOARTHRITIS TYPE: ICD-10-CM

## 2019-03-25 RX ORDER — DICYCLOMINE HCL 20 MG
20 TABLET ORAL 3 TIMES DAILY PRN
Qty: 120 TABLET | Refills: 3 | Status: SHIPPED | OUTPATIENT
Start: 2019-03-25 | End: 2019-10-17 | Stop reason: SDUPTHER

## 2019-04-10 ENCOUNTER — OFFICE VISIT (OUTPATIENT)
Dept: INTERNAL MEDICINE | Age: 44
End: 2019-04-10
Payer: COMMERCIAL

## 2019-04-10 VITALS
SYSTOLIC BLOOD PRESSURE: 120 MMHG | DIASTOLIC BLOOD PRESSURE: 86 MMHG | HEART RATE: 96 BPM | BODY MASS INDEX: 40.57 KG/M2 | WEIGHT: 229 LBS

## 2019-04-10 DIAGNOSIS — E66.01 MORBID OBESITY WITH BMI OF 40.0-44.9, ADULT (HCC): ICD-10-CM

## 2019-04-10 DIAGNOSIS — E11.42 TYPE 2 DIABETES MELLITUS WITH DIABETIC POLYNEUROPATHY, WITHOUT LONG-TERM CURRENT USE OF INSULIN (HCC): Primary | ICD-10-CM

## 2019-04-10 DIAGNOSIS — E78.2 MIXED HYPERLIPIDEMIA: ICD-10-CM

## 2019-04-10 DIAGNOSIS — M17.11 OSTEOARTHRITIS OF RIGHT KNEE, UNSPECIFIED OSTEOARTHRITIS TYPE: ICD-10-CM

## 2019-04-10 DIAGNOSIS — G47.33 OSA (OBSTRUCTIVE SLEEP APNEA): ICD-10-CM

## 2019-04-10 DIAGNOSIS — M67.911 ROTATOR CUFF DYSFUNCTION, RIGHT: ICD-10-CM

## 2019-04-10 DIAGNOSIS — J42 CHRONIC BRONCHITIS, UNSPECIFIED CHRONIC BRONCHITIS TYPE (HCC): ICD-10-CM

## 2019-04-10 DIAGNOSIS — I10 ESSENTIAL HYPERTENSION: ICD-10-CM

## 2019-04-10 PROCEDURE — 99214 OFFICE O/P EST MOD 30 MIN: CPT | Performed by: INTERNAL MEDICINE

## 2019-04-10 PROCEDURE — 99211 OFF/OP EST MAY X REQ PHY/QHP: CPT | Performed by: INTERNAL MEDICINE

## 2019-04-10 RX ORDER — GABAPENTIN 300 MG/1
CAPSULE ORAL
Qty: 180 CAPSULE | Refills: 1 | Status: SHIPPED | OUTPATIENT
Start: 2019-04-10 | End: 2019-06-08 | Stop reason: SDUPTHER

## 2019-04-10 RX ORDER — AMLODIPINE BESYLATE 10 MG/1
10 TABLET ORAL DAILY
Qty: 30 TABLET | Refills: 2 | Status: SHIPPED | OUTPATIENT
Start: 2019-04-10 | End: 2019-08-30 | Stop reason: SDUPTHER

## 2019-04-10 RX ORDER — FLUTICASONE PROPIONATE 110 UG/1
2 AEROSOL, METERED RESPIRATORY (INHALATION) 2 TIMES DAILY
Qty: 1 INHALER | Refills: 3 | Status: SHIPPED | OUTPATIENT
Start: 2019-04-10 | End: 2019-08-03 | Stop reason: SDUPTHER

## 2019-04-10 NOTE — PROGRESS NOTES
Visit Information    Have you changed or started any medications since your last visit including any over-the-counter medicines, vitamins, or herbal medicines? no   Are you having any side effects from any of your medications? -  no  Have you stopped taking any of your medications? Is so, why? -  no    Have you seen any other physician or provider since your last visit? No  Have you had any other diagnostic tests since your last visit? Yes - Records Obtained  Have you been seen in the emergency room and/or had an admission to a hospital since we last saw you? No  Have you had your routine dental cleaning in the past 6 months? yes -     Have you activated your [employer] account? If not, what are your barriers?  Yes     Patient Care Team:  Miriam Morrell MD as PCP - General (Internal Medicine)  HALLEY Bustos - CNP as Nurse Practitioner (Pain Management)  Benita Baez DO as Consulting Physician (General Surgery)    Medical History Review  Past Medical, Family, and Social History reviewed and does contribute to the patient presenting condition    Health Maintenance   Topic Date Due    Breast cancer screen  09/28/2018    DTaP/Tdap/Td vaccine (1 - Tdap) 05/01/2019 (Originally 1/14/1996)    Cervical cancer screen  05/06/2019 (Originally 1/14/1998)    Flu vaccine (Season Ended) 09/04/2019 (Originally 9/1/2019)    Hepatitis B Vaccine (1 of 3 - Risk 3-dose series) 03/25/2020 (Originally 1/14/1996)    Diabetic foot exam  01/04/2020    A1C test (Diabetic or Prediabetic)  01/04/2020    Diabetic microalbuminuria test  01/04/2020    Lipid screen  01/04/2020    Potassium monitoring  01/06/2020    Creatinine monitoring  01/06/2020    Diabetic retinal exam  02/04/2020    Pneumococcal 0-64 years Vaccine  Completed    HIV screen  Completed

## 2019-04-10 NOTE — PATIENT INSTRUCTIONS
Medications e-scribe to pharmacy of pt's choice. Referral for Ortho sent to [facility]  they will call pt for appt, copy of referral with number and address given to pt. Pt should call referral number if not heard from within a couple of weeks. Order for MRI faxed to [address] [place] [address] they will call pt for appt. Please call [phone] in not heard within 2 weeks. Script for lab given to pt, no fasting required. Pt will get labs done before next appt. An After Visit Summary was printed and given to the patient. CB    LABORATORY INSTRUCTIONS    Your doctor has ordered blood or urine testing. You can get this testing done at the Lab located on the first floor of the [facility], or at any other [facility]. Please stop at Main Registration, before going to the lab, as you must be registered first.     Please get this lab done before your next visit. You may eat or drink before this test.     TESTING INSTRUCTIONS    Your doctor has ordered a/an MRI for you, this will be done at any [facility] location of your choice    You will be called by the Scheduling Department to schedule your testing. If you do not hear from them within a week, please call them at [phone] to schedule the appointment. On the day of your appointment, please report to the Admitting Department, located on the main floor of the medical [place] behind the information desk. If you cannot keep this appointment, please call [phone] to cancel and reschedule your appointment.

## 2019-04-10 NOTE — PROGRESS NOTES
[facility]    Progress Note    Date of patient's visit: 4/10/2019    Patient's Name:  Americo Burger    YOB: 1975            Patient Care Team:  James Uriarte MD as PCP - General (Internal Medicine)  HALLEY Tarango - CNP as Nurse Practitioner (Pain Management)  Marce Sams DO as Consulting Physician (General Surgery)    REASON FOR VISIT: Routine outpatient follow     Chief Complaint   Patient presents with    Results     Pt has PFt on 3/7/19    Shoulder Pain     Pt c/o having R shoulder pain, causing hand and finger swelling, can not lift more than shoulder height. can not rotate         HISTORY OF PRESENT ILLNESS:    History was obtained from the patient. Americo Burger is a 45 y.o. is here for follow-up and to go over PFTs. She's has chronic cough with some minimal daily expectoration. She is using Spiriva which does help. She needs to use albuterol 2 or 3 times a day. She also says she had asthma growing up. She continues to smoke but has cut down to 2 cigarettes a day. She is complaining of right shoulder pain. It hasn't improved and range of motion is worsening. She fell last year and hurt her right shoulder. She went to therapy twice but stopped going because of pain. She is following up with a psychiatrist regularly. She takes several medications. \  She says she lost her CPAP recently as she could not afford to pay money on the storage unit. She has been unable to use her CPAP now for the last 3 months. She states the last sleep study was done him approximately a year ago but I cannot find the reports. Will call [facility].      PFT's 2019    IMPRESSION:  This pulmonary function test shows normal spirometry  although there is mild reduction in FEV1/FVC which could be sensitive of  mild obstruction. There was no significant response to bronchodilator  but clinical response is still possible. Lung volumes were normal and  diffusion capacity was normal.  Clinical correlation is recommended.        NM stress test 2016      Impression   1. Normal study. 2. No evidence for left ventricular stress-induced ischemia. 3. No left ventricular wall infarct. 4. Left ventricular ejection fraction of 60%.         MRI right knee 2018      FINDINGS:   MENISCI: Medial and lateral menisci are intact.       CRUCIATE LIGAMENTS: Anterior cruciate ligament and posterior cruciate   ligament are normal.       EXTENSOR MECHANISM: Quadriceps tendon and patellar tendon are intact.       LATERAL COLLATERAL LIGAMENT COMPLEX: True lateral collateral ligament,   iliotibial band, biceps femoris tendon are intact.  Popliteus muscle and   tendon are intact.       MEDIAL COLLATERAL LIGAMENT COMPLEX: Medial collateral ligament is intact. Semimembranous tendon is intact.       KNEE JOINT: Hyaline cartilage is preserved along the articular surfaces. Physiologic amount of fluid is present in the knee joint.       BONE MARROW: Normal on all sequences.           Impression   Normal right knee MRI.          Right shoulder xray 2018      FINDINGS:   Glenohumeral joint is normally aligned.  No evidence of acute fracture or   dislocation.  No abnormal periarticular calcifications.  The AC joint is   unremarkable in appearance.       Visualized lung is unremarkable.           Impression   No acute abnormality.                              Past Medical History:   Diagnosis Date    Alcohol abuse     quit 15 yrs ago, recent relapse.      Anxiety     Arthritis     Asthma     Back pain, chronic     Cerebral artery occlusion with cerebral infarction (HCC)     tia    Colitis     Depression     Diabetic neuropathy (HCC)     Diarrhea     Diarrhea     \"constant\" told she has colitis    Drug abuse ([address]     heroin quit 15 yrs ago    Eye injury     Fibromyalgia     Full dentures     Full dentures     upper and lower full    H/O degenerative disc disease     Hidradenitis suppurativa     Hypertension     IBS (irritable bowel syndrome)     Lipoma     lt breast    Lump of breast, left     Migraine     Mixed hyperlipidemia 2016    Numbness     bilateral legs    Snores     Type 2 diabetes mellitus without complication ([facility] [address]     lost 52 lbs off meds    Wears glasses        Past Surgical History:   Procedure Laterality Date    BREAST LUMPECTOMY Left      SECTION      LIPOMA RESECTION Left 01/15/2018    left chest wall    NERVE BLOCK Right 2016    RT MBNB #1   celestone 6mg    NERVE BLOCK Right 2016    right MBNB #2, kenalog 40mg    NERVE BLOCK Right 2017    right radiofrequency, kenalog 40    NERVE BLOCK Right 2017    right lumbar RF kenalog 40mg    NERVE BLOCK Left 2017    Lt MBNB #1  25% marcaine    ORTHOPEDIC SURGERY Bilateral     5 surgeries on each arm including carpal tunnel release lis    OTHER SURGICAL HISTORY Left 2016    Left Groin Hydradenitis    GA EXC SKIN BENIG 0.6-1CM FACE,FACIAL Left 1/15/2018    EXCISION LIPOMA CHEST WALL performed by Kena [facility]uniga DO at [facility]      spinal cord stimulator subsequently removed    UPPER GASTROINTESTINAL ENDOSCOPY  12-29-15         ALLERGIES      Allergies   Allergen Reactions    Dye [Barium-Containing Compounds] Itching    Nsaids      bleeding    Demerol      Pt states wants demeraol but writer further interviewed pt and states itching very severe; held and wasted    Pcn [Penicillins]     Tramadol        MEDICATIONS:      Current Outpatient Medications on File Prior to Visit   Medication Sig Dispense Refill    dicyclomine (BENTYL) 20 MG tablet Take 1 tablet by mouth 3 times daily as needed (cramping) 120 tablet 3    diclofenac sodium (VOLTAREN) 1 % GEL Apply 4 g topically 4 times daily 5 Tube 0    metFORMIN (GLUCOPHAGE) 500 MG tablet Take 1 tablet by mouth 2 times daily (with meals) 60 tablet 3    amLODIPine (NORVASC) 10 MG tablet Take 1 tablet by mouth daily 30 tablet 2    cyclobenzaprine (FLEXERIL) 5 MG tablet take 1 tablet by mouth three times a day if needed for muscle spasm 90 tablet 2    tiotropium (SPIRIVA HANDIHALER) 18 MCG inhalation capsule Inhale 1 capsule into the lungs daily 30 capsule 5    gabapentin (NEURONTIN) 300 MG capsule take 2 capsules by mouth three times a day. 180 capsule 1    Lancets MISC 1 each by Does not apply route 2 times daily 100 each 5    albuterol sulfate HFA (PROAIR HFA) 108 (90 Base) MCG/ACT inhaler Inhale 2 puffs into the lungs every 6 hours as needed for Wheezing 2 Inhaler 3    acetaminophen (TYLENOL) 325 MG tablet Take 1 tablet by mouth every 6 hours as needed for Pain 120 tablet 0    ondansetron (ZOFRAN ODT) 4 MG disintegrating tablet Take 1 tablet by mouth every 8 hours as needed for Nausea 10 tablet 0    escitalopram (LEXAPRO) 10 MG tablet Take 1 tablet by mouth daily 30 tablet 0    amphetamine-dextroamphetamine (ADDERALL XR) 20 MG extended release capsule take 1 capsule by mouth every morning  0    busPIRone (BUSPAR) 30 MG tablet Take 1 tablet by mouth 2 times daily  0    QUEtiapine (SEROQUEL XR) 300 MG XR tablet Take 1 tablet by mouth nightly 30 tablet 1     No current facility-administered medications on file prior to visit. SOCIAL HISTORY    Reviewed and no change from previous record. Lisa Mcghee  reports that she has been smoking cigarettes. She has a 2.30 pack-year smoking history. She has never used smokeless tobacco.    FAMILY HISTORY:    Reviewed and No change from previous visit    HEALTH MAINTENANCE DUE:      Health Maintenance Due   Topic Date Due    Breast cancer screen  09/28/2018       REVIEW OF SYSTEMS:    12 point review of symptoms completed and found to be normal except noted in the HPI    Review of Systems   Constitutional: Negative for chills, diaphoresis, fatigue and fever.    HENT: Positive for Flovent  On spiriva    - fluticasone (FLOVENT HFA) 110 MCG/ACT inhaler; Inhale 2 puffs into the lungs 2 times daily  Dispense: 1 Inhaler; Refill: 3    5. Mixed hyperlipidemia  Mild  May need statins as she has DM  Low fat diet  Add next visit    6. ALVA (obstructive sleep apnea)  Not using cpap  Advised to call cpap provider company if new prescription needed  Get sleep study report    7. Morbid obesity with BMI of 40.0-44.9, adult (HCC)  Diet changes    8. Osteoarthritis of right knee, unspecified osteoarthritis type    - gabapentin (NEURONTIN) 300 MG capsule; take 2 capsules by mouth three times a day  Dispense: 180 capsule; Refill: 1          FOLLOW UP AND INSTRUCTIONS:   Return in about 6 months (around 10/10/2019). 1. Alisa received counseling on the following healthy behaviors: nutrition, exercise and medication adherence    2. Reviewed prior labs and health maintenance. 3. Discussed use, benefit, and side effects of prescribed medications. Barriers to medication compliance addressed. All patient questions answered. Pt voiced understanding.        Boubacar Caban  Attending Physician, [address] [place] [address], Internal Medicine Residency Program  [address] [place] [address]  4/10/2019, 2:25 PM

## 2019-04-14 ASSESSMENT — ENCOUNTER SYMPTOMS
BLOOD IN STOOL: 0
WHEEZING: 0
PHOTOPHOBIA: 0
COUGH: 1
RHINORRHEA: 0
ABDOMINAL PAIN: 0
CONSTIPATION: 0
DIARRHEA: 0
SHORTNESS OF BREATH: 0

## 2019-04-15 DIAGNOSIS — E11.42 TYPE 2 DIABETES MELLITUS WITH DIABETIC POLYNEUROPATHY, WITHOUT LONG-TERM CURRENT USE OF INSULIN (HCC): ICD-10-CM

## 2019-04-15 DIAGNOSIS — J42 CHRONIC BRONCHITIS, UNSPECIFIED CHRONIC BRONCHITIS TYPE (HCC): ICD-10-CM

## 2019-04-15 DIAGNOSIS — M62.838 MUSCLE SPASM: ICD-10-CM

## 2019-04-16 RX ORDER — CYCLOBENZAPRINE HCL 5 MG
5 TABLET ORAL 3 TIMES DAILY PRN
Qty: 90 TABLET | Refills: 2 | Status: SHIPPED | OUTPATIENT
Start: 2019-04-16 | End: 2019-07-06 | Stop reason: SDUPTHER

## 2019-04-16 RX ORDER — LANCETS 30 GAUGE
1 EACH MISCELLANEOUS 2 TIMES DAILY
Qty: 100 EACH | Refills: 5 | Status: SHIPPED | OUTPATIENT
Start: 2019-04-16 | End: 2019-10-17 | Stop reason: SDUPTHER

## 2019-04-16 RX ORDER — ALBUTEROL SULFATE 90 UG/1
2 AEROSOL, METERED RESPIRATORY (INHALATION) EVERY 6 HOURS PRN
Qty: 2 INHALER | Refills: 3 | Status: SHIPPED | OUTPATIENT
Start: 2019-04-16 | End: 2020-01-16 | Stop reason: SDUPTHER

## 2019-04-17 DIAGNOSIS — E11.9 TYPE 2 DIABETES MELLITUS WITHOUT COMPLICATION, WITHOUT LONG-TERM CURRENT USE OF INSULIN (HCC): Primary | ICD-10-CM

## 2019-04-18 ENCOUNTER — OFFICE VISIT (OUTPATIENT)
Dept: ORTHOPEDIC SURGERY | Age: 44
End: 2019-04-18
Payer: COMMERCIAL

## 2019-04-18 VITALS — WEIGHT: 229.06 LBS | BODY MASS INDEX: 40.59 KG/M2 | HEIGHT: 63 IN

## 2019-04-18 DIAGNOSIS — M25.511 ACUTE PAIN OF RIGHT SHOULDER: Primary | ICD-10-CM

## 2019-04-18 PROCEDURE — 99203 OFFICE O/P NEW LOW 30 MIN: CPT | Performed by: ORTHOPAEDIC SURGERY

## 2019-04-18 RX ORDER — DIPHENHYDRAMINE HYDROCHLORIDE 25 MG/1
CAPSULE, LIQUID FILLED ORAL
Qty: 1 KIT | Refills: 0 | Status: SHIPPED | OUTPATIENT
Start: 2019-04-18 | End: 2019-10-17

## 2019-04-18 RX ORDER — GLUCOSAMINE HCL/CHONDROITIN SU 500-400 MG
CAPSULE ORAL
Qty: 100 STRIP | Refills: 11 | Status: SHIPPED | OUTPATIENT
Start: 2019-04-18

## 2019-04-18 RX ORDER — LANCETS 30 GAUGE
1 EACH MISCELLANEOUS 2 TIMES DAILY
Qty: 100 EACH | Refills: 5 | Status: SHIPPED | OUTPATIENT
Start: 2019-04-18 | End: 2019-10-17 | Stop reason: SDUPTHER

## 2019-04-18 ASSESSMENT — ENCOUNTER SYMPTOMS
BACK PAIN: 0
SHORTNESS OF BREATH: 0
WHEEZING: 0

## 2019-04-18 NOTE — PROGRESS NOTES
[facility]  [facility]  [address] [place], [place] [address]y [address] [address]  Dept: [phone]    Ambulatory Orthopedic Consult      CHIEF COMPLAINT:    Chief Complaint   Patient presents with    Shoulder Pain     right        HISTORY OF PRESENT ILLNESS:      The patient is a 45 y.o. female who is being seen at the request of  Danay Ojeda MD for consultation and evaluation of  Right shoulder pain. Gladys Almonte  is a 45 y.o. Right hand dominant  female who has had right shoulder pain for 6 month(s). The patient has any trauma to the shoulder. The pain is  worse at night and when doing overhead activities. Weakness of the shoulder has been noted. The pain restricts activities such as washing dishes and getting dressed. The pain has not improved with time. The pain is  exacerbated at night. The patient does notice loss in ROM of the shoulder. The pain is improved with nothing. Physical therapy has been tried. Corticosteriod injection has not been administered. There has not been previous history of surgery performed on the shoulder. The patient states that in October she fell and her back pack swung her right shoulder around and then she attempted PT but it was too painful. She notes another fall in January that made the pain in the right shoulder worse. She states she has been to 3 or 4 physical therapy appointments. She is currently applying Voltaren gel and tylenol without relief. Past Medical History:    Past Medical History:   Diagnosis Date    Alcohol abuse     quit 15 yrs ago, recent relapse.      Anxiety     Arthritis     Asthma     Back pain, chronic     Cerebral artery occlusion with cerebral infarction (HCC)     tia    Colitis     Depression     Diabetic neuropathy (HCC)     Diarrhea     Diarrhea     \"constant\" told she has colitis    Drug abuse ([facility] [address]     heroin quit 15 yrs ago    Eye injury     Fibromyalgia     Full dentures     Full dentures upper and lower full    H/O degenerative disc disease     Hidradenitis suppurativa     Hypertension     IBS (irritable bowel syndrome)     Lipoma     lt breast    Lump of breast, left     Migraine     Mixed hyperlipidemia 2016    Numbness     bilateral legs    Snores     Type 2 diabetes mellitus without complication ([facility] [address] 3/72/4368    lost 52 lbs off meds    Wears glasses        Past Surgical History:    Past Surgical History:   Procedure Laterality Date    BREAST LUMPECTOMY Left      SECTION      LIPOMA RESECTION Left 01/15/2018    left chest wall    NERVE BLOCK Right 2016    RT MBNB #1   celestone 6mg    NERVE BLOCK Right 2016    right MBNB #2, kenalog 40mg    NERVE BLOCK Right 2017    right radiofrequency, kenalog 40    NERVE BLOCK Right 2017    right lumbar RF kenalog 40mg    NERVE BLOCK Left 2017    Lt MBNB #1  25% marcaine    ORTHOPEDIC SURGERY Bilateral     5 surgeries on each arm including carpal tunnel release lis    OTHER SURGICAL HISTORY Left 2016    Left Groin Hydradenitis    CA EXC SKIN BENIG 0.6-1CM FACE,FACIAL Left 1/15/2018    EXCISION LIPOMA CHEST WALL performed by Mon[facility] Clark [facility] at [facility]      spinal cord stimulator subsequently removed    UPPER GASTROINTESTINAL ENDOSCOPY  12-29-15       Current Medications:   Current Outpatient Medications   Medication Sig Dispense Refill    Blood Glucose Monitoring Suppl (BLOOD GLUCOSE MONITOR SYSTEM) w/Device KIT Test 1 - 2/day 1 kit 0    blood glucose monitor strips Test 1-2/day 100 strip 11    Lancets MISC 1 each by Does not apply route 2 times daily 100 each 5    cyclobenzaprine (FLEXERIL) 5 MG tablet Take 1 tablet by mouth 3 times daily as needed for Muscle spasms 90 tablet 2    Lancets MISC 1 each by Does not apply route 2 times daily 100 each 5    albuterol sulfate HFA (PROAIR HFA) 108 (90 Base) MCG/ACT inhaler Inhale 2 puffs needs: Medical: Not on file     Non-medical: Not on file   Tobacco Use    Smoking status: Current Some Day Smoker     Packs/day: 0.10     Years: 23.00     Pack years: 2.30     Types: Cigarettes     Last attempt to quit: 10/1/2015     Years since quitting: 3.5    Smokeless tobacco: Never Used   Substance and Sexual Activity    Alcohol use: No    Drug use: No     Comment: hx marijuana quit 2004    Sexual activity: Yes     Partners: Female   Lifestyle    Physical activity:     Days per week: Not on file     Minutes per session: Not on file    Stress: Not on file   Relationships    Social connections:     Talks on phone: Not on file     Gets together: Not on file     Attends Faith service: Not on file     Active member of club or organization: Not on file     Attends meetings of clubs or organizations: Not on file     Relationship status: Not on file    Intimate partner violence:     Fear of current or ex partner: Not on file     Emotionally abused: Not on file     Physically abused: Not on file     Forced sexual activity: Not on file   Other Topics Concern    Not on file   Social History Narrative    Not on file       Family History:  Family History   Problem Relation Age of Onset    Heart Disease Mother     Stroke Mother     High Blood Pressure Mother     Breast Cancer Mother     High Blood Pressure Father     Diabetes Father          REVIEW OF SYSTEMS:  Review of Systems   Constitutional: Negative for chills, diaphoresis and fever. Respiratory: Negative for shortness of breath and wheezing. Cardiovascular: Negative for chest pain, palpitations and leg swelling. Musculoskeletal: Positive for arthralgias (right shoulder). Negative for back pain, gait problem, joint swelling, myalgias, neck pain and neck stiffness. Neurological: Negative for weakness and numbness.        I have reviewed the CC, HPI, ROS, PMH, FHX, Social History, and if not present in this note, I have reviewed in the patient's chart. I agree with the documentation provided by other staff and have reviewed their documentation prior to providing my signature indicating agreement. PHYSICAL EXAM:  Ht 5' 2.99\" (1.6 m)   Wt 229 lb 0.9 oz (103.9 kg)   BMI 40.59 kg/m²  Body mass index is 40.59 kg/m². Physical Exam  Gen: alert and oriented to person and place. Psych:  Appropriate affect; Appropriate knowledge base; Appropriate mood; No hallucinations; Head: normocephalic, atraumatic   Chest: symmetric chest excursion; nonlabored respiratory effort. Pelvis: stable; no obvious pelvis deformity  Ortho Exam  Extremity  No outward deformity of right shoulder. No skin lesions noted right shoulder. Patient extremely guarded and dramatic with any type of examination right shoulder. Full ROM cervical spine noted. M/S/V intact RUE without focal deficits. No gross instability of the shoulder is appreciated at this time but unable to fully assess secondary to limited exam being allowed by the patient. Radiology:     Xr Shoulder Right (min 2 Views)    Result Date: 4/19/2019  History: Right shoulder pain Findings: AP, scapular Y, axillary view x-rays of right shoulder done in the office today shows mild glenoid humeral joint space narrowing, joint space narrowing and osteophytosis at the chromic radicular joint and sclerotic changes noted at the greater tuberosity insertion of the rotator cuff as well as cystic changes in that area. No evidence of fracture, subluxation, dislocation, radiopaque foreign body, radiopaque tumors noted. Type II acromion morphology is appreciated. Impression: Right shoulder mild degenerative changes as described above. ASSESSMENT:     1. Acute pain of right shoulder         PLAN:       Discussed etiology and natural history of right shoulder pain.   The treatment options may include oral anti-inflammatories, bracing, injections, advanced imaging, activity modification, physical therapy and/or surgical intervention. The patient would like to proceed with MRI of right shoulder. Patient's symptoms of failed improve despite physical therapy and activity modification. The patient will follow up in when MRI is obtained, MRI of right shoulder is scheduled for Monday April 22. We discussed that the patient should call us with any concerns or questions. Return when MRI is obtained. No orders of the defined types were placed in this encounter. Orders Placed This Encounter   Procedures    XR SHOULDER RIGHT (MIN 2 VIEWS)     Standing Status:   Future     Number of Occurrences:   1     Standing Expiration Date:   4/18/2020     Zita LEVIN RMA am scribing for and in the presence of Dr. Dasha James. 4/21/2019 12:43 PM    I have reviewed and made changes accordingly to the work scribed by Manpreet Watson. The documentation accurately reflects work and decisions made by me. I have also reviewed documentation completed by clinical staff.     Dasha James DO, [facility]  4/21/2019 12:44 PM    This note is created with the assistance of a speech recognition program.  While intending to generate a document that actually reflects the content of the visit, the document can still have some errors including those of syntax and sound a like substitutions which may escape proof reading.  In such instances, actual meaning can be extrapolated by contextual diversion      Electronically signed by Irma Starr DO, FAOAO on 4/21/2019 at 12:43 PM

## 2019-04-18 NOTE — LETTER
Dr. Annamarie Manriquez  [facility]  [address] [facility] [address]  Dept: [phone]  Dept Fax: [phone]        4/21/19    Patient: Derek Pineda  YOB: 1975    Dear Nuvia Ashford MD,    I had the pleasure of seeing one of your patients, Migdalia Menendez today in the office. Below are the relevant portions of my assessment and plan of care. IMPRESSION:     1. Acute pain of right shoulder      PLAN:    Discussed etiology and natural history of right shoulder pain. The treatment options may include oral anti-inflammatories, bracing, injections, advanced imaging, activity modification, physical therapy and/or surgical intervention. The patient would like to proceed with MRI of right shoulder. Patient's symptoms of failed improve despite physical therapy and activity modification. The patient will follow up in when MRI is obtained, MRI of right shoulder is scheduled for Monday April 22. We discussed that the patient should call us with any concerns or questions. Thank you for allowing me to participate in the care of this patient. I will keep you updated on this patient's follow up and I look forward to serving you and your patients again in the future. Please don't hesitate to contact me at my mobile number [phone].         Onur Aleman

## 2019-04-22 ENCOUNTER — HOSPITAL ENCOUNTER (OUTPATIENT)
Dept: MAMMOGRAPHY | Age: 44
Discharge: HOME | End: 2019-04-24
Payer: COMMERCIAL

## 2019-04-22 ENCOUNTER — HOSPITAL ENCOUNTER (OUTPATIENT)
Age: 44
Discharge: HOME | End: 2019-04-22
Payer: COMMERCIAL

## 2019-04-22 ENCOUNTER — HOSPITAL ENCOUNTER (OUTPATIENT)
Dept: MRI IMAGING | Age: 44
Discharge: HOME | End: 2019-04-24
Payer: COMMERCIAL

## 2019-04-22 DIAGNOSIS — I10 ESSENTIAL HYPERTENSION: ICD-10-CM

## 2019-04-22 DIAGNOSIS — Z12.31 ENCOUNTER FOR SCREENING MAMMOGRAM FOR BREAST CANCER: ICD-10-CM

## 2019-04-22 DIAGNOSIS — E11.42 TYPE 2 DIABETES MELLITUS WITH DIABETIC POLYNEUROPATHY, WITHOUT LONG-TERM CURRENT USE OF INSULIN (HCC): ICD-10-CM

## 2019-04-22 DIAGNOSIS — M67.911 ROTATOR CUFF DYSFUNCTION, RIGHT: ICD-10-CM

## 2019-04-22 LAB
ANION GAP SERPL CALCULATED.3IONS-SCNC: 13 MMOL/L (ref 9–17)
BUN BLDV-MCNC: 9 MG/DL (ref 6–20)
BUN/CREAT BLD: ABNORMAL (ref 9–20)
CALCIUM SERPL-MCNC: 9.4 MG/DL (ref 8.6–10.4)
CHLORIDE BLD-SCNC: 106 MMOL/L (ref 98–107)
CO2: 20 MMOL/L (ref 20–31)
CREAT SERPL-MCNC: 0.75 MG/DL (ref 0.5–0.9)
GFR AFRICAN AMERICAN: >60 ML/MIN
GFR NON-AFRICAN AMERICAN: >60 ML/MIN
GFR SERPL CREATININE-BSD FRML MDRD: ABNORMAL ML/MIN/{1.73_M2}
GFR SERPL CREATININE-BSD FRML MDRD: ABNORMAL ML/MIN/{1.73_M2}
GLUCOSE BLD-MCNC: 135 MG/DL (ref 70–99)
POTASSIUM SERPL-SCNC: 4.3 MMOL/L (ref 3.7–5.3)
SODIUM BLD-SCNC: 139 MMOL/L (ref 135–144)

## 2019-04-22 PROCEDURE — 80048 BASIC METABOLIC PNL TOTAL CA: CPT

## 2019-04-22 PROCEDURE — 77067 SCR MAMMO BI INCL CAD: CPT

## 2019-04-22 PROCEDURE — 36415 COLL VENOUS BLD VENIPUNCTURE: CPT

## 2019-04-22 PROCEDURE — 73221 MRI JOINT UPR EXTREM W/O DYE: CPT

## 2019-04-23 DIAGNOSIS — R92.8 ABNORMAL MAMMOGRAM OF LEFT BREAST: Primary | ICD-10-CM

## 2019-04-24 ENCOUNTER — OFFICE VISIT (OUTPATIENT)
Dept: ORTHOPEDIC SURGERY | Age: 44
End: 2019-04-24
Payer: COMMERCIAL

## 2019-04-24 VITALS — BODY MASS INDEX: 42.14 KG/M2 | WEIGHT: 229 LBS | HEIGHT: 62 IN

## 2019-04-24 DIAGNOSIS — M75.81 ROTATOR CUFF TENDONITIS, RIGHT: Primary | ICD-10-CM

## 2019-04-24 DIAGNOSIS — M67.911 TENDINOPATHY OF RIGHT ROTATOR CUFF: ICD-10-CM

## 2019-04-24 PROCEDURE — 99213 OFFICE O/P EST LOW 20 MIN: CPT | Performed by: ORTHOPAEDIC SURGERY

## 2019-04-24 ASSESSMENT — ENCOUNTER SYMPTOMS
ABDOMINAL PAIN: 0
VOMITING: 0
CHEST TIGHTNESS: 0
COUGH: 0
APNEA: 0

## 2019-04-24 NOTE — PROGRESS NOTES
[address] [place] [address] [place] [address]  Dept: [phone]  Dept Fax: [phone]        Ambulatory Follow Up      Subjective:   Irais Carroll is a 45y.o. year old female who presents to our office today for routine followup regarding her   1. Rotator cuff tendonitis, right    2. Tendinopathy of right rotator cuff    . Chief Complaint   Patient presents with    Shoulder Pain     Right MRI results       HPI- Irais Carroll  is a 45 y.o. Right hand dominant  female who presents today in follow for right shoulder pain. The patient was last seen on 4/18/19 and underwent treatment in the form of MRI of the right shoulder. The patient received MRI on 4/22/18. The patient states that her shoulder has been painful for approximately 6 months. The patient sates that her book bag fell to her shoulder and caused a pop to he right shoulder. The patient feels that she is not able to complete her normal activities due to the pain in her shoulder. The patient is using Voltaren gel and tylenol without relief. The patient cannot take oral NSAIDS due to bleeding. The patient has tried physical therapy but states that it is painful. Review of Systems   Constitutional: Negative for chills and fever. Respiratory: Negative for apnea, cough and chest tightness. Cardiovascular: Negative for chest pain and palpitations. Gastrointestinal: Negative for abdominal pain and vomiting. Genitourinary: Negative for difficulty urinating. Musculoskeletal: Positive for arthralgias (Right shoulder). Negative for gait problem, joint swelling and myalgias. Neurological: Negative for dizziness, weakness and numbness. I have reviewed the CC, HPI, ROS, PMH, FHX, Social History, and if not present in this note, I have reviewed in the patient's chart.    I agree with the documentation provided by other staff and have reviewed their documentation prior to providing my signature indicating agreement. Objective :   Ht 5' 2\" (1.575 m)   Wt 229 lb (103.9 kg)   BMI 41.88 kg/m²  Body mass index is 41.88 kg/m². General: Maycol Edwards is a 45 y.o. female who is alert and oriented and sitting comfortably in our office. Ortho Exam  MS:  F=90 limited by pain right shoulder. Decreased ER strength on the right is noted with resistance testing. No instability right shoulder is noted. M/S/V intact RUE without focal deficits. Neuro: alert and oriented to person and place. Eyes: Extra-ocular muscles intact  Mouth: Oral mucosa moist. No perioral lesions  Pulm: Respirations unlabored and regular. Symmetric chest excursion without outward deformity is noted. Skin: warm, well perfused  Psych:   Patient has good fund of knowledge and displays understanging of exam, diagnosis, and plan.     Radiology:   Narrative   EXAMINATION:   MRI OF THE RIGHT SHOULDER WITHOUT CONTRAST   4/22/2019 3:57 pm       TECHNIQUE:   Multiplanar multisequence MRI of the right shoulder was performed without the   administration of intravenous contrast.       COMPARISON:   Right shoulder plain radiographs performed on 04/18/2019       HISTORY:   ORDERING SYSTEM PROVIDED HISTORY: Rotator cuff dysfunction, right       Patient is a 51-year-old female with rotator cuff dysfunction.       FINDINGS:   ROTATOR CUFF: No significant fluid in the subacromial subdeltoid bursa.       Moderate supraspinatus tendinopathy.       Mild infraspinatus tendinopathy.       Mild subscapularis tendinopathy.       Teres minor muscle/tendon appears grossly intact without evidence of tearing.       No partial or full thickness rotator cuff tear.       No significant atrophy or fatty degeneration of the visualized rotator cuff   musculature.       BICEPS TENDON: Long head of the biceps tendon properly located in the   bicipital groove and seen extending to the biceps labral anchor.       LABRUM: No evidence for labral tear or paralabral continuing Voltaren Gel. The patient will follow up in 8 weeks. We discussed that the patient should call us with any concerns or questions. Follow up:Return in about 8 weeks (around 6/19/2019). Orders Placed This Encounter   Medications    methylPREDNISolone (MEDROL DOSEPACK) 4 MG tablet     Sig: Take by mouth. Dispense:  1 kit     Refill:  0         Orders Placed This Encounter   Procedures   [facility] Physical [facility]     Referral Priority:   Routine     Referral Type:   Eval and Treat     Referral Reason:   Specialty Services Required     Requested Specialty:   Physical Therapy     Number of Visits Requested:   1     I, Stephanie Brunson LPN am scribing for and in the presence of Dr. Clare Membreno  4/28/2019 10:24 AM        I have reviewed and made changes accordingly to the work scribed by Stephanie Brunson LPN. The documentation accurately reflects work and decisions made by me. I have also reviewed documentation completed by clinical staff.     Clare Membreno DO, [facility]  4/28/2019 11:25 PM    This note is created with the assistance of a speech recognition program.  While intending to generate a document that actually reflects the content of the visit, the document can still have some errors including those of syntax and sound a like substitutions which may escape proof reading.  In such instances, actual meaning can be extrapolated by contextual diversion      Electronically signed by John Bonilla DO, FAOAO on 4/28/2019 at 11:24 PM

## 2019-04-28 RX ORDER — METHYLPREDNISOLONE 4 MG/1
TABLET ORAL
Qty: 1 KIT | Refills: 0 | Status: SHIPPED | OUTPATIENT
Start: 2019-04-28 | End: 2019-04-30

## 2019-04-30 NOTE — TELEPHONE ENCOUNTER
Glucose Meter sent to the pharmacy.
2:45 PM STV MAMMO RM 1 STVZ MAMMO STV Radiolog   4/22/2019  3:45 PM STV MRI RM 1 STVZ MRI STV Radiolog   10/15/2019  2:00 PM Arden Winters MD [facility] IM [id number]            Patient Active Problem List:     Lumbar back pain     Depression     Fibromyalgia     Anxiety     Type 2 diabetes mellitus without complication (HCC)     Iron deficiency anemia     Mixed hyperlipidemia     Rib pain on right side     Facet degeneration of lumbar region     Lipoma of chest wall     Bipolar disorder ([facility] [address]     Depressed mood with feeling of loneliness     Essential hypertension

## 2019-05-01 ENCOUNTER — HOSPITAL ENCOUNTER (OUTPATIENT)
Dept: MAMMOGRAPHY | Age: 44
Discharge: HOME | End: 2019-05-03
Payer: COMMERCIAL

## 2019-05-01 DIAGNOSIS — R92.8 ABNORMAL MAMMOGRAM OF LEFT BREAST: ICD-10-CM

## 2019-05-01 PROCEDURE — G0279 TOMOSYNTHESIS, MAMMO: HCPCS

## 2019-06-08 DIAGNOSIS — E11.42 TYPE 2 DIABETES MELLITUS WITH DIABETIC POLYNEUROPATHY, WITHOUT LONG-TERM CURRENT USE OF INSULIN (HCC): ICD-10-CM

## 2019-06-08 DIAGNOSIS — M17.11 OSTEOARTHRITIS OF RIGHT KNEE, UNSPECIFIED OSTEOARTHRITIS TYPE: ICD-10-CM

## 2019-06-10 RX ORDER — GABAPENTIN 300 MG/1
CAPSULE ORAL
Qty: 180 CAPSULE | Refills: 1 | Status: SHIPPED | OUTPATIENT
Start: 2019-06-10 | End: 2019-08-05 | Stop reason: SDUPTHER

## 2019-06-10 NOTE — TELEPHONE ENCOUNTER
Escribe request for Neurotin . Please escribe if appropriate      Next Visit Date:  10/15/19     Health Maintenance   Topic Date Due    DTaP/Tdap/Td vaccine (1 - Tdap) 01/14/1996    Cervical cancer screen  01/14/1998    Flu vaccine (Season Ended) 09/04/2019 (Originally 9/1/2019)    Hepatitis B Vaccine (1 of 3 - Risk 3-dose series) 03/25/2020 (Originally 1/14/1996)    Diabetic foot exam  01/04/2020    A1C test (Diabetic or Prediabetic)  01/04/2020    Diabetic microalbuminuria test  01/04/2020    Lipid screen  01/04/2020    Diabetic retinal exam  02/04/2020    Potassium monitoring  04/22/2020    Creatinine monitoring  04/22/2020    Breast cancer screen  05/01/2020    Pneumococcal 0-64 years Vaccine  Completed    HIV screen  Completed       Hemoglobin A1C (%)   Date Value   01/04/2019 6.7   06/04/2018 6.6   05/18/2017 6.1 (H)             ( goal A1C is < 7)   Microalb/Crt.  Ratio (mcg/mg creat)   Date Value   01/04/2019 CANNOT BE CALCULATED     LDL Cholesterol (mg/dL)   Date Value   01/04/2019 89       (goal LDL is <100)   AST (U/L)   Date Value   07/25/2017 22     ALT (U/L)   Date Value   07/25/2017 15     BUN (mg/dL)   Date Value   04/22/2019 9     BP Readings from Last 3 Encounters:   04/10/19 120/86   02/14/19 (!) 138/94   02/03/19 115/78          (goal 120/80)          Patient Active Problem List:     Lumbar back pain     Depression     Fibromyalgia     Anxiety     Type 2 diabetes mellitus without complication (HCC)     Iron deficiency anemia     Mixed hyperlipidemia     Rib pain on right side     Facet degeneration of lumbar region     Lipoma of chest wall     Bipolar disorder ([address]     Depressed mood with feeling of loneliness     Essential hypertension

## 2019-07-06 DIAGNOSIS — M62.838 MUSCLE SPASM: ICD-10-CM

## 2019-07-08 RX ORDER — CYCLOBENZAPRINE HCL 5 MG
TABLET ORAL
Qty: 90 TABLET | Refills: 2 | Status: SHIPPED | OUTPATIENT
Start: 2019-07-08 | End: 2019-10-17 | Stop reason: ALTCHOICE

## 2019-07-08 NOTE — TELEPHONE ENCOUNTER
right side     Facet degeneration of lumbar region     Lipoma of chest wall     Bipolar disorder ([address]     Depressed mood with feeling of loneliness     Essential hypertension

## 2019-08-03 DIAGNOSIS — J42 CHRONIC BRONCHITIS, UNSPECIFIED CHRONIC BRONCHITIS TYPE (HCC): ICD-10-CM

## 2019-08-04 DIAGNOSIS — E11.9 TYPE 2 DIABETES MELLITUS WITHOUT COMPLICATION, WITHOUT LONG-TERM CURRENT USE OF INSULIN (HCC): ICD-10-CM

## 2019-08-05 RX ORDER — DEXAMETHASONE 4 MG/1
TABLET ORAL
Qty: 12 G | Refills: 1 | Status: SHIPPED | OUTPATIENT
Start: 2019-08-05 | End: 2019-09-28 | Stop reason: SDUPTHER

## 2019-08-05 NOTE — TELEPHONE ENCOUNTER
Refill request for Flovent. If appropriate please send medication(s) to patients pharmacy. Next appt: 10/15/2019    Health Maintenance   Topic Date Due    DTaP/Tdap/Td vaccine (1 - Tdap) 01/14/1996    Cervical cancer screen  01/14/1998    Flu vaccine (1) 09/04/2019 (Originally 9/1/2019)    Hepatitis B Vaccine (1 of 3 - Risk 3-dose series) 03/25/2020 (Originally 1/14/1996)    Diabetic foot exam  01/04/2020    A1C test (Diabetic or Prediabetic)  01/04/2020    Diabetic microalbuminuria test  01/04/2020    Lipid screen  01/04/2020    Diabetic retinal exam  02/04/2020    Potassium monitoring  04/22/2020    Creatinine monitoring  04/22/2020    Breast cancer screen  05/01/2020    Pneumococcal 0-64 years Vaccine  Completed    HIV screen  Completed       Hemoglobin A1C (%)   Date Value   01/04/2019 6.7   06/04/2018 6.6   05/18/2017 6.1 (H)             ( goal A1C is < 7)   Microalb/Crt.  Ratio (mcg/mg creat)   Date Value   01/04/2019 CANNOT BE CALCULATED     LDL Cholesterol (mg/dL)   Date Value   01/04/2019 89       (goal LDL is <100)   AST (U/L)   Date Value   07/25/2017 22     ALT (U/L)   Date Value   07/25/2017 15     BUN (mg/dL)   Date Value   04/22/2019 9     BP Readings from Last 3 Encounters:   04/10/19 120/86   02/14/19 (!) 138/94   02/03/19 115/78          (goal 120/80)          Patient Active Problem List:     Lumbar back pain     Depression     Fibromyalgia     Anxiety     Type 2 diabetes mellitus without complication (HCC)     Iron deficiency anemia     Mixed hyperlipidemia     Rib pain on right side     Facet degeneration of lumbar region     Lipoma of chest wall     Bipolar disorder ([address]     Depressed mood with feeling of loneliness     Essential hypertension

## 2019-08-30 DIAGNOSIS — I10 ESSENTIAL HYPERTENSION: ICD-10-CM

## 2019-08-31 RX ORDER — AMLODIPINE BESYLATE 10 MG/1
TABLET ORAL
Qty: 30 TABLET | Refills: 2 | Status: SHIPPED | OUTPATIENT
Start: 2019-08-31 | End: 2019-10-17 | Stop reason: SDUPTHER

## 2019-10-17 ENCOUNTER — OFFICE VISIT (OUTPATIENT)
Dept: INTERNAL MEDICINE | Age: 44
End: 2019-10-17
Payer: COMMERCIAL

## 2019-10-17 VITALS
HEIGHT: 63 IN | WEIGHT: 230 LBS | BODY MASS INDEX: 40.75 KG/M2 | DIASTOLIC BLOOD PRESSURE: 102 MMHG | SYSTOLIC BLOOD PRESSURE: 156 MMHG | HEART RATE: 130 BPM

## 2019-10-17 DIAGNOSIS — E11.42 TYPE 2 DIABETES MELLITUS WITH DIABETIC POLYNEUROPATHY, WITHOUT LONG-TERM CURRENT USE OF INSULIN (HCC): Primary | ICD-10-CM

## 2019-10-17 DIAGNOSIS — E78.2 MIXED HYPERLIPIDEMIA: ICD-10-CM

## 2019-10-17 DIAGNOSIS — M17.11 OSTEOARTHRITIS OF RIGHT KNEE, UNSPECIFIED OSTEOARTHRITIS TYPE: ICD-10-CM

## 2019-10-17 DIAGNOSIS — E11.42 TYPE 2 DIABETES MELLITUS WITH DIABETIC POLYNEUROPATHY, WITHOUT LONG-TERM CURRENT USE OF INSULIN (HCC): ICD-10-CM

## 2019-10-17 DIAGNOSIS — E66.01 MORBID OBESITY WITH BMI OF 40.0-44.9, ADULT (HCC): ICD-10-CM

## 2019-10-17 DIAGNOSIS — F31.9 BIPOLAR AFFECTIVE DISORDER, REMISSION STATUS UNSPECIFIED (HCC): ICD-10-CM

## 2019-10-17 DIAGNOSIS — I10 ESSENTIAL HYPERTENSION: ICD-10-CM

## 2019-10-17 DIAGNOSIS — G47.33 OSA (OBSTRUCTIVE SLEEP APNEA): ICD-10-CM

## 2019-10-17 DIAGNOSIS — G89.29 CHRONIC BILATERAL LOW BACK PAIN WITHOUT SCIATICA: ICD-10-CM

## 2019-10-17 DIAGNOSIS — Z23 NEED FOR PROPHYLACTIC VACCINATION AGAINST DIPHTHERIA-TETANUS-PERTUSSIS (DTP): ICD-10-CM

## 2019-10-17 DIAGNOSIS — K58.0 IRRITABLE BOWEL SYNDROME WITH DIARRHEA: ICD-10-CM

## 2019-10-17 DIAGNOSIS — J42 CHRONIC BRONCHITIS, UNSPECIFIED CHRONIC BRONCHITIS TYPE (HCC): ICD-10-CM

## 2019-10-17 DIAGNOSIS — M54.50 CHRONIC BILATERAL LOW BACK PAIN WITHOUT SCIATICA: ICD-10-CM

## 2019-10-17 LAB — HBA1C MFR BLD: 8.3 %

## 2019-10-17 PROCEDURE — 99214 OFFICE O/P EST MOD 30 MIN: CPT | Performed by: INTERNAL MEDICINE

## 2019-10-17 PROCEDURE — 83036 HEMOGLOBIN GLYCOSYLATED A1C: CPT | Performed by: INTERNAL MEDICINE

## 2019-10-17 RX ORDER — DICYCLOMINE HCL 20 MG
20 TABLET ORAL 3 TIMES DAILY PRN
Qty: 120 TABLET | Refills: 3 | Status: SHIPPED | OUTPATIENT
Start: 2019-10-17 | End: 2020-01-16 | Stop reason: SDUPTHER

## 2019-10-17 RX ORDER — LANCETS 30 GAUGE
1 EACH MISCELLANEOUS 2 TIMES DAILY
Qty: 100 EACH | Refills: 5 | Status: SHIPPED | OUTPATIENT
Start: 2019-10-17

## 2019-10-17 RX ORDER — CLONIDINE HYDROCHLORIDE 0.2 MG/1
TABLET ORAL
COMMUNITY
End: 2019-10-17

## 2019-10-17 RX ORDER — TIZANIDINE 4 MG/1
4 TABLET ORAL EVERY 8 HOURS PRN
Qty: 30 TABLET | Refills: 1 | Status: SHIPPED | OUTPATIENT
Start: 2019-10-17 | End: 2019-12-03 | Stop reason: SDUPTHER

## 2019-10-17 RX ORDER — AMLODIPINE BESYLATE 10 MG/1
TABLET ORAL
Qty: 30 TABLET | Refills: 2 | Status: SHIPPED | OUTPATIENT
Start: 2019-10-17 | End: 2020-01-16 | Stop reason: SDUPTHER

## 2019-10-17 RX ORDER — CARVEDILOL 3.12 MG/1
3.12 TABLET ORAL 2 TIMES DAILY
Qty: 60 TABLET | Refills: 1 | Status: SHIPPED | OUTPATIENT
Start: 2019-10-17 | End: 2019-12-11 | Stop reason: SDUPTHER

## 2019-10-18 ENCOUNTER — TELEPHONE (OUTPATIENT)
Dept: INTERNAL MEDICINE | Age: 44
End: 2019-10-18

## 2019-10-18 DIAGNOSIS — J42 CHRONIC BRONCHITIS, UNSPECIFIED CHRONIC BRONCHITIS TYPE (HCC): ICD-10-CM

## 2019-10-18 DIAGNOSIS — E11.42 TYPE 2 DIABETES MELLITUS WITH DIABETIC POLYNEUROPATHY, WITHOUT LONG-TERM CURRENT USE OF INSULIN (HCC): Primary | ICD-10-CM

## 2019-10-18 RX ORDER — GABAPENTIN 300 MG/1
CAPSULE ORAL
Qty: 180 CAPSULE | Refills: 1 | OUTPATIENT
Start: 2019-10-18

## 2019-10-18 RX ORDER — GABAPENTIN 300 MG/1
CAPSULE ORAL
Qty: 180 CAPSULE | Refills: 1 | Status: SHIPPED | OUTPATIENT
Start: 2019-10-18 | End: 2020-01-02 | Stop reason: SDUPTHER

## 2019-12-30 DIAGNOSIS — J42 CHRONIC BRONCHITIS, UNSPECIFIED CHRONIC BRONCHITIS TYPE (HCC): ICD-10-CM

## 2019-12-30 RX ORDER — DEXAMETHASONE 4 MG/1
TABLET ORAL
Qty: 12 G | Refills: 2 | Status: SHIPPED | OUTPATIENT
Start: 2019-12-30 | End: 2020-01-16 | Stop reason: ALTCHOICE

## 2020-01-02 RX ORDER — GABAPENTIN 300 MG/1
CAPSULE ORAL
Qty: 180 CAPSULE | Refills: 11 | Status: SHIPPED | OUTPATIENT
Start: 2020-01-02 | End: 2020-02-01

## 2020-01-02 NOTE — TELEPHONE ENCOUNTER
Request for gabapentin & metformin - medications pended. Patient appointment 1/16. Please fill if appropriate. Next Visit Date:  Future Appointments   Date Time Provider Lori Diezi   1/16/2020  8:15 AM Amina Whitlock MD [address] [facility]   Topic Date Due    DTaP/Tdap/Td vaccine (1 - Tdap) 01/14/1988    Cervical cancer screen  01/14/1998    Annual Wellness Visit (AWV)  04/03/2019    Flu vaccine (1) 09/01/2019    Diabetic foot exam  01/04/2020    Diabetic microalbuminuria test  01/04/2020    Lipid screen  01/04/2020    Hepatitis B vaccine (1 of 3 - Risk 3-dose series) 03/25/2020 (Originally 1/14/1996)    Diabetic retinal exam  02/04/2020    Potassium monitoring  04/22/2020    Creatinine monitoring  04/22/2020    Breast cancer screen  05/01/2020    A1C test (Diabetic or Prediabetic)  10/17/2020    Pneumococcal 0-64 years Vaccine  Completed    HIV screen  Completed       Hemoglobin A1C (%)   Date Value   10/17/2019 8.3   01/04/2019 6.7   06/04/2018 6.6             ( goal A1C is < 7)   Microalb/Crt.  Ratio (mcg/mg creat)   Date Value   01/04/2019 CANNOT BE CALCULATED     LDL Cholesterol (mg/dL)   Date Value   01/04/2019 89       (goal LDL is <100)   AST (U/L)   Date Value   07/25/2017 22     ALT (U/L)   Date Value   07/25/2017 15     BUN (mg/dL)   Date Value   04/22/2019 9     BP Readings from Last 3 Encounters:   10/17/19 (!) 156/102   04/10/19 120/86   02/14/19 (!) 138/94          (goal 120/80)    All Future Testing planned in [facility]  Lab Frequency Next Occurrence         Patient Active Problem List:     Lumbar back pain     Depression     Fibromyalgia     Anxiety     Type 2 diabetes mellitus without complication (HCC)     Iron deficiency anemia     Mixed hyperlipidemia     Rib pain on right side     Facet degeneration of lumbar region     Lipoma of chest wall     Bipolar disorder (HCC)     Depressed mood with feeling of loneliness     Essential hypertension

## 2020-01-09 ENCOUNTER — HOSPITAL ENCOUNTER (EMERGENCY)
Age: 45
Discharge: HOME | End: 2020-01-09
Attending: EMERGENCY MEDICINE
Payer: COMMERCIAL

## 2020-01-09 ENCOUNTER — APPOINTMENT (OUTPATIENT)
Dept: GENERAL RADIOLOGY | Age: 45
End: 2020-01-09
Payer: COMMERCIAL

## 2020-01-09 VITALS
SYSTOLIC BLOOD PRESSURE: 138 MMHG | HEART RATE: 93 BPM | DIASTOLIC BLOOD PRESSURE: 84 MMHG | OXYGEN SATURATION: 96 % | RESPIRATION RATE: 18 BRPM | BODY MASS INDEX: 40.74 KG/M2 | TEMPERATURE: 98.3 F | WEIGHT: 230 LBS

## 2020-01-09 LAB
ABSOLUTE EOS #: 0.38 K/UL (ref 0–0.44)
ABSOLUTE IMMATURE GRANULOCYTE: <0.03 K/UL (ref 0–0.3)
ABSOLUTE LYMPH #: 1.84 K/UL (ref 1.1–3.7)
ABSOLUTE MONO #: 0.42 K/UL (ref 0.1–1.2)
ANION GAP SERPL CALCULATED.3IONS-SCNC: 15 MMOL/L (ref 9–17)
BASOPHILS # BLD: 1 % (ref 0–2)
BASOPHILS ABSOLUTE: 0.04 K/UL (ref 0–0.2)
BUN BLDV-MCNC: 9 MG/DL (ref 6–20)
BUN/CREAT BLD: ABNORMAL (ref 9–20)
CALCIUM SERPL-MCNC: 9.4 MG/DL (ref 8.6–10.4)
CHLORIDE BLD-SCNC: 106 MMOL/L (ref 98–107)
CO2: 17 MMOL/L (ref 20–31)
CREAT SERPL-MCNC: 0.85 MG/DL (ref 0.5–0.9)
D-DIMER QUANTITATIVE: 0.34 MG/L FEU
DIFFERENTIAL TYPE: ABNORMAL
EOSINOPHILS RELATIVE PERCENT: 5 % (ref 1–4)
GFR AFRICAN AMERICAN: >60 ML/MIN
GFR NON-AFRICAN AMERICAN: >60 ML/MIN
GFR SERPL CREATININE-BSD FRML MDRD: ABNORMAL ML/MIN/{1.73_M2}
GFR SERPL CREATININE-BSD FRML MDRD: ABNORMAL ML/MIN/{1.73_M2}
GLUCOSE BLD-MCNC: 220 MG/DL (ref 70–99)
HCG QUALITATIVE: NEGATIVE
HCT VFR BLD CALC: 37.3 % (ref 36.3–47.1)
HEMOGLOBIN: 11.7 G/DL (ref 11.9–15.1)
IMMATURE GRANULOCYTES: 0 %
LYMPHOCYTES # BLD: 26 % (ref 24–43)
MCH RBC QN AUTO: 26.2 PG (ref 25.2–33.5)
MCHC RBC AUTO-ENTMCNC: 31.4 G/DL (ref 28.4–34.8)
MCV RBC AUTO: 83.6 FL (ref 82.6–102.9)
MONOCYTES # BLD: 6 % (ref 3–12)
NRBC AUTOMATED: 0 PER 100 WBC
PDW BLD-RTO: 15.9 % (ref 11.8–14.4)
PLATELET # BLD: 236 K/UL (ref 138–453)
PLATELET ESTIMATE: ABNORMAL
PMV BLD AUTO: 10.8 FL (ref 8.1–13.5)
POTASSIUM SERPL-SCNC: 4.1 MMOL/L (ref 3.7–5.3)
RBC # BLD: 4.46 M/UL (ref 3.95–5.11)
RBC # BLD: ABNORMAL 10*6/UL
SEG NEUTROPHILS: 62 % (ref 36–65)
SEGMENTED NEUTROPHILS ABSOLUTE COUNT: 4.36 K/UL (ref 1.5–8.1)
SODIUM BLD-SCNC: 138 MMOL/L (ref 135–144)
TROPONIN INTERP: NORMAL
TROPONIN INTERP: NORMAL
TROPONIN T: NORMAL NG/ML
TROPONIN T: NORMAL NG/ML
TROPONIN, HIGH SENSITIVITY: <6 NG/L (ref 0–14)
TROPONIN, HIGH SENSITIVITY: <6 NG/L (ref 0–14)
WBC # BLD: 7.1 K/UL (ref 3.5–11.3)
WBC # BLD: ABNORMAL 10*3/UL

## 2020-01-09 PROCEDURE — 84703 CHORIONIC GONADOTROPIN ASSAY: CPT

## 2020-01-09 PROCEDURE — 93005 ELECTROCARDIOGRAM TRACING: CPT | Performed by: STUDENT IN AN ORGANIZED HEALTH CARE EDUCATION/TRAINING PROGRAM

## 2020-01-09 PROCEDURE — 84484 ASSAY OF TROPONIN QUANT: CPT

## 2020-01-09 PROCEDURE — 85379 FIBRIN DEGRADATION QUANT: CPT

## 2020-01-09 PROCEDURE — 80048 BASIC METABOLIC PNL TOTAL CA: CPT

## 2020-01-09 PROCEDURE — 99285 EMERGENCY DEPT VISIT HI MDM: CPT

## 2020-01-09 PROCEDURE — 85025 COMPLETE CBC W/AUTO DIFF WBC: CPT

## 2020-01-09 PROCEDURE — 71046 X-RAY EXAM CHEST 2 VIEWS: CPT

## 2020-01-09 PROCEDURE — 6370000000 HC RX 637 (ALT 250 FOR IP): Performed by: STUDENT IN AN ORGANIZED HEALTH CARE EDUCATION/TRAINING PROGRAM

## 2020-01-09 RX ORDER — ACETAMINOPHEN 500 MG
1000 TABLET ORAL ONCE
Status: COMPLETED | OUTPATIENT
Start: 2020-01-09 | End: 2020-01-09

## 2020-01-09 RX ORDER — ACETAMINOPHEN 500 MG
1000 TABLET ORAL 3 TIMES DAILY PRN
Qty: 60 TABLET | Refills: 0 | Status: SHIPPED | OUTPATIENT
Start: 2020-01-09 | End: 2020-05-11 | Stop reason: ALTCHOICE

## 2020-01-09 RX ADMIN — ACETAMINOPHEN 1000 MG: 500 TABLET ORAL at 16:35

## 2020-01-09 ASSESSMENT — PAIN DESCRIPTION - ORIENTATION: ORIENTATION: RIGHT;LEFT

## 2020-01-09 ASSESSMENT — ENCOUNTER SYMPTOMS
EYE PAIN: 0
EYE ITCHING: 0
NAUSEA: 0
ABDOMINAL PAIN: 0
VOMITING: 0
COUGH: 1
SHORTNESS OF BREATH: 1
RHINORRHEA: 0
WHEEZING: 0
DIARRHEA: 0
TROUBLE SWALLOWING: 0
SORE THROAT: 0

## 2020-01-09 ASSESSMENT — PAIN DESCRIPTION - PROGRESSION: CLINICAL_PROGRESSION: GRADUALLY WORSENING

## 2020-01-09 ASSESSMENT — PAIN DESCRIPTION - PAIN TYPE: TYPE: ACUTE PAIN

## 2020-01-09 ASSESSMENT — PAIN DESCRIPTION - DESCRIPTORS: DESCRIPTORS: ACHING

## 2020-01-09 ASSESSMENT — PAIN DESCRIPTION - LOCATION: LOCATION: CHEST

## 2020-01-09 ASSESSMENT — PAIN DESCRIPTION - ONSET: ONSET: SUDDEN

## 2020-01-09 ASSESSMENT — PAIN SCALES - GENERAL
PAINLEVEL_OUTOF10: 10
PAINLEVEL_OUTOF10: 8

## 2020-01-09 NOTE — ED PROVIDER NOTES
completed with a voice recognition program.  Efforts were made to edit the dictations but occasionally words are mis-transcribed. )    Irma Perez MD  Attending Emergency Medicine Physician             Juan Mijares MD  01/09/20 2112

## 2020-01-09 NOTE — ED PROVIDER NOTES
surgery with implant; orthopedic surgery (Bilateral); Upper gastrointestinal endoscopy (12-29-15); other surgical history (Left, 2016); Nerve Block (Right, 2016); Nerve Block (Right, 2016); Nerve Block (Right, 2017); Nerve Block (Right, 2017); Nerve Block (Left, 2017); lipoma resection (Left, 01/15/2018); pr exc skin benig 0.6-1cm face,facial (Left, 1/15/2018); and Breast lumpectomy (Left).      Social History     Socioeconomic History    Marital status: Single     Spouse name: Not on file    Number of children: 2    Years of education: Not on file    Highest education level: Not on file   Occupational History    Occupation: disability   Social Needs    Financial resource strain: Not on file    Food insecurity:     Worry: Not on file     Inability: Not on file    Transportation needs:     Medical: Not on file     Non-medical: Not on file   Tobacco Use    Smoking status: Current Some Day Smoker     Packs/day: 0.10     Years: 23.00     Pack years: 2.30     Types: Cigarettes     Last attempt to quit: 10/1/2015     Years since quittin.2    Smokeless tobacco: Never Used   Substance and Sexual Activity    Alcohol use: No    Drug use: No     Comment: hx marijuana quit     Sexual activity: Yes     Partners: Female   Lifestyle    Physical activity:     Days per week: Not on file     Minutes per session: Not on file    Stress: Not on file   Relationships    Social connections:     Talks on phone: Not on file     Gets together: Not on file     Attends Mu-ism service: Not on file     Active member of club or organization: Not on file     Attends meetings of clubs or organizations: Not on file     Relationship status: Not on file    Intimate partner violence:     Fear of current or ex partner: Not on file     Emotionally abused: Not on file     Physically abused: Not on file     Forced sexual activity: Not on file   Other Topics Concern    Not on file   Social History Narrative    Not on file       Family History   Problem Relation Age of Onset    Heart Disease Mother     Stroke Mother     High Blood Pressure Mother     Breast Cancer Mother     High Blood Pressure Father     Diabetes Father         Allergies:  Dye [barium-containing compounds]; Nsaids; Demerol; Pcn [penicillins]; and Tramadol    Home Medications:  Prior to Admission medications    Medication Sig Start Date End Date Taking?  Authorizing Provider   acetaminophen (TYLENOL) 500 MG tablet Take 2 tablets by mouth 3 times daily as needed for Pain 1/9/20  Yes Candance Revere,    gabapentin (NEURONTIN) 300 MG capsule take 2 capsule by mouth three times a day 1/2/20 2/1/20  Elvie Castelan MD   metFORMIN (GLUCOPHAGE) 1000 MG tablet take 1 tablet by mouth twice a day with food 1/2/20   Elvie Castelan MD   FLOVENT  MCG/ACT inhaler inhale 2 puffs by mouth twice a day 12/30/19   Doreen Aguilera MD   carvedilol (COREG) 3.125 MG tablet take 1 tablet by mouth twice a day 12/11/19   Doreen Aguilera MD   tiZANidine (ZANAFLEX) 4 MG tablet take 1 tablet by mouth every 8 hours if needed for SPASMS 12/3/19   Doreen Aguilera MD   tiotropium (SPIRIVA RESPIMAT) 1.25 MCG/ACT AERS inhaler Inhale 2 puffs into the lungs daily 10/21/19   Doreen Aguilera MD   Ertugliflozin L-PyroglutamicAc (STEGLATRO) 5 MG TABS Take 5 mg by mouth daily 10/21/19   Doreen Aguilera MD   amLODIPine (NORVASC) 10 MG tablet take 1 tablet by mouth once daily 10/17/19   Doreen Aguilera MD   dicyclomine (BENTYL) 20 MG tablet Take 1 tablet by mouth 3 times daily as needed (cramping) 10/17/19   Doreen Aguilera MD   Lancets MISC 1 each by Does not apply route 2 times daily 10/17/19   Doreen Aguilera MD   blood glucose monitor strips Test 1-2/day 4/18/19   Doreen Aguilera MD   albuterol sulfate HFA (PROAIR HFA) 108 (90 Base) MCG/ACT inhaler Inhale 2 puffs into the lungs every 6 hours as needed for Wheezing 4/16/19   Doreen Aguilera MD posterior oropharyngeal erythema. Eyes:      Conjunctiva/sclera: Conjunctivae normal.   Neck:      Musculoskeletal: Normal range of motion and neck supple. No neck rigidity. Cardiovascular:      Rate and Rhythm: Normal rate and regular rhythm. Heart sounds: No murmur. No gallop. Pulmonary:      Effort: No respiratory distress. Breath sounds: Normal breath sounds. No stridor. No wheezing, rhonchi or rales. Abdominal:      General: There is no distension. Palpations: Abdomen is soft. Tenderness: There is no tenderness. There is no guarding. Musculoskeletal:      Right lower leg: No edema. Left lower leg: No edema. Skin:     General: Skin is warm and dry. Neurological:      Mental Status: She is alert. DIFFERENTIAL  DIAGNOSIS     PLAN (LABS / IMAGING / EKG):  Orders Placed This Encounter   Procedures    XR CHEST STANDARD (2 VW)    CBC Auto Differential    BASIC METABOLIC PANEL    Troponin    Troponin    HCG Qualitative, Serum    D-Dimer, Quantitative    EKG 12 Lead       MEDICATIONS ORDERED:  Orders Placed This Encounter   Medications    acetaminophen (TYLENOL) tablet 1,000 mg    acetaminophen (TYLENOL) 500 MG tablet     Sig: Take 2 tablets by mouth 3 times daily as needed for Pain     Dispense:  60 tablet     Refill:  0       DDX: Viral illness, bronchitis, pneumonia, ACS, pulmonary embolus    Initial MDM/Plan/ED course: 45 y.o. female who presents with cough, URI symptoms, chest pain for the last 3 days. On exam vitals are normal patient is in no acute distress. Physical exam unremarkable with normal heart and lung sounds, soft nontender abdomen, no leg swelling. Patient does have significant cardiac risk factors and therefore cardiac work-up was obtained. Patient did intermittently become tachycardic however this is likely due to anxiety, however d-dimer was obtained and normal.  Likelihood of PE is very low at this time.   Chest x-ray unremarkable with no evidence of pneumonia. Lab work normal, negative pregnancy. Symptoms likely secondary to either viral illness or bronchitis. Patient has been taking Tessalon Perls and Robitussin at home without relief. I instructed her that as long she is continuing to smoke she is still going to have a cough and not improve. Patient agreed to try smoking cessation and use Tylenol for chills and body aches. Patient discharged home. DIAGNOSTIC RESULTS / EMERGENCY DEPARTMENT COURSE / MDM     LABS:  Labs Reviewed   CBC WITH AUTO DIFFERENTIAL - Abnormal; Notable for the following components:       Result Value    Hemoglobin 11.7 (*)     RDW 15.9 (*)     Eosinophils % 5 (*)     All other components within normal limits   BASIC METABOLIC PANEL - Abnormal; Notable for the following components:    Glucose 220 (*)     CO2 17 (*)     All other components within normal limits   TROPONIN   TROPONIN   HCG, SERUM, QUALITATIVE   D-DIMER, QUANTITATIVE         RADIOLOGY:  Xr Chest Standard (2 Vw)    Result Date: 1/9/2020  EXAMINATION: TWO XRAY VIEWS OF THE CHEST 1/9/2020 2:54 pm COMPARISON: 01/06/2019 HISTORY: Reason for Exam: pt states sob and chest pain FINDINGS: The lungs are without acute focal process. No effusion or pneumothorax. The cardiomediastinal silhouette is normal.  The osseous structures are intact without acute process. Negative chest.         EKG      All EKG's are interpreted by the [facility] Physician who either signs or Co-signs this chart in the absence of a cardiologist.      PROCEDURES:  None    CONSULTS:  None    CRITICAL CARE:  Please see attending note    FINAL IMPRESSION      1.  Viral illness          DISPOSITION / PLAN     DISPOSITION Decision To Discharge 01/09/2020 04:45:29 PM      PATIENT REFERRED TO:  MD Johnnie Tovar [facility] [address]  [phone]    Schedule an appointment as soon as possible for a visit   follow up      DISCHARGE MEDICATIONS:  New Prescriptions    ACETAMINOPHEN (TYLENOL) 500 MG TABLET    Take 2 tablets by mouth 3 times daily as needed for Pain       Richard Fernandez DO  Emergency Medicine Resident    (Please note that portions of this note were completed with a voice recognition program.Efforts were made to edit the dictations but occasionally words are mis-transcribed.)        Desirae Steve DO  Resident  01/09/20 [id number]

## 2020-01-10 LAB
EKG ATRIAL RATE: 88 BPM
EKG P AXIS: 33 DEGREES
EKG P-R INTERVAL: 156 MS
EKG Q-T INTERVAL: 370 MS
EKG QRS DURATION: 74 MS
EKG QTC CALCULATION (BAZETT): 447 MS
EKG T AXIS: 23 DEGREES
EKG VENTRICULAR RATE: 88 BPM

## 2020-01-16 ENCOUNTER — OFFICE VISIT (OUTPATIENT)
Dept: INTERNAL MEDICINE | Age: 45
End: 2020-01-16
Payer: COMMERCIAL

## 2020-01-16 VITALS
WEIGHT: 223 LBS | SYSTOLIC BLOOD PRESSURE: 111 MMHG | BODY MASS INDEX: 41.04 KG/M2 | DIASTOLIC BLOOD PRESSURE: 84 MMHG | HEIGHT: 62 IN | HEART RATE: 101 BPM

## 2020-01-16 LAB — HBA1C MFR BLD: 7.6 %

## 2020-01-16 PROCEDURE — 99211 OFF/OP EST MAY X REQ PHY/QHP: CPT | Performed by: INTERNAL MEDICINE

## 2020-01-16 PROCEDURE — 90688 IIV4 VACCINE SPLT 0.5 ML IM: CPT | Performed by: INTERNAL MEDICINE

## 2020-01-16 PROCEDURE — 83036 HEMOGLOBIN GLYCOSYLATED A1C: CPT | Performed by: INTERNAL MEDICINE

## 2020-01-16 PROCEDURE — 99214 OFFICE O/P EST MOD 30 MIN: CPT | Performed by: INTERNAL MEDICINE

## 2020-01-16 RX ORDER — DICYCLOMINE HCL 20 MG
20 TABLET ORAL 3 TIMES DAILY PRN
Qty: 120 TABLET | Refills: 3 | Status: SHIPPED | OUTPATIENT
Start: 2020-01-16 | End: 2020-01-18

## 2020-01-16 RX ORDER — CARVEDILOL 3.12 MG/1
3.12 TABLET ORAL 2 TIMES DAILY
Qty: 60 TABLET | Refills: 3 | Status: SHIPPED | OUTPATIENT
Start: 2020-01-16 | End: 2020-04-16

## 2020-01-16 RX ORDER — BUDESONIDE AND FORMOTEROL FUMARATE DIHYDRATE 160; 4.5 UG/1; UG/1
2 AEROSOL RESPIRATORY (INHALATION) 2 TIMES DAILY
Qty: 3 INHALER | Refills: 1 | Status: SHIPPED | OUTPATIENT
Start: 2020-01-16 | End: 2020-05-10 | Stop reason: SDUPTHER

## 2020-01-16 RX ORDER — TIZANIDINE 4 MG/1
4 TABLET ORAL EVERY 6 HOURS PRN
Qty: 60 TABLET | Refills: 3 | Status: SHIPPED | OUTPATIENT
Start: 2020-01-16 | End: 2020-04-10 | Stop reason: SDUPTHER

## 2020-01-16 RX ORDER — AMLODIPINE BESYLATE 10 MG/1
TABLET ORAL
Qty: 30 TABLET | Refills: 3 | Status: SHIPPED | OUTPATIENT
Start: 2020-01-16 | End: 2020-04-16

## 2020-01-16 RX ORDER — ALBUTEROL SULFATE 90 UG/1
2 AEROSOL, METERED RESPIRATORY (INHALATION) EVERY 6 HOURS PRN
Qty: 2 INHALER | Refills: 3 | Status: SHIPPED | OUTPATIENT
Start: 2020-01-16

## 2020-01-16 RX ORDER — DEXTROAMPHETAMINE SACCHARATE, AMPHETAMINE ASPARTATE MONOHYDRATE, DEXTROAMPHETAMINE SULFATE AND AMPHETAMINE SULFATE 7.5; 7.5; 7.5; 7.5 MG/1; MG/1; MG/1; MG/1
30 CAPSULE, EXTENDED RELEASE ORAL DAILY
COMMUNITY
Start: 2020-01-06

## 2020-01-16 NOTE — PROGRESS NOTES
[facility]    Progress Note    Date of patient's visit: 1/16/2020    Patient's Name:  Shannon Cabrera    YOB: 1975            Patient Care Team:  Federico Maldonado MD as PCP - General (Internal Medicine)  Federico Maldonado MD as PCP - [facility] Empaneled Provider  Corrinne Fore, APRN - CNP as Nurse Practitioner (Pain Management)  Scar Driver DO as Consulting Physician (General Surgery)    REASON FOR VISIT: Routine outpatient follow     Chief Complaint   Patient presents with    Hypertension    Diabetes    Health Maintenance     labs pended    Abdominal Cramping     pt states that she has endometriosis and has been having horrible cramps, has been in bed for 2 days. HISTORY OF PRESENT ILLNESS:    History was obtained from the patient. Shannon Cabrera is a 39 y.o. is here for follow-up of her chronic medical problems including uncontrolled diabetes and morbid obesity. She is on metformin. She never got Steglatro that was prescribed for her in October. A1c is slightly improved to 7.6 because she says she has made a lot of dietary changes. She stopped drinking carbonated beverages and has started baking and not frying. She has lost a little weight but she is concerned she is not losing much weight. She says she gets easily short of breath. She can walk a few steps around the house and then has to use her albuterol. This has been going on for 2 years at least and is not acute. PFTs done last year showed mild obstructive lung disease. Stress test 3 or 4 years ago was completely normal.  She denies leg edema or orthopnea. She wants her weight off. She is agreeing to go to her bariatric clinic. She also is complaining of menorrhagia. She has regular cycles but bleeding lasts almost 8 to 9 days. She is mildly anemic. She is not in any vitamins. She has not seen a gynecologist or me for a Pap in a long time.   She says she has lot of dysmenorrhea and cramping with her cycles. She says every time she has tried to schedule a Pap smear she has had menstrual bleeding and has had to cancel. She agrees to see a gynecologist and get a pelvic ultrasound done. She quit smoking last month. She started smoking at age of 14. She was smoking almost up to 2 packs/day. Results for POC orders placed in visit on 20   POCT glycosylated hemoglobin (Hb A1C)   Result Value Ref Range    Hemoglobin A1C 7.6 %          Past Medical History:   Diagnosis Date    Alcohol abuse     quit 15 yrs ago, recent relapse.      Anxiety     Arthritis     Asthma     Back pain, chronic     Cerebral artery occlusion with cerebral infarction (HCC)     tia    Colitis     Depression     Diabetic neuropathy (HCC)     Diarrhea     Diarrhea     \"constant\" told she has colitis    Drug abuse ([facility]     heroin quit 15 yrs ago    Eye injury     Fibromyalgia     Full dentures     Full dentures     upper and lower full    H/O degenerative disc disease     Hidradenitis suppurativa     Hypertension     IBS (irritable bowel syndrome)     Lipoma     lt breast    Lump of breast, left     Migraine     Mixed hyperlipidemia 2016    Numbness     bilateral legs    Snores     Type 2 diabetes mellitus without complication ([address]     lost 52 lbs off meds    Wears glasses        Past Surgical History:   Procedure Laterality Date    BREAST LUMPECTOMY Left      SECTION      LIPOMA RESECTION Left 01/15/2018    left chest wall    NERVE BLOCK Right 2016    RT MBNB #1   celestone 6mg    NERVE BLOCK Right 2016    right MBNB #2, kenalog 40mg    NERVE BLOCK Right 2017    right radiofrequency, kenalog 40    NERVE BLOCK Right 2017    right lumbar RF kenalog 40mg    NERVE BLOCK Left 2017    Lt MBNB #1  25% marcaine    ORTHOPEDIC SURGERY Bilateral     5 surgeries on each arm including carpal tunnel release lis    11    albuterol sulfate HFA (PROAIR HFA) 108 (90 Base) MCG/ACT inhaler Inhale 2 puffs into the lungs every 6 hours as needed for Wheezing 2 Inhaler 3    ondansetron (ZOFRAN ODT) 4 MG disintegrating tablet Take 1 tablet by mouth every 8 hours as needed for Nausea 10 tablet 0    escitalopram (LEXAPRO) 10 MG tablet Take 1 tablet by mouth daily 30 tablet 0    busPIRone (BUSPAR) 30 MG tablet Take 1 tablet by mouth 2 times daily  0    QUEtiapine (SEROQUEL XR) 300 MG XR tablet Take 1 tablet by mouth nightly 30 tablet 1     No current facility-administered medications on file prior to visit. SOCIAL HISTORY    Reviewed and no change from previous record. Hanny Blackwell  reports that she has been smoking cigarettes. She has a 2.30 pack-year smoking history. She has never used smokeless tobacco.    FAMILY HISTORY:    Reviewed and No change from previous visit    HEALTH MAINTENANCE DUE:      Health Maintenance Due   Topic Date Due    Cervical cancer screen  01/14/1998    Annual Wellness Visit (AWV)  04/03/2019    Flu vaccine (1) 09/01/2019    Diabetic foot exam  01/04/2020    Diabetic microalbuminuria test  01/04/2020    Lipid screen  01/04/2020       REVIEW OF SYSTEMS:    12 point review of symptoms completed and found to be normal except noted in the HPI    Review of Systems     Constitutional: Negative for chills, diaphoresis, fatigue and fever. HENT: Positive for congestion. Negative for ear pain and rhinorrhea.    Eyes: Negative for photophobia and visual disturbance. Respiratory: Positive for cough. Negative for shortness of breath and wheezing.    Cardiovascular: Negative for chest pain, palpitations and leg swelling. Gastrointestinal: Negative for abdominal pain, blood in stool, constipation and diarrhea. Endocrine: Negative for polydipsia and polyuria. Genitourinary: Negative for dysuria and frequency. Musculoskeletal: Positive for arthralgias. Negative for gait problem and joint swelling.    Skin: Negative for pallor and rash. Allergic/Immunologic: Negative for environmental allergies and immunocompromised state. Neurological: Negative for dizziness, syncope, weakness and headaches. Hematological: Negative for adenopathy. Does not bruise/bleed easily. Psychiatric/Behavioral: Positive for dysphoric mood. Negative for self-injury and suicidal ideas. The patient is not nervous/anxious.           PHYSICAL EXAM:     Vitals:    01/16/20 0813   BP: 111/84   Site: Right Upper Arm   Position: Sitting   Cuff Size: Large Adult   Pulse: 101   Weight: 223 lb (101.2 kg)   Height: 5' 2\" (1.575 m)     Body mass index is 40.79 kg/m². BP Readings from Last 3 Encounters:   01/16/20 111/84   01/09/20 138/84   10/17/19 (!) 156/102        Wt Readings from Last 3 Encounters:   01/16/20 223 lb (101.2 kg)   01/09/20 230 lb (104.3 kg)   10/17/19 230 lb (104.3 kg)       Physical Exam     HENT: Normocephalic, Atraumatic, Bilateral external ears normal, Oropharynx moist,  Neck- Normal range of motion, No tenderness, Supple, No stridor. Eyes:  PERRL, EOMI, Conjunctiva normal, No discharge. Respiratory:  Normal breath sounds, No respiratory distress, No wheezing, No chest tenderness. Cardiovascular:  Normal heart rate, Normal rhythm, No murmurs  GI:  Bowel sounds normal, Soft, No tenderness  :   No CVA tenderness. Musculoskeletal:  Intact distal pulses, No edema, No tenderness, very restricted ROM of right shoulder with pain.   Integument:  Warm, Dry, No erythema, No rash. Lymphatic:  No lymphadenopathy noted. Neurologic:  Alert & oriented x 3, Normal motor function, Normal sensory function, No focal deficits noted.    Psychiatric:  Affect normal      LABORATORY FINDINGS:    CBC:  Lab Results   Component Value Date    WBC 7.1 01/09/2020    HGB 11.7 01/09/2020     01/09/2020     BMP:    Lab Results   Component Value Date     01/09/2020    K 4.1 01/09/2020     01/09/2020    CO2 17 01/09/2020    BUN 9

## 2020-01-18 ENCOUNTER — HOSPITAL ENCOUNTER (EMERGENCY)
Age: 45
Discharge: HOME | End: 2020-01-18
Attending: EMERGENCY MEDICINE
Payer: COMMERCIAL

## 2020-01-18 VITALS
BODY MASS INDEX: 37.15 KG/M2 | RESPIRATION RATE: 18 BRPM | OXYGEN SATURATION: 98 % | TEMPERATURE: 96.8 F | HEIGHT: 65 IN | SYSTOLIC BLOOD PRESSURE: 116 MMHG | WEIGHT: 223 LBS | DIASTOLIC BLOOD PRESSURE: 88 MMHG | HEART RATE: 85 BPM

## 2020-01-18 LAB
ABSOLUTE EOS #: 0 K/UL (ref 0–0.4)
ABSOLUTE IMMATURE GRANULOCYTE: 0 K/UL (ref 0–0.3)
ABSOLUTE LYMPH #: 0.29 K/UL (ref 1–4.8)
ABSOLUTE MONO #: 0.39 K/UL (ref 0.1–0.8)
ALBUMIN SERPL-MCNC: 4.4 G/DL (ref 3.5–5.2)
ALBUMIN/GLOBULIN RATIO: 1.3 (ref 1–2.5)
ALP BLD-CCNC: 113 U/L (ref 35–104)
ALT SERPL-CCNC: 44 U/L (ref 5–33)
ANION GAP SERPL CALCULATED.3IONS-SCNC: 18 MMOL/L (ref 9–17)
AST SERPL-CCNC: 27 U/L
BASOPHILS # BLD: 0 % (ref 0–2)
BASOPHILS ABSOLUTE: 0 K/UL (ref 0–0.2)
BILIRUB SERPL-MCNC: 0.53 MG/DL (ref 0.3–1.2)
BILIRUBIN URINE: NEGATIVE
BUN BLDV-MCNC: 10 MG/DL (ref 6–20)
BUN/CREAT BLD: ABNORMAL (ref 9–20)
CALCIUM SERPL-MCNC: 9.6 MG/DL (ref 8.6–10.4)
CHLORIDE BLD-SCNC: 103 MMOL/L (ref 98–107)
CO2: 18 MMOL/L (ref 20–31)
COLOR: YELLOW
COMMENT UA: ABNORMAL
CREAT SERPL-MCNC: 0.81 MG/DL (ref 0.5–0.9)
DIFFERENTIAL TYPE: ABNORMAL
EOSINOPHILS RELATIVE PERCENT: 0 % (ref 1–4)
GFR AFRICAN AMERICAN: >60 ML/MIN
GFR NON-AFRICAN AMERICAN: >60 ML/MIN
GFR SERPL CREATININE-BSD FRML MDRD: ABNORMAL ML/MIN/{1.73_M2}
GFR SERPL CREATININE-BSD FRML MDRD: ABNORMAL ML/MIN/{1.73_M2}
GLUCOSE BLD-MCNC: 174 MG/DL (ref 70–99)
GLUCOSE URINE: ABNORMAL
HCG QUANTITATIVE: <1 IU/L
HCT VFR BLD CALC: 41 % (ref 36.3–47.1)
HEMOGLOBIN: 13 G/DL (ref 11.9–15.1)
IMMATURE GRANULOCYTES: 0 %
KETONES, URINE: ABNORMAL
LEUKOCYTE ESTERASE, URINE: NEGATIVE
LIPASE: 30 U/L (ref 13–60)
LYMPHOCYTES # BLD: 3 % (ref 24–44)
MCH RBC QN AUTO: 26.5 PG (ref 25.2–33.5)
MCHC RBC AUTO-ENTMCNC: 31.7 G/DL (ref 28.4–34.8)
MCV RBC AUTO: 83.5 FL (ref 82.6–102.9)
MONOCYTES # BLD: 4 % (ref 1–7)
MORPHOLOGY: ABNORMAL
NITRITE, URINE: NEGATIVE
NRBC AUTOMATED: 0 PER 100 WBC
PDW BLD-RTO: 15.7 % (ref 11.8–14.4)
PH UA: 5.5 (ref 5–8)
PLATELET # BLD: 269 K/UL (ref 138–453)
PLATELET ESTIMATE: ABNORMAL
PMV BLD AUTO: 10.1 FL (ref 8.1–13.5)
POTASSIUM SERPL-SCNC: 3.9 MMOL/L (ref 3.7–5.3)
PROTEIN UA: NEGATIVE
RBC # BLD: 4.91 M/UL (ref 3.95–5.11)
RBC # BLD: ABNORMAL 10*6/UL
SEG NEUTROPHILS: 93 % (ref 36–66)
SEGMENTED NEUTROPHILS ABSOLUTE COUNT: 9.02 K/UL (ref 1.8–7.7)
SODIUM BLD-SCNC: 139 MMOL/L (ref 135–144)
SPECIFIC GRAVITY UA: 1.04 (ref 1–1.03)
TOTAL PROTEIN: 7.7 G/DL (ref 6.4–8.3)
TURBIDITY: CLEAR
URINE HGB: NEGATIVE
UROBILINOGEN, URINE: NORMAL
WBC # BLD: 9.7 K/UL (ref 3.5–11.3)
WBC # BLD: ABNORMAL 10*3/UL

## 2020-01-18 PROCEDURE — 6370000000 HC RX 637 (ALT 250 FOR IP): Performed by: STUDENT IN AN ORGANIZED HEALTH CARE EDUCATION/TRAINING PROGRAM

## 2020-01-18 PROCEDURE — 96372 THER/PROPH/DIAG INJ SC/IM: CPT

## 2020-01-18 PROCEDURE — 85025 COMPLETE CBC W/AUTO DIFF WBC: CPT

## 2020-01-18 PROCEDURE — 83690 ASSAY OF LIPASE: CPT

## 2020-01-18 PROCEDURE — 84702 CHORIONIC GONADOTROPIN TEST: CPT

## 2020-01-18 PROCEDURE — 99284 EMERGENCY DEPT VISIT MOD MDM: CPT

## 2020-01-18 PROCEDURE — 81003 URINALYSIS AUTO W/O SCOPE: CPT

## 2020-01-18 PROCEDURE — 6360000002 HC RX W HCPCS: Performed by: STUDENT IN AN ORGANIZED HEALTH CARE EDUCATION/TRAINING PROGRAM

## 2020-01-18 PROCEDURE — 80053 COMPREHEN METABOLIC PANEL: CPT

## 2020-01-18 RX ORDER — ONDANSETRON 4 MG/1
4 TABLET, FILM COATED ORAL EVERY 8 HOURS PRN
Qty: 20 TABLET | Refills: 0 | Status: SHIPPED | OUTPATIENT
Start: 2020-01-18 | End: 2020-05-11 | Stop reason: ALTCHOICE

## 2020-01-18 RX ORDER — DICYCLOMINE HYDROCHLORIDE 10 MG/1
10 CAPSULE ORAL 4 TIMES DAILY PRN
Qty: 20 CAPSULE | Refills: 0 | Status: SHIPPED | OUTPATIENT
Start: 2020-01-18

## 2020-01-18 RX ORDER — DICYCLOMINE HYDROCHLORIDE 10 MG/ML
10 INJECTION INTRAMUSCULAR ONCE
Status: COMPLETED | OUTPATIENT
Start: 2020-01-18 | End: 2020-01-18

## 2020-01-18 RX ORDER — ONDANSETRON 4 MG/1
4 TABLET, ORALLY DISINTEGRATING ORAL ONCE
Status: COMPLETED | OUTPATIENT
Start: 2020-01-18 | End: 2020-01-18

## 2020-01-18 RX ADMIN — ONDANSETRON 4 MG: 4 TABLET, ORALLY DISINTEGRATING ORAL at 13:56

## 2020-01-18 RX ADMIN — DICYCLOMINE HYDROCHLORIDE 10 MG: 10 INJECTION INTRAMUSCULAR at 15:38

## 2020-01-18 ASSESSMENT — ENCOUNTER SYMPTOMS
BLOOD IN STOOL: 0
SHORTNESS OF BREATH: 0
CONSTIPATION: 0
NAUSEA: 1
SORE THROAT: 0
COUGH: 0
ABDOMINAL PAIN: 1
VOMITING: 1
DIARRHEA: 1
BACK PAIN: 0

## 2020-01-18 ASSESSMENT — PAIN DESCRIPTION - DESCRIPTORS: DESCRIPTORS: CRAMPING

## 2020-01-18 ASSESSMENT — PAIN DESCRIPTION - PAIN TYPE: TYPE: ACUTE PAIN

## 2020-01-18 ASSESSMENT — PAIN SCALES - GENERAL: PAINLEVEL_OUTOF10: 8

## 2020-01-18 ASSESSMENT — PAIN DESCRIPTION - LOCATION: LOCATION: ABDOMEN

## 2020-01-18 NOTE — ED PROVIDER NOTES
1/2/20 2/1/20  Davy Tamez MD   metFORMIN (GLUCOPHAGE) 1000 MG tablet take 1 tablet by mouth twice a day with food 1/2/20   Davy Tamez MD   Lancets MISC 1 each by Does not apply route 2 times daily 10/17/19   Nigel Tony MD   blood glucose monitor strips Test 1-2/day 4/18/19   Nigel Tony MD   escitalopram (LEXAPRO) 10 MG tablet Take 1 tablet by mouth daily 8/20/18   Hector LoveMD abilio   busPIRone (BUSPAR) 30 MG tablet Take 1 tablet by mouth 2 times daily 9/12/16   Historical Provider, MD   QUEtiapine (SEROQUEL XR) 300 MG XR tablet Take 1 tablet by mouth nightly 6/10/16   Deepti Ornelas DO       REVIEW OFSYSTEMS    (2-9 systems for level 4, 10 or more for level 5)      Review of Systems   Constitutional: Negative for activity change, appetite change, chills, fatigue and fever. HENT: Negative for congestion and sore throat. Respiratory: Negative for cough and shortness of breath. Cardiovascular: Negative for chest pain. Gastrointestinal: Positive for abdominal pain, diarrhea, nausea and vomiting. Negative for blood in stool and constipation. Genitourinary: Negative for dysuria, frequency, hematuria, urgency, vaginal bleeding and vaginal discharge. Musculoskeletal: Negative for back pain. Neurological: Negative for light-headedness and headaches. PHYSICAL EXAM   (up to 7 for level 4, 8 or more forlevel 5)      INITIAL VITALS:   ED Triage Vitals   BP Temp Temp Source Pulse Resp SpO2 Height Weight   01/18/20 1342 01/18/20 1340 01/18/20 1340 01/18/20 1340 01/18/20 1340 01/18/20 1340 01/18/20 1340 01/18/20 1340   116/88 96.8 °F (36 °C) Oral 85 18 98 % 5' 5\" (1.651 m) 223 lb (101.2 kg)       Physical Exam  Vitals signs and nursing note reviewed. Constitutional:       General: She is not in acute distress. Appearance: Normal appearance. She is well-developed. She is not diaphoretic. HENT:      Head: Normocephalic and atraumatic.       Mouth/Throat:      Mouth: Mucous membranes are moist.   Eyes:      General: No scleral icterus. Cardiovascular:      Rate and Rhythm: Normal rate and regular rhythm. Heart sounds: Normal heart sounds. Pulmonary:      Effort: Pulmonary effort is normal. No respiratory distress. Breath sounds: Normal breath sounds. No wheezing or rales. Abdominal:      General: Bowel sounds are normal. There is no distension. Palpations: Abdomen is soft. Tenderness: There is generalized tenderness. There is no guarding or rebound. Skin:     General: Skin is warm and dry. Neurological:      Mental Status: She is alert and oriented to person, place, and time. Psychiatric:         Behavior: Behavior normal.         Thought Content: Thought content normal.         DIFFERENTIAL  DIAGNOSIS     PLAN (LABS / IMAGING / EKG):  Orders Placed This Encounter   Procedures    LIPASE    CBC WITH AUTO DIFFERENTIAL    Comprehensive Metabolic Panel    HCG, QUANTITATIVE, PREGNANCY    Urinalysis Reflex to Culture       MEDICATIONS ORDERED:  Orders Placed This Encounter   Medications    ondansetron (ZOFRAN-ODT) disintegrating tablet 4 mg    dicyclomine (BENTYL) injection 10 mg    ondansetron (ZOFRAN) 4 MG tablet     Sig: Take 1 tablet by mouth every 8 hours as needed for Nausea     Dispense:  20 tablet     Refill:  0    dicyclomine (BENTYL) 10 MG capsule     Sig: Take 1 capsule by mouth 4 times daily as needed (Abdominal cramping)     Dispense:  20 capsule     Refill:  0       Initial MDM/Plan/ED COURSE:    39 y.o. female who presents with complaints of nausea, vomiting, diarrhea and abdominal pain. On exam patient is well-appearing, nontoxic. Vital signs are within normal limits. On physical exam abdomen is soft, diffusely tender but with no guarding, rigidity, rebound tenderness. Cardiac regular rate and rhythm. Lungs clear to auscultation bilaterally. No lower extremity swelling or calf tenderness. No CVA tenderness bilaterally.     Given PROCEDURES:  None    CONSULTS:  None    CRITICAL CARE:  Please see attending note    FINAL IMPRESSION      1.  Gastroenteritis          DISPOSITION / PLAN     DISPOSITION Decision To Discharge 01/18/2020 04:21:14 PM      PATIENT REFERRED TO:  [facility]  [address] [facility] [address]  [phone]  Go to   If symptoms worsen    MD Johnnie [address] [facility] [address]  [phone]    In 3 days        DISCHARGE MEDICATIONS:  Discharge Medication List as of 1/18/2020  4:23 PM      START taking these medications    Details   ondansetron (ZOFRAN) 4 MG tablet Take 1 tablet by mouth every 8 hours as needed for Nausea, Disp-20 tablet, R-0Print      dicyclomine (BENTYL) 10 MG capsule Take 1 capsule by mouth 4 times daily as needed (Abdominal cramping), Disp-20 capsule, R-0Print             Shahid Stinson DO  Emergency Medicine Resident    (Please note that portions of this note were completed with a voice recognition program.Efforts were made to edit the dictations but occasionally words are mis-transcribed.)        Shahid Stinson DO  Resident  01/19/20 2564

## 2020-01-18 NOTE — ED NOTES
Pt c/o abdominal pain. Pt states her stomach is cramping and in a lot of pain.       Derek Castillo, RN  01/18/20 3235

## 2020-01-18 NOTE — ED PROVIDER NOTES
3:30 PM  Received patient in Sign-out from Dr. Hugo Miranda, please see their HPI for full history and physical.    Briefly, this patient is a 39 y.o. female who presented with nausea, vomiting, diarrhea. Symptomatic treatment, anticipate discharge home.       ---End Sign-out---            Brody Amaya MD  01/18/20 9411

## 2020-01-18 NOTE — ED NOTES
Urine sample collected from pt,  labeled and sent to lab for testing.       Niki Warren RN  01/18/20 0879

## 2020-01-30 ENCOUNTER — TELEPHONE (OUTPATIENT)
Dept: OBGYN | Age: 45
End: 2020-01-30

## 2020-01-30 NOTE — TELEPHONE ENCOUNTER
Left voice message that appointment needs to be reschedule due to Dr. Avendaño Sender not in  the office.

## 2020-02-14 ENCOUNTER — TELEPHONE (OUTPATIENT)
Dept: BARIATRICS/WEIGHT MGMT | Age: 45
End: 2020-02-14

## 2020-02-28 ENCOUNTER — HOSPITAL ENCOUNTER (OUTPATIENT)
Dept: ULTRASOUND IMAGING | Age: 45
Discharge: HOME | End: 2020-03-01
Payer: COMMERCIAL

## 2020-02-28 PROCEDURE — 76856 US EXAM PELVIC COMPLETE: CPT

## 2020-03-06 ENCOUNTER — HOSPITAL ENCOUNTER (EMERGENCY)
Age: 45
Discharge: HOME | End: 2020-03-06
Attending: EMERGENCY MEDICINE
Payer: COMMERCIAL

## 2020-03-06 VITALS
DIASTOLIC BLOOD PRESSURE: 84 MMHG | RESPIRATION RATE: 16 BRPM | TEMPERATURE: 97.8 F | HEART RATE: 91 BPM | OXYGEN SATURATION: 97 % | SYSTOLIC BLOOD PRESSURE: 117 MMHG

## 2020-03-06 PROBLEM — R06.81 APNEA: Status: ACTIVE | Noted: 2020-03-06

## 2020-03-06 LAB
-: ABNORMAL
AMORPHOUS: ABNORMAL
BACTERIA: ABNORMAL
BILIRUBIN URINE: NEGATIVE
CASTS UA: ABNORMAL /LPF (ref 0–8)
COLOR: YELLOW
COMMENT UA: ABNORMAL
CRYSTALS, UA: ABNORMAL /HPF
DIRECT EXAM: ABNORMAL
EPITHELIAL CELLS UA: ABNORMAL /HPF (ref 0–5)
GLUCOSE URINE: ABNORMAL
HCG(URINE) PREGNANCY TEST: NEGATIVE
KETONES, URINE: NEGATIVE
LEUKOCYTE ESTERASE, URINE: ABNORMAL
Lab: ABNORMAL
MUCUS: ABNORMAL
NITRITE, URINE: NEGATIVE
OTHER OBSERVATIONS UA: ABNORMAL
PH UA: 5 (ref 5–8)
PROTEIN UA: NEGATIVE
RBC UA: ABNORMAL /HPF (ref 0–4)
RENAL EPITHELIAL, UA: ABNORMAL /HPF
SPECIFIC GRAVITY UA: 1.01 (ref 1–1.03)
SPECIMEN DESCRIPTION: ABNORMAL
TRICHOMONAS: ABNORMAL
TURBIDITY: ABNORMAL
URINE HGB: NEGATIVE
UROBILINOGEN, URINE: NORMAL
WBC UA: ABNORMAL /HPF (ref 0–5)
YEAST: ABNORMAL

## 2020-03-06 PROCEDURE — 87086 URINE CULTURE/COLONY COUNT: CPT

## 2020-03-06 PROCEDURE — 87480 CANDIDA DNA DIR PROBE: CPT

## 2020-03-06 PROCEDURE — 87491 CHLMYD TRACH DNA AMP PROBE: CPT

## 2020-03-06 PROCEDURE — 86403 PARTICLE AGGLUT ANTBDY SCRN: CPT

## 2020-03-06 PROCEDURE — 87660 TRICHOMONAS VAGIN DIR PROBE: CPT

## 2020-03-06 PROCEDURE — 99283 EMERGENCY DEPT VISIT LOW MDM: CPT

## 2020-03-06 PROCEDURE — 87510 GARDNER VAG DNA DIR PROBE: CPT

## 2020-03-06 PROCEDURE — 81001 URINALYSIS AUTO W/SCOPE: CPT

## 2020-03-06 PROCEDURE — 87591 N.GONORRHOEAE DNA AMP PROB: CPT

## 2020-03-06 PROCEDURE — 81025 URINE PREGNANCY TEST: CPT

## 2020-03-06 PROCEDURE — 6370000000 HC RX 637 (ALT 250 FOR IP): Performed by: STUDENT IN AN ORGANIZED HEALTH CARE EDUCATION/TRAINING PROGRAM

## 2020-03-06 RX ORDER — METRONIDAZOLE 500 MG/1
500 TABLET ORAL ONCE
Status: COMPLETED | OUTPATIENT
Start: 2020-03-06 | End: 2020-03-06

## 2020-03-06 RX ORDER — METRONIDAZOLE 500 MG/1
500 TABLET ORAL 2 TIMES DAILY
Qty: 14 TABLET | Refills: 0 | Status: SHIPPED | OUTPATIENT
Start: 2020-03-06 | End: 2020-03-13

## 2020-03-06 RX ORDER — FLUCONAZOLE 150 MG/1
150 TABLET ORAL ONCE
Qty: 1 TABLET | Refills: 0 | Status: SHIPPED | OUTPATIENT
Start: 2020-03-06 | End: 2020-03-06

## 2020-03-06 RX ADMIN — METRONIDAZOLE 500 MG: 500 TABLET, FILM COATED ORAL at 15:15

## 2020-03-06 ASSESSMENT — PAIN DESCRIPTION - LOCATION: LOCATION: VAGINA

## 2020-03-06 ASSESSMENT — PAIN SCALES - GENERAL: PAINLEVEL_OUTOF10: 8

## 2020-03-06 ASSESSMENT — PAIN DESCRIPTION - DESCRIPTORS: DESCRIPTORS: BURNING;THROBBING;SHOOTING

## 2020-03-06 NOTE — ED PROVIDER NOTES
regarding today's visit, however she has 1 we will ensure that she definitely has a referral for these follow-up concerns. Patient denies any vaginal bleeding, but she states her menstrual cycle is irregular and she has been diagnosed with endometriosis in the past.  Plan to do a Pap smear to include urinalysis, urinary pregnancy, vaginitis and gonorrhea and chlamydia labs. DIAGNOSTIC RESULTS / EMERGENCYDEPARTMENT COURSE / MDM     LABS:  Labs Reviewed   VAGINITIS DNA PROBE - Abnormal; Notable for the following components:       Result Value    Direct Exam POSITIVE for Gardnerella vaginalis. (*)     All other components within normal limits   URINE RT REFLEX TO CULTURE - Abnormal; Notable for the following components:    Turbidity UA CLOUDY (*)     Glucose, Ur 3+ (*)     Leukocyte Esterase, Urine TRACE (*)     All other components within normal limits   MICROSCOPIC URINALYSIS - Abnormal; Notable for the following components:    Bacteria, UA FEW (*)     All other components within normal limits   C.TRACHOMATIS N.GONORRHOEAE DNA   CULTURE, URINE   PREGNANCY, URINE         RADIOLOGY:  No results found. EKG  NA     All EKG's are interpreted by the Emergency Department Physicianwho either signs or Co-signs this chart in the absence of a cardiologist.    EMERGENCY DEPARTMENT COURSE:     Patient has positive for bacterial vaginosis, based on patient's symptoms such as itching and scratching and irritation I will also prescribe patient in addition to Flagyl a dose of fluconazole. Patient does have a obstetrician and gynecologist that she can follow-up with. Will discharge patient home with return precautions and encouragement to follow-up. PROCEDURES:  None    CONSULTS:  None    CRITICAL CARE:  Please see attending note    FINAL IMPRESSION      1.  Vaginitis and vulvovaginitis          DISPOSITION / PLAN     DISPOSITION        PATIENT REFERRED TO:  MD Johnnie Maravilla [address]

## 2020-03-07 LAB
CULTURE: ABNORMAL
Lab: ABNORMAL
SPECIMEN DESCRIPTION: ABNORMAL

## 2020-03-09 LAB
C TRACH DNA GENITAL QL NAA+PROBE: NEGATIVE
N. GONORRHOEAE DNA: NEGATIVE
SPECIMEN DESCRIPTION: NORMAL

## 2020-03-09 RX ORDER — DEXAMETHASONE 4 MG/1
TABLET ORAL
Qty: 12 G | Refills: 2 | Status: SHIPPED | OUTPATIENT
Start: 2020-03-09 | End: 2020-04-03

## 2020-03-09 NOTE — TELEPHONE ENCOUNTER
E-scribe request for ENT HFA [address]. Please review and e-scribe if applicable. Pt is on wait list for f/u    Next Visit Date:  Future Appointments   Date Time Provider Lori Champagne   3/11/2020  4:00 PM Yasir Galeano DO [facility] Maintenance   Topic Date Due    Cervical cancer screen  01/14/1998    Annual Wellness Visit (AWV)  04/03/2019    Diabetic foot exam  01/04/2020    Diabetic microalbuminuria test  01/04/2020    Lipid screen  01/04/2020    Diabetic retinal exam  02/04/2020    Hepatitis B vaccine (1 of 3 - Risk 3-dose series) 03/25/2020 (Originally 1/14/1996)    Breast cancer screen  05/01/2020    A1C test (Diabetic or Prediabetic)  01/16/2021    Potassium monitoring  01/18/2021    Creatinine monitoring  01/18/2021    Shingles Vaccine (1 of 2) 01/14/2027    DTaP/Tdap/Td vaccine (3 - Td) 06/08/2028    Flu vaccine  Completed    Pneumococcal 0-64 years Vaccine  Completed    HIV screen  Completed    Hepatitis A vaccine  Aged Out    Hib vaccine  Aged Out    Meningococcal (ACWY) vaccine  Aged Out               (applicable per patient's age: Cancer Screenings, Depression Screening, Fall Risk Screening, Immunizations)    Hemoglobin A1C (%)   Date Value   01/16/2020 7.6   10/17/2019 8.3   01/04/2019 6.7     Microalb/Crt.  Ratio (mcg/mg creat)   Date Value   01/04/2019 CANNOT BE CALCULATED     LDL Cholesterol (mg/dL)   Date Value   01/04/2019 89     AST (U/L)   Date Value   01/18/2020 27     ALT (U/L)   Date Value   01/18/2020 44 (H)     BUN (mg/dL)   Date Value   01/18/2020 10      (goal A1C is < 7)   (goal LDL is <100) need 30-50% reduction from baseline     BP Readings from Last 3 Encounters:   03/06/20 117/84   01/18/20 116/88   01/16/20 111/84    (goal /80)      All Future Testing planned in [facility]:  Lab Frequency Next Occurrence   Lipid Panel Once 04/25/2020   Microalbumin, Ur Once 04/25/2020   ECHO Complete 2D W Doppler W Color Once 01/16/2020

## 2020-03-10 NOTE — TELEPHONE ENCOUNTER
Refill request for Gabapentin. If appropriate please send medication(s) to patients pharmacy. Next appt: Patient is currently on wait list for provider. Last appt: 01/16/2020    Health Maintenance   Topic Date Due    Cervical cancer screen  01/14/1998    Annual Wellness Visit (AWV)  04/03/2019    Diabetic foot exam  01/04/2020    Diabetic microalbuminuria test  01/04/2020    Lipid screen  01/04/2020    Diabetic retinal exam  02/04/2020    Hepatitis B vaccine (1 of 3 - Risk 3-dose series) 03/25/2020 (Originally 1/14/1996)    Breast cancer screen  05/01/2020    A1C test (Diabetic or Prediabetic)  01/16/2021    Potassium monitoring  01/18/2021    Creatinine monitoring  01/18/2021    Shingles Vaccine (1 of 2) 01/14/2027    DTaP/Tdap/Td vaccine (3 - Td) 06/08/2028    Flu vaccine  Completed    Pneumococcal 0-64 years Vaccine  Completed    HIV screen  Completed    Hepatitis A vaccine  Aged Out    Hib vaccine  Aged Out    Meningococcal (ACWY) vaccine  Aged Out       Hemoglobin A1C (%)   Date Value   01/16/2020 7.6   10/17/2019 8.3   01/04/2019 6.7             ( goal A1C is < 7)   Microalb/Crt.  Ratio (mcg/mg creat)   Date Value   01/04/2019 CANNOT BE CALCULATED     LDL Cholesterol (mg/dL)   Date Value   01/04/2019 89       (goal LDL is <100)   AST (U/L)   Date Value   01/18/2020 27     ALT (U/L)   Date Value   01/18/2020 44 (H)     BUN (mg/dL)   Date Value   01/18/2020 10     BP Readings from Last 3 Encounters:   03/06/20 117/84   01/18/20 116/88   01/16/20 111/84          (goal 120/80)          Patient Active Problem List:     Lumbar back pain     Depression     Fibromyalgia     Anxiety     Type 2 diabetes mellitus without complication (HCC)     Iron deficiency anemia     Mixed hyperlipidemia     Rib pain on right side     Facet degeneration of lumbar region     Lipoma of chest wall     Bipolar disorder ([address]     Depressed mood with feeling of loneliness     Essential hypertension     Apnea

## 2020-03-11 RX ORDER — GABAPENTIN 300 MG/1
CAPSULE ORAL
Qty: 180 CAPSULE | Refills: 0 | Status: SHIPPED | OUTPATIENT
Start: 2020-03-11 | End: 2020-04-17

## 2020-03-27 ENCOUNTER — TELEPHONE (OUTPATIENT)
Dept: INTERNAL MEDICINE | Age: 45
End: 2020-03-27

## 2020-03-27 NOTE — TELEPHONE ENCOUNTER
Patient was in [facility]'s ED on 3/6/20 for bacterial vaginosis. She needs an ED follow up and possibly more medication.

## 2020-03-30 NOTE — TELEPHONE ENCOUNTER
pc to pt-- shes states that she does not have any symptoms at this time so she will wait till then end of April for an appt

## 2020-04-03 RX ORDER — DEXAMETHASONE 4 MG/1
TABLET ORAL
Qty: 12 G | Refills: 2 | Status: SHIPPED | OUTPATIENT
Start: 2020-04-03

## 2020-04-13 NOTE — TELEPHONE ENCOUNTER
[facility]     Depressed mood with feeling of loneliness     Essential hypertension     Apnea

## 2020-04-14 RX ORDER — TIZANIDINE 4 MG/1
4 TABLET ORAL EVERY 6 HOURS PRN
Qty: 60 TABLET | Refills: 3 | Status: SHIPPED | OUTPATIENT
Start: 2020-04-14 | End: 2020-08-13 | Stop reason: SDUPTHER

## 2020-04-20 NOTE — TELEPHONE ENCOUNTER
Refill request for Steglatro. If appropriate please send medication(s) to patients pharmacy. Next appt: 04/30/2020      Health Maintenance   Topic Date Due    Hepatitis B vaccine (1 of 3 - Risk 3-dose series) 01/14/1996    Cervical cancer screen  01/14/1998    Annual Wellness Visit (AWV)  04/03/2019    Diabetic foot exam  01/04/2020    Diabetic microalbuminuria test  01/04/2020    Lipid screen  01/04/2020    Diabetic retinal exam  02/04/2020    Breast cancer screen  05/01/2020    A1C test (Diabetic or Prediabetic)  01/16/2021    Potassium monitoring  01/18/2021    Creatinine monitoring  01/18/2021    DTaP/Tdap/Td vaccine (3 - Td) 06/08/2028    Flu vaccine  Completed    Pneumococcal 0-64 years Vaccine  Completed    HIV screen  Completed    Hepatitis A vaccine  Aged Out    Hib vaccine  Aged Out    Meningococcal (ACWY) vaccine  Aged Out       Hemoglobin A1C (%)   Date Value   01/16/2020 7.6   10/17/2019 8.3   01/04/2019 6.7             ( goal A1C is < 7)   Microalb/Crt.  Ratio (mcg/mg creat)   Date Value   01/04/2019 CANNOT BE CALCULATED     LDL Cholesterol (mg/dL)   Date Value   01/04/2019 89       (goal LDL is <100)   AST (U/L)   Date Value   01/18/2020 27     ALT (U/L)   Date Value   01/18/2020 44 (H)     BUN (mg/dL)   Date Value   01/18/2020 10     BP Readings from Last 3 Encounters:   03/06/20 117/84   01/18/20 116/88   01/16/20 111/84          (goal 120/80)          Patient Active Problem List:     Lumbar back pain     Depression     Fibromyalgia     Anxiety     Type 2 diabetes mellitus without complication (HCC)     Iron deficiency anemia     Mixed hyperlipidemia     Rib pain on right side     Facet degeneration of lumbar region     Lipoma of chest wall     Bipolar disorder ([address]     Depressed mood with feeling of loneliness     Essential hypertension     Apnea

## 2020-04-21 RX ORDER — ERTUGLIFLOZIN 5 MG/1
5 TABLET, FILM COATED ORAL DAILY
Qty: 30 TABLET | Refills: 3 | Status: SHIPPED | OUTPATIENT
Start: 2020-04-21 | End: 2020-05-07

## 2020-04-29 ENCOUNTER — TELEPHONE (OUTPATIENT)
Dept: INTERNAL MEDICINE | Age: 45
End: 2020-04-29

## 2020-04-29 NOTE — TELEPHONE ENCOUNTER
PA request received for Steglatro 5mg Tablets    PA processed and submitted to pt insurance, waiting for response in regards to medication coverage

## 2020-05-04 ENCOUNTER — TELEPHONE (OUTPATIENT)
Dept: INTERNAL MEDICINE | Age: 45
End: 2020-05-04

## 2020-05-07 RX ORDER — EMPAGLIFLOZIN 10 MG/1
1 TABLET, FILM COATED ORAL DAILY
Qty: 90 TABLET | Refills: 1 | Status: SHIPPED | OUTPATIENT
Start: 2020-05-07 | End: 2020-11-10

## 2020-05-11 ENCOUNTER — APPOINTMENT (OUTPATIENT)
Dept: GENERAL RADIOLOGY | Age: 45
End: 2020-05-11
Payer: COMMERCIAL

## 2020-05-11 ENCOUNTER — HOSPITAL ENCOUNTER (EMERGENCY)
Age: 45
Discharge: HOME | End: 2020-05-11
Attending: EMERGENCY MEDICINE
Payer: COMMERCIAL

## 2020-05-11 VITALS
TEMPERATURE: 98.5 F | OXYGEN SATURATION: 99 % | WEIGHT: 240 LBS | DIASTOLIC BLOOD PRESSURE: 91 MMHG | SYSTOLIC BLOOD PRESSURE: 133 MMHG | HEART RATE: 72 BPM | HEIGHT: 63 IN | BODY MASS INDEX: 42.52 KG/M2 | RESPIRATION RATE: 17 BRPM

## 2020-05-11 LAB
ABSOLUTE EOS #: 0.15 K/UL (ref 0–0.44)
ABSOLUTE IMMATURE GRANULOCYTE: 0.03 K/UL (ref 0–0.3)
ABSOLUTE LYMPH #: 2.04 K/UL (ref 1.1–3.7)
ABSOLUTE MONO #: 0.49 K/UL (ref 0.1–1.2)
ANION GAP SERPL CALCULATED.3IONS-SCNC: 18 MMOL/L (ref 9–17)
BASOPHILS # BLD: 1 % (ref 0–2)
BASOPHILS ABSOLUTE: 0.06 K/UL (ref 0–0.2)
BUN BLDV-MCNC: 7 MG/DL (ref 6–20)
BUN/CREAT BLD: ABNORMAL (ref 9–20)
CALCIUM SERPL-MCNC: 10 MG/DL (ref 8.6–10.4)
CHLORIDE BLD-SCNC: 102 MMOL/L (ref 98–107)
CO2: 20 MMOL/L (ref 20–31)
CREAT SERPL-MCNC: 0.62 MG/DL (ref 0.5–0.9)
DIFFERENTIAL TYPE: ABNORMAL
EOSINOPHILS RELATIVE PERCENT: 2 % (ref 1–4)
GFR AFRICAN AMERICAN: >60 ML/MIN
GFR NON-AFRICAN AMERICAN: >60 ML/MIN
GFR SERPL CREATININE-BSD FRML MDRD: ABNORMAL ML/MIN/{1.73_M2}
GFR SERPL CREATININE-BSD FRML MDRD: ABNORMAL ML/MIN/{1.73_M2}
GLUCOSE BLD-MCNC: 148 MG/DL (ref 70–99)
HCG QUALITATIVE: NEGATIVE
HCT VFR BLD CALC: 43.6 % (ref 36.3–47.1)
HEMOGLOBIN: 14.5 G/DL (ref 11.9–15.1)
IMMATURE GRANULOCYTES: 0 %
LYMPHOCYTES # BLD: 28 % (ref 24–43)
MAGNESIUM: 1.8 MG/DL (ref 1.6–2.6)
MCH RBC QN AUTO: 27.1 PG (ref 25.2–33.5)
MCHC RBC AUTO-ENTMCNC: 33.3 G/DL (ref 28.4–34.8)
MCV RBC AUTO: 81.3 FL (ref 82.6–102.9)
MONOCYTES # BLD: 7 % (ref 3–12)
NRBC AUTOMATED: 0 PER 100 WBC
PDW BLD-RTO: 15.4 % (ref 11.8–14.4)
PLATELET # BLD: 208 K/UL (ref 138–453)
PLATELET ESTIMATE: ABNORMAL
PMV BLD AUTO: 10.5 FL (ref 8.1–13.5)
POTASSIUM SERPL-SCNC: 4 MMOL/L (ref 3.7–5.3)
RBC # BLD: 5.36 M/UL (ref 3.95–5.11)
RBC # BLD: ABNORMAL 10*6/UL
SEG NEUTROPHILS: 62 % (ref 36–65)
SEGMENTED NEUTROPHILS ABSOLUTE COUNT: 4.52 K/UL (ref 1.5–8.1)
SODIUM BLD-SCNC: 140 MMOL/L (ref 135–144)
TROPONIN INTERP: NORMAL
TROPONIN T: NORMAL NG/ML
TROPONIN, HIGH SENSITIVITY: <6 NG/L (ref 0–14)
TSH SERPL DL<=0.05 MIU/L-ACNC: 0.87 MIU/L (ref 0.3–5)
WBC # BLD: 7.3 K/UL (ref 3.5–11.3)
WBC # BLD: ABNORMAL 10*3/UL

## 2020-05-11 PROCEDURE — 99285 EMERGENCY DEPT VISIT HI MDM: CPT

## 2020-05-11 PROCEDURE — 96375 TX/PRO/DX INJ NEW DRUG ADDON: CPT

## 2020-05-11 PROCEDURE — 96374 THER/PROPH/DIAG INJ IV PUSH: CPT

## 2020-05-11 PROCEDURE — 83735 ASSAY OF MAGNESIUM: CPT

## 2020-05-11 PROCEDURE — 71045 X-RAY EXAM CHEST 1 VIEW: CPT

## 2020-05-11 PROCEDURE — 2580000003 HC RX 258: Performed by: STUDENT IN AN ORGANIZED HEALTH CARE EDUCATION/TRAINING PROGRAM

## 2020-05-11 PROCEDURE — 84703 CHORIONIC GONADOTROPIN ASSAY: CPT

## 2020-05-11 PROCEDURE — 2500000003 HC RX 250 WO HCPCS: Performed by: STUDENT IN AN ORGANIZED HEALTH CARE EDUCATION/TRAINING PROGRAM

## 2020-05-11 PROCEDURE — 80048 BASIC METABOLIC PNL TOTAL CA: CPT

## 2020-05-11 PROCEDURE — 84443 ASSAY THYROID STIM HORMONE: CPT

## 2020-05-11 PROCEDURE — 6360000002 HC RX W HCPCS: Performed by: STUDENT IN AN ORGANIZED HEALTH CARE EDUCATION/TRAINING PROGRAM

## 2020-05-11 PROCEDURE — 84484 ASSAY OF TROPONIN QUANT: CPT

## 2020-05-11 PROCEDURE — 93005 ELECTROCARDIOGRAM TRACING: CPT | Performed by: STUDENT IN AN ORGANIZED HEALTH CARE EDUCATION/TRAINING PROGRAM

## 2020-05-11 PROCEDURE — 85025 COMPLETE CBC W/AUTO DIFF WBC: CPT

## 2020-05-11 RX ORDER — KETOROLAC TROMETHAMINE 15 MG/ML
15 INJECTION, SOLUTION INTRAMUSCULAR; INTRAVENOUS ONCE
Status: COMPLETED | OUTPATIENT
Start: 2020-05-11 | End: 2020-05-11

## 2020-05-11 RX ORDER — 0.9 % SODIUM CHLORIDE 0.9 %
1000 INTRAVENOUS SOLUTION INTRAVENOUS ONCE
Status: COMPLETED | OUTPATIENT
Start: 2020-05-11 | End: 2020-05-11

## 2020-05-11 RX ORDER — BENZONATATE 100 MG/1
100 CAPSULE ORAL 3 TIMES DAILY PRN
Qty: 30 CAPSULE | Refills: 0 | Status: SHIPPED | OUTPATIENT
Start: 2020-05-11 | End: 2020-05-18

## 2020-05-11 RX ORDER — GUAIFENESIN/DEXTROMETHORPHAN 100-10MG/5
5 SYRUP ORAL 3 TIMES DAILY PRN
Qty: 120 ML | Refills: 0 | Status: SHIPPED | OUTPATIENT
Start: 2020-05-11 | End: 2020-05-21

## 2020-05-11 RX ORDER — PROMETHAZINE HYDROCHLORIDE 25 MG/1
25 TABLET ORAL EVERY 6 HOURS PRN
Qty: 20 TABLET | Refills: 0 | Status: SHIPPED | OUTPATIENT
Start: 2020-05-11 | End: 2020-05-18

## 2020-05-11 RX ORDER — ACETAMINOPHEN 500 MG
1000 TABLET ORAL EVERY 6 HOURS PRN
Qty: 30 TABLET | Refills: 0 | Status: SHIPPED | OUTPATIENT
Start: 2020-05-11

## 2020-05-11 RX ORDER — ONDANSETRON 4 MG/1
4 TABLET, ORALLY DISINTEGRATING ORAL EVERY 8 HOURS PRN
Qty: 12 TABLET | Refills: 0 | Status: SHIPPED | OUTPATIENT
Start: 2020-05-11

## 2020-05-11 RX ORDER — ONDANSETRON 2 MG/ML
4 INJECTION INTRAMUSCULAR; INTRAVENOUS ONCE
Status: COMPLETED | OUTPATIENT
Start: 2020-05-11 | End: 2020-05-11

## 2020-05-11 RX ADMIN — KETOROLAC TROMETHAMINE 15 MG: 15 INJECTION, SOLUTION INTRAMUSCULAR; INTRAVENOUS at 15:33

## 2020-05-11 RX ADMIN — ONDANSETRON 4 MG: 2 INJECTION INTRAMUSCULAR; INTRAVENOUS at 15:33

## 2020-05-11 RX ADMIN — SODIUM CHLORIDE 1000 ML: 9 INJECTION, SOLUTION INTRAVENOUS at 15:33

## 2020-05-11 RX ADMIN — FAMOTIDINE 20 MG: 10 INJECTION INTRAVENOUS at 15:33

## 2020-05-11 ASSESSMENT — PAIN SCALES - GENERAL
PAINLEVEL_OUTOF10: 4
PAINLEVEL_OUTOF10: 8

## 2020-05-11 ASSESSMENT — PAIN DESCRIPTION - PAIN TYPE: TYPE: ACUTE PAIN

## 2020-05-11 ASSESSMENT — PAIN DESCRIPTION - LOCATION: LOCATION: CHEST

## 2020-05-11 NOTE — ED PROVIDER NOTES
[facility]     Emergency Department     Faculty Note/ Attestation      Pt Name: Kady Matthews                                       MRN: [id number]  [place] 1975  Date of evaluation: 5/11/2020  Patients PCP:    Ginna Saini MD    Attestation  I performed a history and physical examination of the patient/ or directly observed  and discussed management with the resident. I reviewed the residents note and agree with the documented findings and plan of care. Any areas of disagreement are noted on the chart. I was personally present for the key portions of any procedures. I have documented in the chart those procedures where I was not present during the key portions. I have reviewed the emergency nurses triage note. I agree with the chief complaint, past medical history, past surgical history, allergies, medications, social and family history as documented unless otherwise noted below. For Physician Assistant/ Nurse Practitioner cases/documentation I have personally evaluated this patient and have completed at least one if not all key elements of the E/M (history, physical exam, and MDM). Additional findings are as noted. Initial Screens:             Vitals:    Vitals:    05/11/20 1245 05/11/20 1256   BP:  135/89   Pulse:  81   Resp:  21   Temp: 97.7 °F (36.5 °C) 98.5 °F (36.9 °C)   TempSrc: Oral Oral   SpO2:  100%   Weight:  240 lb (108.9 kg)   Height:  5' 3\" (1.6 m)       Chief Complaint      Chief Complaint   Patient presents with    Shortness of Breath    Cough    Chest Pain    Nausea & Vomiting    Diarrhea          height is 5' 3\" (1.6 m) and weight is 240 lb (108.9 kg). Her oral temperature is 98.5 °F (36.9 °C). Her blood pressure is 135/89 and her pulse is 81. Her respiration is 21 and oxygen saturation is 100%.             DIAGNOSTIC RESULTS       RADIOLOGY:   XR CHEST PORTABLE    (Results Pending)         LABS:  Labs Reviewed   BASIC METABOLIC PANEL   CBC WITH AUTO DIFFERENTIAL   MAGNESIUM   TROPONIN   TSH WITH REFLEX         EMERGENCY DEPARTMENT COURSE:     -------------------------      BP: 135/89, Temp: 98.5 °F (36.9 °C), Pulse: 81, Resp: 21    System Problem List     Patient Active Problem List   Diagnosis    Lumbar back pain    Depression    Fibromyalgia    Anxiety    Type 2 diabetes mellitus without complication (HCC)    Iron deficiency anemia    Mixed hyperlipidemia    Rib pain on right side    Facet degeneration of lumbar region    Lipoma of chest wall    Bipolar disorder ([facility] [address]    Depressed mood with feeling of loneliness    Essential hypertension    Apnea         Comments  Chronic Prob List noted          Benoit MD, F.A.C.E.P.   Attending Emergency Physician           Reyes Worley MD  05/11/20 [id number]

## 2020-05-11 NOTE — TELEPHONE ENCOUNTER
Request for symbicort. Next Visit Date:  Future Appointments   Date Time Provider Lori Diezi   5/15/2020  9:15 AM Renetta Pacheco MD [address] [facility]   Topic Date Due    Hepatitis B vaccine (1 of 3 - Risk 3-dose series) 01/14/1996    Cervical cancer screen  01/14/1998    Annual Wellness Visit (AWV)  04/03/2019    Diabetic foot exam  01/04/2020    Diabetic microalbuminuria test  01/04/2020    Lipid screen  01/04/2020    Diabetic retinal exam  02/04/2020    Breast cancer screen  05/01/2020    A1C test (Diabetic or Prediabetic)  01/16/2021    Potassium monitoring  01/18/2021    Creatinine monitoring  01/18/2021    DTaP/Tdap/Td vaccine (3 - Td) 06/08/2028    Flu vaccine  Completed    Pneumococcal 0-64 years Vaccine  Completed    HIV screen  Completed    Hepatitis A vaccine  Aged Out    Hib vaccine  Aged Out    Meningococcal (ACWY) vaccine  Aged Out       Hemoglobin A1C (%)   Date Value   01/16/2020 7.6   10/17/2019 8.3   01/04/2019 6.7             ( goal A1C is < 7)   Microalb/Crt.  Ratio (mcg/mg creat)   Date Value   01/04/2019 CANNOT BE CALCULATED     LDL Cholesterol (mg/dL)   Date Value   01/04/2019 89       (goal LDL is <100)   AST (U/L)   Date Value   01/18/2020 27     ALT (U/L)   Date Value   01/18/2020 44 (H)     BUN (mg/dL)   Date Value   01/18/2020 10     BP Readings from Last 3 Encounters:   05/11/20 135/89   03/06/20 117/84   01/18/20 116/88          (goal 120/80)    All Future Testing planned in [facility]  Lab Frequency Next Occurrence   Lipid Panel Once 08/04/2020   Microalbumin, Ur Once 08/04/2020   ECHO Complete 2D W Doppler W Color Once 01/16/2020         Patient Active Problem List:     Lumbar back pain     Depression     Fibromyalgia     Anxiety     Type 2 diabetes mellitus without complication (HCC)     Iron deficiency anemia     Mixed hyperlipidemia     Rib pain on right side     Facet degeneration of lumbar region     Lipoma of chest wall

## 2020-05-12 ENCOUNTER — CARE COORDINATION (OUTPATIENT)
Dept: CARE COORDINATION | Age: 45
End: 2020-05-12

## 2020-05-12 RX ORDER — BUDESONIDE AND FORMOTEROL FUMARATE DIHYDRATE 160; 4.5 UG/1; UG/1
2 AEROSOL RESPIRATORY (INHALATION) 2 TIMES DAILY
Qty: 3 INHALER | Refills: 1 | Status: SHIPPED | OUTPATIENT
Start: 2020-05-12

## 2020-05-12 ASSESSMENT — ENCOUNTER SYMPTOMS
CHEST TIGHTNESS: 1
VOMITING: 1
DIARRHEA: 1
SHORTNESS OF BREATH: 1
NAUSEA: 1
ABDOMINAL PAIN: 0
EYE DISCHARGE: 0
COUGH: 1
EYE REDNESS: 0

## 2020-05-12 NOTE — ED PROVIDER NOTES
[facility]  ED  Emergency Department Encounter  Emergency Medicine Resident     Pt Name: Sujey Rosales  MRN: [id number]  [place] 1975  Date of evaluation: 20  PCP:  Shannon Winters MD    [address] [place] [address]       Chief Complaint   Patient presents with    Shortness of Breath    Cough    Chest Pain    Nausea & Vomiting    Diarrhea       HISTORY OFPRESENT ILLNESS  (Location/Symptom, Timing/Onset, Context/Setting, Quality, Duration, Modifying Factors,Severity.)      Sujey Rosales is a 39 y.o. female who presents with shortness of breath, cough, chest pain, nausea, vomiting, diarrhea. Is been ongoing for the past 2 days gradually worsening. Patient with history of COPD and takes multiple inhalers as needed. States her shortness of breath has been gradually worsening to the point where she cannot walk as far she used to. Also complaining of right-sided chest pain. Pain is a 8 out of 10. Intermittent. Worse with deep breath, worse with movement. Has had a couple episodes of nausea and vomiting nonbilious nonbloody. Decreased oral intake. Concern that 1 of her relatives might be positive for COVID. PAST MEDICAL / SURGICAL / SOCIAL / FAMILY HISTORY      has a past medical history of Alcohol abuse, Anxiety, Arthritis, Asthma, Back pain, chronic, Cerebral artery occlusion with cerebral infarction ([address] Colitis, Depression, Diabetic neuropathy ([address] Diarrhea, Diarrhea, Drug abuse ([address] Eye injury, Fibromyalgia, Full dentures, Full dentures, H/O degenerative disc disease, Hidradenitis suppurativa, Hypertension, IBS (irritable bowel syndrome), Lipoma, Lump of breast, left, Migraine, Mixed hyperlipidemia, Numbness, Snores, Type 2 diabetes mellitus without complication ([address] and Wears glasses. has a past surgical history that includes  section; Spinal fixation surgery with implant; orthopedic surgery (Bilateral);  Upper gastrointestinal endoscopy (12-29-15); other for level 4, 8 or more for level 5)     INITIAL VITALS:    height is 5' 3\" (1.6 m) and weight is 240 lb (108.9 kg). Her oral temperature is 98.5 °F (36.9 °C). Her blood pressure is 133/91 (abnormal) and her pulse is 72. Her respiration is 17 and oxygen saturation is 99%. Physical Exam  Vitals signs and nursing note reviewed. Constitutional:       Appearance: She is well-developed. HENT:      Head: Normocephalic and atraumatic. Nose: Nose normal.      Mouth/Throat:      Mouth: Mucous membranes are moist.   Eyes:      General: No scleral icterus. Conjunctiva/sclera: Conjunctivae normal.      Pupils: Pupils are equal, round, and reactive to light. Neck:      Musculoskeletal: Neck supple. Trachea: No tracheal deviation. Cardiovascular:      Rate and Rhythm: Normal rate and regular rhythm. Heart sounds: Normal heart sounds. No murmur. No friction rub. No gallop. Pulmonary:      Effort: Pulmonary effort is normal. No respiratory distress. Breath sounds: Normal breath sounds. No wheezing or rales. Comments: Clear breath sounds bilaterally, no rales no stridor no rhonchi no wheezing. Not tachypneic. Tender to palpation over right chest.  Abdominal:      General: Bowel sounds are normal. There is no distension. Palpations: Abdomen is soft. There is no mass. Tenderness: There is no abdominal tenderness. There is no guarding or rebound. Musculoskeletal: Normal range of motion. Skin:     General: Skin is warm and dry. Findings: No erythema or rash. Neurological:      Mental Status: She is alert and oriented to person, place, and time.    Psychiatric:         Behavior: Behavior normal.         DIFFERENTIAL  DIAGNOSIS     PLAN (LABS / IMAGING / EKG):  Orders Placed This Encounter   Procedures    XR CHEST PORTABLE    CBC Auto Differential    Basic Metabolic Panel    Magnesium    Troponin    TSH with Reflex    HCG Qualitative, Serum    EKG 12 Lead MEDICATIONS ORDERED:  Orders Placed This Encounter   Medications    ketorolac (TORADOL) injection 15 mg    0.9 % sodium chloride bolus    ondansetron (ZOFRAN) injection 4 mg    famotidine (PEPCID) injection 20 mg    acetaminophen (APAP EXTRA STRENGTH) 500 MG tablet     Sig: Take 2 tablets by mouth every 6 hours as needed for Pain     Dispense:  30 tablet     Refill:  0    ondansetron (ZOFRAN ODT) 4 MG disintegrating tablet     Sig: Take 1 tablet by mouth every 8 hours as needed for Nausea     Dispense:  12 tablet     Refill:  0    benzonatate (TESSALON PERLES) 100 MG capsule     Sig: Take 1 capsule by mouth 3 times daily as needed for Cough     Dispense:  30 capsule     Refill:  0    guaiFENesin-dextromethorphan (ROBITUSSIN DM) 100-10 MG/5ML syrup     Sig: Take 5 mLs by mouth 3 times daily as needed for Cough     Dispense:  120 mL     Refill:  0    promethazine (PHENERGAN) 25 MG tablet     Sig: Take 1 tablet by mouth every 6 hours as needed for Nausea WARNING:  May cause drowsiness. May impair ability to operate vehicles or machinery. Do not use in combination with alcohol. Dispense:  20 tablet     Refill:  0       DDX: PE versus pneumonia versus anxiety versus electrolyte abnormality versus hyperthyroidism versus pregnancy acid reflux versus pleurisy    Initial MDM/Plan: 39 y.o. female who presents with multiple symptoms. Patient well-appearing with normal vital signs and normal pulse ox with ambulation. Certainly possible patient has COVID however will not test as patient does not appear sick enough to be admitted to hospital.  Will get basic laboratory studies including troponins, chest x-ray, TSH. Ultrasound-guided IV was placed by myself after multiple IV attempts by nursing staff. Anticipate discharge.     DIAGNOSTIC RESULTS / EMERGENCY DEPARTMENT COURSE / MDM     LABS:  Labs Reviewed   CBC WITH AUTO DIFFERENTIAL - Abnormal; Notable for the following components:       Result Value low patient well-appearing with normal vital signs and saturating well. Encourage patient to quarantine self for 2 weeks. · Based on the low acuity of concerning symptoms and improvement of symptoms, patient will be discharged with follow up and prescription information listed in the Disposition section. · Patient states they will follow-up with primary care physician and/or return to the emergency department should they experience a change or worsening of symptoms. · Patient will be discharged with resources: summary of visit, instructions, follow-up information, prescriptions if necessary and clinics available. · Patient/ family instructed to read discharge paperwork. All of their questions and concerns were addressed. · Suspicion for any acute life-threatening processes is low. Patient voices understanding of plan. PROCEDURES:  Ultrasound-guided 20-gauge IV and left AC    CONSULTS:  None    CRITICAL CARE:  Please see attending note    FINAL IMPRESSION      1.  Chest pain, unspecified type          DISPOSITION / PLAN     DISPOSITION Decision To Discharge 05/11/2020 04:37:20 PM        PATIENTREFERRED TO:  MD Johnnie [address] [id number]  [phone]    Schedule an appointment as soon as possible for a visit in 1 week        DISCHARGE MEDICATIONS:  Discharge Medication List as of 5/11/2020  4:58 PM      START taking these medications    Details   acetaminophen (APAP EXTRA STRENGTH) 500 MG tablet Take 2 tablets by mouth every 6 hours as needed for Pain, Disp-30 tablet, R-0Print      ondansetron (ZOFRAN ODT) 4 MG disintegrating tablet Take 1 tablet by mouth every 8 hours as needed for Nausea, Disp-12 tablet, R-0Print      benzonatate (TESSALON PERLES) 100 MG capsule Take 1 capsule by mouth 3 times daily as needed for Cough, Disp-30 capsule, R-0Print      guaiFENesin-dextromethorphan (ROBITUSSIN DM) 100-10 MG/5ML syrup Take 5 mLs by mouth 3 times daily as needed for Cough, Disp-120 mL, R-0Print      promethazine (PHENERGAN) 25 MG tablet Take 1 tablet by mouth every 6 hours as needed for Nausea WARNING:  May cause drowsiness. May impair ability to operate vehicles or machinery.   Do not use in combination with alcohol., Disp-20 tablet, R-0Print             Kiarra Pitts DO  EmergencyMedicine Resident    (Please note that portions of this note were completed with a voice recognition program.  Efforts were made to edit the dictations but occasionally words are mis-transcribed.)       Kiarra Pitts DO  Resident  05/12/20 9706

## 2020-05-13 ENCOUNTER — CARE COORDINATION (OUTPATIENT)
Dept: CARE COORDINATION | Age: 45
End: 2020-05-13

## 2020-05-14 LAB
EKG ATRIAL RATE: 77 BPM
EKG P AXIS: 36 DEGREES
EKG P-R INTERVAL: 156 MS
EKG Q-T INTERVAL: 400 MS
EKG QRS DURATION: 76 MS
EKG QTC CALCULATION (BAZETT): 452 MS
EKG R AXIS: 11 DEGREES
EKG T AXIS: 34 DEGREES
EKG VENTRICULAR RATE: 77 BPM

## 2020-05-14 PROCEDURE — 93010 ELECTROCARDIOGRAM REPORT: CPT | Performed by: INTERNAL MEDICINE

## 2020-06-02 ENCOUNTER — TELEMEDICINE (OUTPATIENT)
Dept: INTERNAL MEDICINE | Age: 45
End: 2020-06-02
Payer: COMMERCIAL

## 2020-06-02 VITALS — HEIGHT: 65 IN | WEIGHT: 240 LBS | BODY MASS INDEX: 39.99 KG/M2

## 2020-06-02 PROCEDURE — 99214 OFFICE O/P EST MOD 30 MIN: CPT | Performed by: INTERNAL MEDICINE

## 2020-06-02 RX ORDER — IBUPROFEN 800 MG/1
800 TABLET ORAL
Qty: 90 TABLET | Refills: 0 | Status: SHIPPED | OUTPATIENT
Start: 2020-06-02

## 2020-06-02 NOTE — PROGRESS NOTES
2020    TELEHEALTH EVALUATION -- Audio/Visual (During ACSKM-36 public health emergency)    HPI:    Tj Roy (:  1975) has requested an audio/video evaluation for the following concern(s):  Patient initiated a video visit via my chart to discuss ongoing chronic issues and also to discuss acute tailbone pain after a fall. She fell 2 days ago. Since then her tailbone has been hurting. It hurts mostly when she is trying to sit down or get up. No numbness or weakness around the sacral or pelvic area. No radiculopathy symptoms. She has history of hypertension and diabetes. She states compliance with all her medications. She says her blood sugars have been very well controlled. Recently she went to the ER for possible bronchitis. A chest x-ray was done. Routine labs were normal.  She was discharged home. Possibility of COVID and she was asked to quarantine herself though the test itself was not done. She is cutting back on her smoking and she says she is down to 1 cigarette a day now. She continues to follow-up with a psychiatrist.  She can she is still having some irregular menstrual bleeding and heavy. Pelvic ultrasound and a CBC recently was normal.  She has not followed up with GYN the referral was placed last visit. She is in a sex relationship. Review of Systems   Constitutional: Negative for chills, fatigue and fever. Respiratory: Negative for cough, shortness of breath and wheezing. Cardiovascular: Negative for chest pain, palpitations and leg swelling. Gastrointestinal: Negative for abdominal pain and constipation. Endocrine: Negative for polydipsia and polyuria. Genitourinary: Positive for menstrual problem. Negative for dysuria, frequency and hematuria. Musculoskeletal: Positive for back pain. Negative for arthralgias. Neurological: Negative for dizziness, syncope, weakness, numbness and headaches. Psychiatric/Behavioral: Positive for dysphoric mood. Negative for sleep disturbance. The patient is not nervous/anxious. Prior to Visit Medications    Medication Sig Taking? Authorizing Provider   gabapentin (NEURONTIN) 300 MG capsule Take 2 capsules by mouth 3 times daily for 30 days. Yes Mehul Dorado MD   budesonide-formoterol (SYMBICORT) 160-4.5 MCG/ACT AERO Inhale 2 puffs into the lungs 2 times daily Yes Mehul Dorado MD   amLODIPine (NORVASC) 10 MG tablet take 1 tablet by mouth once daily Yes Mehul Dorado MD   carvedilol (COREG) 3.125 MG tablet take 1 tablet by mouth twice a day Yes Mehul Dorado MD   acetaminophen (APAP EXTRA STRENGTH) 500 MG tablet Take 2 tablets by mouth every 6 hours as needed for Pain Yes Katie Roberson,    ondansetron (ZOFRAN ODT) 4 MG disintegrating tablet Take 1 tablet by mouth every 8 hours as needed for Nausea Yes Katie Roberson,    empagliflozin (JARDIANCE) 10 MG tablet Take 1 tablet by mouth daily Yes Mehul Dorado MD   tiZANidine (ZANAFLEX) 4 MG tablet Take 1 tablet by mouth every 6 hours as needed (for muscle spasms) Yes Mehul Dorado MD   FLOVENT  MCG/ACT inhaler inhale 2 puffs by mouth twice a day Yes Mehul Dorado MD   dicyclomine (BENTYL) 10 MG capsule Take 1 capsule by mouth 4 times daily as needed (Abdominal cramping) Yes Paulette Gallegos,    amphetamine-dextroamphetamine (ADDERALL XR) 30 MG extended release capsule Take 30 mg by mouth daily.  Yes Historical Provider, MD   albuterol sulfate HFA (PROAIR HFA) 108 (90 Base) MCG/ACT inhaler Inhale 2 puffs into the lungs every 6 hours as needed for Wheezing Yes Mehul Dorado MD   tiotropium (SPIRIVA RESPIMAT) 1.25 MCG/ACT AERS inhaler Inhale 2 puffs into the lungs daily Yes Mehul Dorado MD   metFORMIN (GLUCOPHAGE) 1000 MG tablet take 1 tablet by mouth twice a day with food Yes Emeli Phillip MD   Lancets MISC 1 each by Does not apply route 2 times daily Yes Mehul Droado MD   blood glucose monitor strips Test 1-2/day Yes Niesha Brennan

## 2020-06-16 ASSESSMENT — ENCOUNTER SYMPTOMS
CONSTIPATION: 0
BACK PAIN: 1
WHEEZING: 0
ABDOMINAL PAIN: 0
SHORTNESS OF BREATH: 0
COUGH: 0

## 2020-06-29 RX ORDER — GABAPENTIN 300 MG/1
CAPSULE ORAL
Qty: 180 CAPSULE | Refills: 0 | Status: SHIPPED | OUTPATIENT
Start: 2020-06-29 | End: 2020-08-13 | Stop reason: SDUPTHER

## 2020-06-29 NOTE — TELEPHONE ENCOUNTER
Request for gabapentin - med pended. Please fill if appropriate. Next Visit Date:  No future appointments. Health Maintenance   Topic Date Due    Hepatitis B vaccine (1 of 3 - Risk 3-dose series) 01/14/1996    Cervical cancer screen  01/14/1998    Annual Wellness Visit (AWV)  04/03/2019    Diabetic foot exam  01/04/2020    Diabetic microalbuminuria test  01/04/2020    Lipid screen  01/04/2020    Diabetic retinal exam  02/04/2020    Breast cancer screen  05/01/2020    A1C test (Diabetic or Prediabetic)  01/16/2021    Potassium monitoring  05/11/2021    Creatinine monitoring  05/11/2021    DTaP/Tdap/Td vaccine (3 - Td) 06/08/2028    Flu vaccine  Completed    Pneumococcal 0-64 years Vaccine  Completed    HIV screen  Completed    Hepatitis A vaccine  Aged Out    Hib vaccine  Aged Out    Meningococcal (ACWY) vaccine  Aged Out       Hemoglobin A1C (%)   Date Value   01/16/2020 7.6   10/17/2019 8.3   01/04/2019 6.7             ( goal A1C is < 7)   Microalb/Crt.  Ratio (mcg/mg creat)   Date Value   01/04/2019 CANNOT BE CALCULATED     LDL Cholesterol (mg/dL)   Date Value   01/04/2019 89       (goal LDL is <100)   AST (U/L)   Date Value   01/18/2020 27     ALT (U/L)   Date Value   01/18/2020 44 (H)     BUN (mg/dL)   Date Value   05/11/2020 7     BP Readings from Last 3 Encounters:   05/11/20 (!) 133/91   03/06/20 117/84   01/18/20 116/88          (goal 120/80)    All Future Testing planned in [facility]  Lab Frequency Next Occurrence   Lipid Panel Once 08/04/2020   Microalbumin, Ur Once 08/04/2020   ECHO Complete 2D W Doppler W Color Once 01/16/2020   Hemoglobin A1C Once 09/03/2020   Microalbumin / Creatinine Urine Ratio Once 09/03/2020   Lipid Panel Once 09/03/2020   XR SACRUM COCCYX (MIN 2 VIEWS) Once 09/17/2020         Patient Active Problem List:     Lumbar back pain     Depression     Fibromyalgia     Anxiety     Type 2 diabetes mellitus without complication (HCC)     Iron deficiency anemia

## 2020-08-13 RX ORDER — TIZANIDINE 4 MG/1
4 TABLET ORAL EVERY 6 HOURS PRN
Qty: 60 TABLET | Refills: 3 | Status: SHIPPED | OUTPATIENT
Start: 2020-08-13

## 2020-08-13 RX ORDER — GABAPENTIN 300 MG/1
CAPSULE ORAL
Qty: 180 CAPSULE | Refills: 0 | Status: SHIPPED | OUTPATIENT
Start: 2020-08-13 | End: 2020-09-18

## 2020-08-13 NOTE — TELEPHONE ENCOUNTER
Request for tizanidine, gabapentin - meds pended. Please fill if appropriate. Next Visit Date:  No future appointments. Health Maintenance   Topic Date Due    Hepatitis B vaccine (1 of 3 - Risk 3-dose series) 01/14/1996    Cervical cancer screen  01/14/1998    Annual Wellness Visit (AWV)  04/03/2019    Diabetic foot exam  01/04/2020    Diabetic microalbuminuria test  01/04/2020    Lipid screen  01/04/2020    Diabetic retinal exam  02/04/2020    Breast cancer screen  05/01/2020    Flu vaccine (1) 09/01/2020    A1C test (Diabetic or Prediabetic)  01/16/2021    Potassium monitoring  05/11/2021    Creatinine monitoring  05/11/2021    DTaP/Tdap/Td vaccine (3 - Td) 06/08/2028    Pneumococcal 0-64 years Vaccine  Completed    HIV screen  Completed    Hepatitis A vaccine  Aged Out    Hib vaccine  Aged Out    Meningococcal (ACWY) vaccine  Aged Out       Hemoglobin A1C (%)   Date Value   01/16/2020 7.6   10/17/2019 8.3   01/04/2019 6.7             ( goal A1C is < 7)   Microalb/Crt.  Ratio (mcg/mg creat)   Date Value   01/04/2019 CANNOT BE CALCULATED     LDL Cholesterol (mg/dL)   Date Value   01/04/2019 89       (goal LDL is <100)   AST (U/L)   Date Value   01/18/2020 27     ALT (U/L)   Date Value   01/18/2020 44 (H)     BUN (mg/dL)   Date Value   05/11/2020 7     BP Readings from Last 3 Encounters:   05/11/20 (!) 133/91   03/06/20 117/84   01/18/20 116/88          (goal 120/80)    All Future Testing planned in [facility]  Lab Frequency Next Occurrence   Lipid Panel Once 11/12/2020   Microalbumin, Ur Once 11/12/2020   ECHO Complete 2D W Doppler W Color Once 01/16/2020   Hemoglobin A1C Once 09/03/2020   Microalbumin / Creatinine Urine Ratio Once 09/03/2020   Lipid Panel Once 09/03/2020   XR SACRUM COCCYX (MIN 2 VIEWS) Once 09/17/2020         Patient Active Problem List:     Lumbar back pain     Depression     Fibromyalgia     Anxiety     Type 2 diabetes mellitus without complication (HCC)     Iron deficiency anemia     Mixed hyperlipidemia     Rib pain on right side     Facet degeneration of lumbar region     Lipoma of chest wall     Bipolar disorder (HCC)     Depressed mood with feeling of loneliness     Essential hypertension     Apnea

## 2020-08-24 ENCOUNTER — TELEPHONE (OUTPATIENT)
Dept: INTERNAL MEDICINE | Age: 45
End: 2020-08-24

## 2020-09-11 ENCOUNTER — TELEPHONE (OUTPATIENT)
Dept: INTERNAL MEDICINE | Age: 45
End: 2020-09-11

## 2020-09-11 NOTE — LETTER
[address] [address]  Phone: [phone]  Fax: [phone]    Autumn Granado MD        September 11, 2020    [facility] [address] [place] [address]      Dear Massimo Lester: We are sending this letter because your PCP ordered [facility] for you to have done at your last visit here and they have not yet been completed. If you can please come to our office on the 2nd floor to  your orders to have them compelted. If you do not have a follow-up appointment scheduled you can either contact the office to make an appointment with us or you can make one when you come in to pick-up your orders. If you have any questions or concerns, please don't hesitate to call.     Sincerely,        Autumn Granado MD

## 2020-09-16 NOTE — TELEPHONE ENCOUNTER
Request for gabapentin - med pended. Please fill if appropriate. Next Visit Date:  No future appointments. Health Maintenance   Topic Date Due    Hepatitis B vaccine (1 of 3 - Risk 3-dose series) 01/14/1996    Cervical cancer screen  01/14/1998    Annual Wellness Visit (AWV)  04/03/2019    Diabetic foot exam  01/04/2020    Diabetic microalbuminuria test  01/04/2020    Lipid screen  01/04/2020    Diabetic retinal exam  02/04/2020    Breast cancer screen  05/01/2020    Flu vaccine (1) 09/01/2020    A1C test (Diabetic or Prediabetic)  01/16/2021    Potassium monitoring  05/11/2021    Creatinine monitoring  05/11/2021    DTaP/Tdap/Td vaccine (3 - Td) 06/08/2028    Pneumococcal 0-64 years Vaccine  Completed    HIV screen  Completed    Hepatitis A vaccine  Aged Out    Hib vaccine  Aged Out    Meningococcal (ACWY) vaccine  Aged Out       Hemoglobin A1C (%)   Date Value   01/16/2020 7.6   10/17/2019 8.3   01/04/2019 6.7             ( goal A1C is < 7)   Microalb/Crt.  Ratio (mcg/mg creat)   Date Value   01/04/2019 CANNOT BE CALCULATED     LDL Cholesterol (mg/dL)   Date Value   01/04/2019 89       (goal LDL is <100)   AST (U/L)   Date Value   01/18/2020 27     ALT (U/L)   Date Value   01/18/2020 44 (H)     BUN (mg/dL)   Date Value   05/11/2020 7     BP Readings from Last 3 Encounters:   05/11/20 (!) 133/91   03/06/20 117/84   01/18/20 116/88          (goal 120/80)    All Future Testing planned in [facility]  Lab Frequency Next Occurrence   Lipid Panel Once 11/12/2020   Microalbumin, Ur Once 11/12/2020   ECHO Complete 2D W Doppler W Color Once 01/16/2020   Hemoglobin A1C Once 12/11/2020   Microalbumin / Creatinine Urine Ratio Once 12/11/2020   Lipid Panel Once 12/11/2020   XR SACRUM COCCYX (MIN 2 VIEWS) Once 09/17/2020         Patient Active Problem List:     Lumbar back pain     Depression     Fibromyalgia     Anxiety     Type 2 diabetes mellitus without complication (HCC)     Iron deficiency anemia Mixed hyperlipidemia     Rib pain on right side     Facet degeneration of lumbar region     Lipoma of chest wall     Bipolar disorder (HCC)     Depressed mood with feeling of loneliness     Essential hypertension     Apnea

## 2020-09-18 RX ORDER — GABAPENTIN 300 MG/1
CAPSULE ORAL
Qty: 180 CAPSULE | Refills: 0 | Status: SHIPPED | OUTPATIENT
Start: 2020-09-18 | End: 2020-10-18

## 2020-12-14 NOTE — TELEPHONE ENCOUNTER
complication (HCC)     Iron deficiency anemia     Mixed hyperlipidemia     Rib pain on right side     Facet degeneration of lumbar region     Lipoma of chest wall     Bipolar disorder ([address]     Depressed mood with feeling of loneliness     Essential hypertension     Apnea

## 2020-12-15 RX ORDER — CARVEDILOL 3.12 MG/1
TABLET ORAL
Qty: 180 TABLET | Refills: 0 | Status: SHIPPED | OUTPATIENT
Start: 2020-12-15

## 2020-12-15 RX ORDER — AMLODIPINE BESYLATE 10 MG/1
TABLET ORAL
Qty: 90 TABLET | Refills: 0 | Status: SHIPPED | OUTPATIENT
Start: 2020-12-15

## 2020-12-21 ENCOUNTER — TELEPHONE (OUTPATIENT)
Dept: INTERNAL MEDICINE | Age: 45
End: 2020-12-21

## 2020-12-21 NOTE — LETTER
[address]  [address] [facility] [address]  Phone: [phone]  Fax: [phone]    Federico Brown MD        December 21, 2020    [address] [place] [address]      Dear Lynda Plants: This letter is a reminder that you may have diagnostic testing that has not been completed. It is important to your well-being that these test(s) are performed. Please find the outstanding test(s) attached. If you could please have these completed before your next appointment. You can have the test completed at any [facility] facility or Lab. Please see the order for scheduling instructions. Otherwise can be done at any [facility]. Please call our office at Dept: [phone] for additional information on the outstanding tests or let us know if they have been completed so we may update your chart. If you have any questions or concerns, please don't hesitate to call.     Sincerely,        Federico Brown MD

## 2022-10-15 ENCOUNTER — APPOINTMENT (OUTPATIENT)
Dept: GENERAL RADIOLOGY | Age: 47
End: 2022-10-15
Payer: MEDICARE

## 2022-10-15 ENCOUNTER — APPOINTMENT (OUTPATIENT)
Dept: CT IMAGING | Age: 47
End: 2022-10-15
Payer: MEDICARE

## 2022-10-15 ENCOUNTER — HOSPITAL ENCOUNTER (EMERGENCY)
Age: 47
Discharge: HOME | End: 2022-10-15
Attending: EMERGENCY MEDICINE
Payer: MEDICARE

## 2022-10-15 VITALS
WEIGHT: 193 LBS | SYSTOLIC BLOOD PRESSURE: 151 MMHG | HEART RATE: 74 BPM | TEMPERATURE: 97 F | BODY MASS INDEX: 32.12 KG/M2 | DIASTOLIC BLOOD PRESSURE: 106 MMHG | OXYGEN SATURATION: 99 % | RESPIRATION RATE: 19 BRPM

## 2022-10-15 DIAGNOSIS — R42 VERTIGO: Primary | ICD-10-CM

## 2022-10-15 LAB
ABSOLUTE EOS #: 0.58 K/UL (ref 0–0.44)
ABSOLUTE IMMATURE GRANULOCYTE: 0.03 K/UL (ref 0–0.3)
ABSOLUTE LYMPH #: 2.07 K/UL (ref 1.1–3.7)
ABSOLUTE MONO #: 0.4 K/UL (ref 0.1–1.2)
ANION GAP SERPL CALCULATED.3IONS-SCNC: 15 MMOL/L (ref 9–17)
BASOPHILS # BLD: 1 % (ref 0–2)
BASOPHILS ABSOLUTE: 0.06 K/UL (ref 0–0.2)
BUN BLDV-MCNC: 7 MG/DL (ref 6–20)
CALCIUM SERPL-MCNC: 9.2 MG/DL (ref 8.6–10.4)
CHLORIDE BLD-SCNC: 101 MMOL/L (ref 98–107)
CO2: 18 MMOL/L (ref 20–31)
CREAT SERPL-MCNC: 0.61 MG/DL (ref 0.5–0.9)
EOSINOPHILS RELATIVE PERCENT: 7 % (ref 1–4)
FLU A ANTIGEN: NEGATIVE
FLU B ANTIGEN: NEGATIVE
GFR SERPL CREATININE-BSD FRML MDRD: >60 ML/MIN/1.73M2
GLUCOSE BLD-MCNC: 213 MG/DL (ref 65–105)
GLUCOSE BLD-MCNC: 227 MG/DL (ref 70–99)
HCT VFR BLD CALC: 43.1 % (ref 36.3–47.1)
HEMOGLOBIN: 14.9 G/DL (ref 11.9–15.1)
IMMATURE GRANULOCYTES: 0 %
LYMPHOCYTES # BLD: 24 % (ref 24–43)
MCH RBC QN AUTO: 29.7 PG (ref 25.2–33.5)
MCHC RBC AUTO-ENTMCNC: 34.6 G/DL (ref 28.4–34.8)
MCV RBC AUTO: 86 FL (ref 82.6–102.9)
MONOCYTES # BLD: 5 % (ref 3–12)
NRBC AUTOMATED: 0 PER 100 WBC
PDW BLD-RTO: 12.2 % (ref 11.8–14.4)
PLATELET # BLD: 298 K/UL (ref 138–453)
PMV BLD AUTO: 10.3 FL (ref 8.1–13.5)
POTASSIUM SERPL-SCNC: 3.8 MMOL/L (ref 3.7–5.3)
PRO-BNP: <20 PG/ML
RBC # BLD: 5.01 M/UL (ref 3.95–5.11)
SARS-COV-2, RAPID: NOT DETECTED
SEG NEUTROPHILS: 63 % (ref 36–65)
SEGMENTED NEUTROPHILS ABSOLUTE COUNT: 5.47 K/UL (ref 1.5–8.1)
SODIUM BLD-SCNC: 134 MMOL/L (ref 135–144)
SPECIMEN DESCRIPTION: NORMAL
TROPONIN, HIGH SENSITIVITY: <6 NG/L (ref 0–14)
WBC # BLD: 8.6 K/UL (ref 3.5–11.3)

## 2022-10-15 PROCEDURE — 6370000000 HC RX 637 (ALT 250 FOR IP)

## 2022-10-15 PROCEDURE — 6360000004 HC RX CONTRAST MEDICATION

## 2022-10-15 PROCEDURE — 80048 BASIC METABOLIC PNL TOTAL CA: CPT

## 2022-10-15 PROCEDURE — 99285 EMERGENCY DEPT VISIT HI MDM: CPT

## 2022-10-15 PROCEDURE — 85025 COMPLETE CBC W/AUTO DIFF WBC: CPT

## 2022-10-15 PROCEDURE — 6370000000 HC RX 637 (ALT 250 FOR IP): Performed by: EMERGENCY MEDICINE

## 2022-10-15 PROCEDURE — 70498 CT ANGIOGRAPHY NECK: CPT

## 2022-10-15 PROCEDURE — 87804 INFLUENZA ASSAY W/OPTIC: CPT

## 2022-10-15 PROCEDURE — 70450 CT HEAD/BRAIN W/O DYE: CPT

## 2022-10-15 PROCEDURE — 83880 ASSAY OF NATRIURETIC PEPTIDE: CPT

## 2022-10-15 PROCEDURE — 93005 ELECTROCARDIOGRAM TRACING: CPT

## 2022-10-15 PROCEDURE — 71045 X-RAY EXAM CHEST 1 VIEW: CPT

## 2022-10-15 PROCEDURE — 87635 SARS-COV-2 COVID-19 AMP PRB: CPT

## 2022-10-15 PROCEDURE — 82947 ASSAY GLUCOSE BLOOD QUANT: CPT

## 2022-10-15 PROCEDURE — 84484 ASSAY OF TROPONIN QUANT: CPT

## 2022-10-15 RX ORDER — OXYCODONE HYDROCHLORIDE 5 MG/1
5 TABLET ORAL ONCE
Status: COMPLETED | OUTPATIENT
Start: 2022-10-15 | End: 2022-10-15

## 2022-10-15 RX ORDER — MECLIZINE HCL 12.5 MG/1
25 TABLET ORAL 3 TIMES DAILY PRN
Status: DISCONTINUED | OUTPATIENT
Start: 2022-10-15 | End: 2022-10-15 | Stop reason: HOSPADM

## 2022-10-15 RX ORDER — MECLIZINE HCL 12.5 MG/1
12.5 TABLET ORAL 3 TIMES DAILY PRN
Qty: 15 TABLET | Refills: 0 | Status: SHIPPED | OUTPATIENT
Start: 2022-10-15 | End: 2022-10-25

## 2022-10-15 RX ORDER — MECLIZINE HYDROCHLORIDE CHEWABLE TABLETS 25 MG/1
25 TABLET, CHEWABLE ORAL 3 TIMES DAILY PRN
Status: DISCONTINUED | OUTPATIENT
Start: 2022-10-15 | End: 2022-10-15

## 2022-10-15 RX ORDER — ACETAMINOPHEN 500 MG
1000 TABLET ORAL ONCE
Status: COMPLETED | OUTPATIENT
Start: 2022-10-15 | End: 2022-10-15

## 2022-10-15 RX ADMIN — IOPAMIDOL 90 ML: 755 INJECTION, SOLUTION INTRAVENOUS at 19:20

## 2022-10-15 RX ADMIN — MECLIZINE 25 MG: 12.5 TABLET ORAL at 18:37

## 2022-10-15 RX ADMIN — ACETAMINOPHEN 1000 MG: 500 TABLET ORAL at 18:34

## 2022-10-15 RX ADMIN — OXYCODONE 5 MG: 5 TABLET ORAL at 19:07

## 2022-10-15 ASSESSMENT — PAIN - FUNCTIONAL ASSESSMENT
PAIN_FUNCTIONAL_ASSESSMENT: NONE - DENIES PAIN
PAIN_FUNCTIONAL_ASSESSMENT: 0-10

## 2022-10-15 ASSESSMENT — ENCOUNTER SYMPTOMS
EYE PAIN: 0
COLOR CHANGE: 0
ABDOMINAL PAIN: 0
NAUSEA: 1
COUGH: 1
DIARRHEA: 0
SINUS PRESSURE: 0
SORE THROAT: 0
VOMITING: 0
SHORTNESS OF BREATH: 1
CONSTIPATION: 0
EYE DISCHARGE: 0
FACIAL SWELLING: 0
ABDOMINAL DISTENTION: 0

## 2022-10-15 ASSESSMENT — PAIN SCALES - GENERAL: PAINLEVEL_OUTOF10: 8

## 2022-10-15 NOTE — ED PROVIDER NOTES
[facility]  Emergency Department  Emergency Medicine Resident Sign-out     Care of Ean Broosk was assumed from Dr. Tracy Herring and is being seen for Chest Pain (Pneumonia in April) and Dizziness  . The patient's initial evaluation and plan have been discussed with the prior provider who initially evaluated the patient. EMERGENCY DEPARTMENT COURSE / MEDICAL DECISION MAKING:       MEDICATIONS GIVEN:  Orders Placed This Encounter   Medications    DISCONTD: meclizine (ANTIVERT) chewable tablet 25 mg    acetaminophen (TYLENOL) tablet 1,000 mg    DISCONTD: meclizine (ANTIVERT) tablet 25 mg    iopamidol (ISOVUE-370) 76 % injection 90 mL    oxyCODONE (ROXICODONE) immediate release tablet 5 mg    meclizine (ANTIVERT) 12.5 MG tablet     Sig: Take 1 tablet by mouth 3 times daily as needed for Dizziness     Dispense:  15 tablet     Refill:  0       LABS / RADIOLOGY:     Results for orders placed or performed during the hospital encounter of 10/15/22   RAPID INFLUENZA A/B ANTIGENS    Specimen: Nasopharyngeal   Result Value Ref Range    Flu A Antigen NEGATIVE NEGATIVE    Flu B Antigen NEGATIVE NEGATIVE   COVID-19, Rapid    Specimen: Nasopharyngeal Swab   Result Value Ref Range    Specimen Description . NASOPHARYNGEAL SWAB     SARS-CoV-2, Rapid Not Detected Not Detected   Basic Metabolic Panel   Result Value Ref Range    Glucose 227 (H) 70 - 99 mg/dL    BUN 7 6 - 20 mg/dL    Creatinine 0.61 0.50 - 0.90 mg/dL    Est, Glom Filt Rate >60 >60 mL/min/1.73m2    Calcium 9.2 8.6 - 10.4 mg/dL    Sodium 134 (L) 135 - 144 mmol/L    Potassium 3.8 3.7 - 5.3 mmol/L    Chloride 101 98 - 107 mmol/L    CO2 18 (L) 20 - 31 mmol/L    Anion Gap 15 9 - 17 mmol/L   CBC with Auto Differential   Result Value Ref Range    WBC 8.6 3.5 - 11.3 k/uL    RBC 5.01 3.95 - 5.11 m/uL    Hemoglobin 14.9 11.9 - 15.1 g/dL    Hematocrit 43.1 36.3 - 47.1 %    MCV 86.0 82.6 - 102.9 fL    MCH 29.7 25.2 - 33.5 pg    MCHC 34.6 28.4 - 34.8 g/dL    RDW 12.2 11.8 - 14.4 %    Platelets 644 786 - 678 k/uL    MPV 10.3 8.1 - 13.5 fL    NRBC Automated 0.0 0.0 per 100 WBC    Seg Neutrophils 63 36 - 65 %    Lymphocytes 24 24 - 43 %    Monocytes 5 3 - 12 %    Eosinophils % 7 (H) 1 - 4 %    Basophils 1 0 - 2 %    Immature Granulocytes 0 0 %    Segs Absolute 5.47 1.50 - 8.10 k/uL    Absolute Lymph # 2.07 1.10 - 3.70 k/uL    Absolute Mono # 0.40 0.10 - 1.20 k/uL    Absolute Eos # 0.58 (H) 0.00 - 0.44 k/uL    Basophils Absolute 0.06 0.00 - 0.20 k/uL    Absolute Immature Granulocyte 0.03 0.00 - 0.30 k/uL   Brain Natriuretic Peptide   Result Value Ref Range    Pro-BNP <20 <300 pg/mL   Troponin   Result Value Ref Range    Troponin, High Sensitivity <6 0 - 14 ng/L   POC Glucose Fingerstick   Result Value Ref Range    POC Glucose 213 (H) 65 - 105 mg/dL       XR CHEST PORTABLE    Result Date: 10/15/2022  EXAMINATION: ONE XRAY VIEW OF THE CHEST 10/15/2022 6:34 pm COMPARISON: 05/11/2020 HISTORY: Acute chest pain. FINDINGS: Patient is rotated. Normal cardiomediastinal silhouette. Lungs are clear. No pleural effusion or pneumothorax. No acute abnormality. RECENT VITALS:     Temp: 97 °F (36.1 °C),  Heart Rate: 74, Resp: 19, BP: (!) 151/106, SpO2: 99 %    This patient is a 41 y.o. Female with chest pain, shortness of breath for the past 24 to 48 hours. Has had a productive cough for the past 24 hours as well. Was hospitalized with COVID-pneumonia back in April 2022. Has had vertigo-like dizziness is off and on. Is been going on for the past few days. Is worse with movement. Afebrile. History of TIA but no history of MI. No neurodeficits today on exam.  Cardiac work-up grossly unremarkable. CT CTA ordered. Likely discharge home. CT scans unremarkable. Patient given prescription for Antivert. Patient discharged home with primary care follow-up. Patient minimal discharge.       OUTSTANDING TASKS / RECOMMENDATIONS:    Reassessment  Follow-up CT scans  Dispo     FINAL IMPRESSION:     1.  Vertigo        DISPOSITION:         DISPOSITION:  [x]  Home   []  Nursing Facility   []  Transfer -    []  Admission -     FOLLOW-UP: [facility] [facility]  [place] [address]  [phone]  Schedule an appointment as soon as possible for a visit       [facility]  [address] [place] [address]  [phone]    If symptoms worsen   DISCHARGE MEDICATIONS: Discharge Medication List as of 10/15/2022  8:25 PM        START taking these medications    Details   meclizine (ANTIVERT) 12.5 MG tablet Take 1 tablet by mouth 3 times daily as needed for Dizziness, Disp-15 tablet, R-0Print                Roula Harvey DO  Emergency Medicine Resident  [address] [place], [place]  Resident  10/16/22 4981
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
## 2022-10-15 NOTE — ED NOTES
Pt to ER c/o chest pain, cough and has been having SOB. Pt states she has COPD, Diabetes and Hypertension.         Flores Mckinnon, RN  10/15/22 [address], RN  10/15/22 6604

## 2022-10-15 NOTE — ED NOTES
Pt sitting comfortably in bed, no complaints as of this time.   Vital signs within normal.      Markus Vora RN  10/15/22 3553

## 2022-10-15 NOTE — ED PROVIDER NOTES
[facility]     Emergency Department     Faculty Attestation        I performed a history and physical examination of the patient and discussed management with the resident. I reviewed the residents note and agree with the documented findings and plan of care. Any areas of disagreement are noted on the chart. I was personally present for the key portions of any procedures. I have documented in the chart those procedures where I was not present during the key portions. I have reviewed the emergency nurses triage note. I agree with the chief complaint, past medical history, past surgical history, allergies, medications, social and family history as documented unless otherwise noted below. For Physician Assistant/ Nurse Practitioner cases/documentation I have personally evaluated this patient and have completed at least one if not all key elements of the E/M (history, physical exam, and MDM). Additional findings are as noted. Vital Signs: BP: (!) 157/103  Heart Rate: 80  Resp: 17  Temp: 97 °F (36.1 °C) SpO2: 100 %  PCP:  Kali Awad MD    Pertinent Comments:           EKG Interpretation    Interpreted by emergency department physician    Rhythm: normal sinus   Rate: normal at 87 bpm  Axis: normal  Conduction: normal  ST Segments: no acute change  T Waves: no acute change  Q Waves: no acute change    Clinical Impression:  nonspecific EKG and appear essentially unchanged from previous EKG on 5/11/2020 compared      Critical Care  None      (Please note that portions of this note were completed with a voice recognition program. Efforts were made to edit the dictations but occasionally words are mis-transcribed.  Whenever words are used in this note in any gender, they shall be construed as though they were used in the gender appropriate to the circumstances; and whenever words are used in this note in the singular or plural form, they shall be construed as though they were used in the form appropriate to the circumstances.)    MD Berenice San  Attending Emergency Medicine Physician            Rosa M Bass MD  10/15/22 4150

## 2022-10-15 NOTE — ED NOTES
Writer met with patient at bedside regarding insurance questions. Patient states that she moved here in July from [place] where she had Medicaid in addition to her Medicare. She reports that she has recently applied for Medicaid in [place], is not approved yet, but also made the decision to move back to [place] in 3 weeks. Patient reports inability to afford her medications and has not had them since July. Writer assisted patient with understanding how to access low cost prescription help, she reports understanding and will be able to afford one of her medications this way. She reports concerns related to her insulin, which she has not taken since July. Writer informed physician to discuss plan of care regarding diabetes management.       YUDELKA Manriquez  10/15/22 1932

## 2022-10-18 LAB
EKG ATRIAL RATE: 87 BPM
EKG P AXIS: 45 DEGREES
EKG P-R INTERVAL: 142 MS
EKG Q-T INTERVAL: 380 MS
EKG QRS DURATION: 72 MS
EKG QTC CALCULATION (BAZETT): 457 MS
EKG R AXIS: 0 DEGREES
EKG T AXIS: 33 DEGREES
EKG VENTRICULAR RATE: 87 BPM

## 2023-02-24 NOTE — PROGRESS NOTES
Encounter Date:02/28/2023      Patient ID: Madina Clarke is a 48 y.o. female.    Chief Complaint   Patient presents with   • Consult   • Heart Murmur          History of Present Illness    48-year-old with a history of bipolar disorder, diabetes, hypertension, hyperlipidemia comes in as a new patient consult for uncontrolled blood pressure.  She said she has had longstanding hypertension and diabetes.  Her diabetes is poorly controlled.  Her blood pressure has been difficult to control.  Although in clinic today is 111/78 she says at home it is usually in the 150s.  She has multiple complaints including shortness of air with minimal exertion.  She gets episodes of tightening and pinching in her chest which occur at rest multiple times a day and resolve on their own.  There is no relation to activity.  No exacerbating or relieving factors.  She said she has chronic pain which occurs in her chest her head and her legs.  She has a history of drug abuse and is currently on Suboxone.  She smokes cigarettes and she also uses CBD oil and is on Suboxone.  Family history significant for CAD in her mom in her late 60s.        EKG in clinic today shows normal sinus rhythm with a rate of 69 bpm and no acute ST-T changes.    The following portions of the patient's history were reviewed and updated as appropriate: allergies, current medications, past family history, past medical history, past social history, past surgical history, and problem list.    Review of Systems   Constitutional: Positive for malaise/fatigue.   Cardiovascular: Positive for chest pain, leg swelling and palpitations. Negative for dyspnea on exertion.   Respiratory: Positive for shortness of breath. Negative for cough.    Gastrointestinal: Positive for nausea. Negative for abdominal pain and vomiting.   Neurological: Positive for dizziness, light-headedness and numbness. Negative for focal weakness and headaches.   All other systems reviewed and are  negative.        Current Outpatient Medications:   •  albuterol sulfate  (90 Base) MCG/ACT inhaler, ProAir HFA 90 mcg/actuation aerosol inhaler, Disp: , Rfl:   •  amLODIPine (NORVASC) 10 MG tablet, Take 1 tablet by mouth Daily., Disp: , Rfl:   •  atorvastatin (LIPITOR) 20 MG tablet, Take 1 tablet by mouth every night at bedtime., Disp: , Rfl:   •  buprenorphine-naloxone (SUBOXONE) 8-2 MG per SL tablet, DISSOLVE 2 SUBLINGUALLY (UNDER THE TONGUE) EVERY DAY, Disp: , Rfl:   •  DULoxetine (CYMBALTA) 30 MG capsule, Take 1 capsule by mouth Daily., Disp: , Rfl:   •  FLUoxetine (PROzac) 40 MG capsule, Take 1 capsule by mouth Daily., Disp: , Rfl:   •  gabapentin (NEURONTIN) 800 MG tablet, Take 1 tablet by mouth 2 (Two) Times a Day., Disp: , Rfl:   •  ketoconazole (NIZORAL) 2 % cream, APPLY TO CORNERS OF MOUTH UNTIL RASH IS CLEAR, Disp: , Rfl:   •  Lantus SoloStar 100 UNIT/ML injection pen, 30 UNITS SUBCUTANEOUS DAILY,X30 DAYS, Disp: , Rfl:   •  meloxicam (MOBIC) 15 MG tablet, Take 1 tablet by mouth Daily., Disp: , Rfl:   •  metFORMIN (GLUCOPHAGE) 500 MG tablet, Take 2 tablets by mouth 2 (Two) Times a Day., Disp: , Rfl:   •  QUEtiapine (SEROquel) 100 MG tablet, Take 1 tablet by mouth Every Morning., Disp: , Rfl:   •  QUEtiapine XR (SEROquel XR) 300 MG 24 hr tablet, Take 1 tablet by mouth every night at bedtime., Disp: , Rfl:   •  tiotropium (SPIRIVA) 18 MCG per inhalation capsule, Spiriva with HandiHaler 18 mcg and inhalation capsules, Disp: , Rfl:   •  lisinopril (PRINIVIL,ZESTRIL) 20 MG tablet, Take 1 tablet by mouth Daily., Disp: 90 tablet, Rfl: 3    Allergies   Allergen Reactions   • Meperidine Hives   • Penicillins Other (See Comments)     Unknown childhood    • Iodinated Contrast Media Itching       Family History   Problem Relation Age of Onset   • Hypertension Mother    • Heart disease Mother    • Hypertension Sister    • Heart disease Sister    • Hypertension Maternal Grandmother    • Heart disease Maternal  "Grandmother    • Hypertension Paternal Grandmother    • Heart disease Paternal Grandmother        Past Surgical History:   Procedure Laterality Date   • BREAST LUMPECTOMY     • CARPAL TUNNEL RELEASE     •  SECTION     •  SECTION     • SPINAL CORD STIMULATOR IMPLANT     • SPINAL CORD STIMULATOR REMOVAL     • TENNIS ELBOW RELEASE Bilateral        Past Medical History:   Diagnosis Date   • Bipolar disorder (HCC)    • DDD (degenerative disc disease), lumbar    • Diabetes mellitus (HCC)    • Fibromyalgia    • Hypertension        Social History     Socioeconomic History   • Marital status: Significant Other   Tobacco Use   • Smoking status: Every Day     Types: Cigarettes   • Smokeless tobacco: Never   Vaping Use   • Vaping Use: Every day   • Substances: CBD   • Devices: Disposable   Substance and Sexual Activity   • Alcohol use: Yes   • Drug use: Not Currently   • Sexual activity: Defer         Procedures      Objective:       Physical Exam    /78 (BP Location: Left arm, Patient Position: Sitting, Cuff Size: Large Adult)   Pulse 76   Ht 165.1 cm (65\")   Wt 95.3 kg (210 lb)   LMP  (LMP Unknown)   SpO2 96%   BMI 34.95 kg/m²   The patient is alert, oriented and in no distress.    Vital signs as noted above.    Head and neck revealed no carotid bruits or jugular venous distension.  No thyromegaly or lymphadenopathy is present.    Lungs clear.  No wheezing.  Breath sounds are normal bilaterally.    Heart normal first and second heart sounds.  No murmur..  No pericardial rub is present.  No gallop is present.    Abdomen soft and nontender.  No organomegaly is present.    Extremities revealed good peripheral pulses without any pedal edema.    Skin warm and dry.    Musculoskeletal system is grossly normal.    CNS grossly normal.           Diagnosis Plan   1. Essential hypertension        2. Mixed hyperlipidemia        3. Precordial pain        4. Dyspnea on exertion        LAB RESULTS (LAST 7 " DAYS)    CBC        BMP        CMP         BNP        TROPONIN        CoAg        Creatinine Clearance  CrCl cannot be calculated (No successful lab value found.).    ABG        Radiology  No radiology results for the last day         Assessment and Plan       Diagnoses and all orders for this visit:    1. Essential hypertension (Primary)    2. Mixed hyperlipidemia    3. Precordial pain    4. Dyspnea on exertion    Other orders  -     lisinopril (PRINIVIL,ZESTRIL) 20 MG tablet; Take 1 tablet by mouth Daily.  Dispense: 90 tablet; Refill: 3       Hypertension  Discontinue atenolol start lisinopril  Continue amlodipine 10 mg    Hyperlipidemia  Continue with atorvastatin    Chest pain  Her chest pain is atypical  She has chronic pain all over her body  This chest pain has also been chronic  No further work-up at this time    Dyspnea on exertion  Check echocardiogram        Marisa Rubin MD

## 2023-02-28 ENCOUNTER — OFFICE VISIT (OUTPATIENT)
Dept: CARDIOLOGY | Facility: CLINIC | Age: 48
End: 2023-02-28
Payer: MEDICARE

## 2023-02-28 VITALS
WEIGHT: 210 LBS | BODY MASS INDEX: 34.99 KG/M2 | HEIGHT: 65 IN | HEART RATE: 76 BPM | SYSTOLIC BLOOD PRESSURE: 111 MMHG | DIASTOLIC BLOOD PRESSURE: 78 MMHG | OXYGEN SATURATION: 96 %

## 2023-02-28 DIAGNOSIS — R06.09 DYSPNEA ON EXERTION: ICD-10-CM

## 2023-02-28 DIAGNOSIS — R07.2 PRECORDIAL PAIN: ICD-10-CM

## 2023-02-28 DIAGNOSIS — I10 ESSENTIAL HYPERTENSION: Primary | ICD-10-CM

## 2023-02-28 DIAGNOSIS — E78.2 MIXED HYPERLIPIDEMIA: ICD-10-CM

## 2023-02-28 PROCEDURE — 99205 OFFICE O/P NEW HI 60 MIN: CPT | Performed by: INTERNAL MEDICINE

## 2023-02-28 RX ORDER — ATENOLOL 25 MG/1
1 TABLET ORAL DAILY
COMMUNITY
Start: 2022-12-31 | End: 2023-02-28

## 2023-02-28 RX ORDER — ALBUTEROL SULFATE 90 UG/1
AEROSOL, METERED RESPIRATORY (INHALATION)
COMMUNITY

## 2023-02-28 RX ORDER — INSULIN GLARGINE 100 [IU]/ML
INJECTION, SOLUTION SUBCUTANEOUS
COMMUNITY
Start: 2023-01-31

## 2023-02-28 RX ORDER — GABAPENTIN 800 MG/1
1 TABLET ORAL 2 TIMES DAILY
COMMUNITY
Start: 2023-02-01

## 2023-02-28 RX ORDER — ATORVASTATIN CALCIUM 20 MG/1
1 TABLET, FILM COATED ORAL
COMMUNITY
Start: 2023-02-06

## 2023-02-28 RX ORDER — AMLODIPINE BESYLATE 10 MG/1
1 TABLET ORAL DAILY
COMMUNITY
Start: 2022-12-31

## 2023-02-28 RX ORDER — DULOXETIN HYDROCHLORIDE 30 MG/1
1 CAPSULE, DELAYED RELEASE ORAL DAILY
COMMUNITY

## 2023-02-28 RX ORDER — MELOXICAM 15 MG/1
1 TABLET ORAL DAILY
COMMUNITY
Start: 2023-02-26

## 2023-02-28 RX ORDER — QUETIAPINE FUMARATE 100 MG/1
100 TABLET, FILM COATED ORAL EVERY MORNING
COMMUNITY
Start: 2023-01-29

## 2023-02-28 RX ORDER — KETOCONAZOLE 20 MG/G
CREAM TOPICAL
COMMUNITY
Start: 2022-12-31

## 2023-02-28 RX ORDER — FLUOXETINE HYDROCHLORIDE 40 MG/1
1 CAPSULE ORAL DAILY
COMMUNITY
Start: 2022-12-31

## 2023-02-28 RX ORDER — QUETIAPINE 300 MG/1
1 TABLET, FILM COATED, EXTENDED RELEASE ORAL
COMMUNITY

## 2023-02-28 RX ORDER — LISINOPRIL 20 MG/1
20 TABLET ORAL DAILY
Qty: 90 TABLET | Refills: 3 | Status: SHIPPED | OUTPATIENT
Start: 2023-02-28

## 2023-02-28 RX ORDER — BUPRENORPHINE HYDROCHLORIDE AND NALOXONE HYDROCHLORIDE DIHYDRATE 8; 2 MG/1; MG/1
TABLET SUBLINGUAL
COMMUNITY
Start: 2023-01-31

## 2023-05-26 ENCOUNTER — OFFICE (OUTPATIENT)
Dept: URBAN - METROPOLITAN AREA CLINIC 64 | Facility: CLINIC | Age: 48
End: 2023-05-26

## 2023-05-26 VITALS
HEART RATE: 74 BPM | WEIGHT: 205 LBS | DIASTOLIC BLOOD PRESSURE: 91 MMHG | SYSTOLIC BLOOD PRESSURE: 127 MMHG | HEIGHT: 66 IN

## 2023-05-26 DIAGNOSIS — R10.30 LOWER ABDOMINAL PAIN, UNSPECIFIED: ICD-10-CM

## 2023-05-26 DIAGNOSIS — R19.4 CHANGE IN BOWEL HABIT: ICD-10-CM

## 2023-05-26 DIAGNOSIS — R11.2 NAUSEA WITH VOMITING, UNSPECIFIED: ICD-10-CM

## 2023-05-26 DIAGNOSIS — R19.7 DIARRHEA, UNSPECIFIED: ICD-10-CM

## 2023-05-26 PROCEDURE — 99203 OFFICE O/P NEW LOW 30 MIN: CPT | Performed by: NURSE PRACTITIONER

## 2023-05-30 ENCOUNTER — TRANSCRIBE ORDERS (OUTPATIENT)
Dept: ADMINISTRATIVE | Facility: HOSPITAL | Age: 48
End: 2023-05-30

## 2023-05-30 DIAGNOSIS — Z12.31 SCREENING MAMMOGRAM FOR BREAST CANCER: Primary | ICD-10-CM

## 2023-06-08 ENCOUNTER — HOSPITAL ENCOUNTER (OUTPATIENT)
Dept: MAMMOGRAPHY | Facility: HOSPITAL | Age: 48
Discharge: HOME | End: 2023-06-08
Admitting: FAMILY MEDICINE
Payer: MEDICARE

## 2023-06-08 DIAGNOSIS — Z12.31 SCREENING MAMMOGRAM FOR BREAST CANCER: ICD-10-CM

## 2023-06-08 PROCEDURE — 77063 BREAST TOMOSYNTHESIS BI: CPT

## 2023-06-08 PROCEDURE — 77067 SCR MAMMO BI INCL CAD: CPT

## 2023-08-24 ENCOUNTER — APPOINTMENT (OUTPATIENT)
Dept: GENERAL RADIOLOGY | Facility: HOSPITAL | Age: 48
End: 2023-08-24
Payer: MEDICARE

## 2023-08-24 ENCOUNTER — HOSPITAL ENCOUNTER (OUTPATIENT)
Facility: HOSPITAL | Age: 48
Setting detail: OBSERVATION
Discharge: HOME | End: 2023-08-25
Attending: EMERGENCY MEDICINE | Admitting: EMERGENCY MEDICINE
Payer: MEDICARE

## 2023-08-24 DIAGNOSIS — R07.9 CHEST PAIN, UNSPECIFIED TYPE: Primary | ICD-10-CM

## 2023-08-24 LAB
ALBUMIN SERPL-MCNC: 5.2 G/DL (ref 3.5–5.2)
ALBUMIN/GLOB SERPL: 1.5 G/DL
ALP SERPL-CCNC: 123 U/L (ref 39–117)
ALT SERPL W P-5'-P-CCNC: 16 U/L (ref 1–33)
ANION GAP SERPL CALCULATED.3IONS-SCNC: 16 MMOL/L (ref 5–15)
AST SERPL-CCNC: 15 U/L (ref 1–32)
BASOPHILS # BLD AUTO: 0.1 10*3/MM3 (ref 0–0.2)
BASOPHILS NFR BLD AUTO: 0.9 % (ref 0–1.5)
BILIRUB SERPL-MCNC: 0.7 MG/DL (ref 0–1.2)
BILIRUB UR QL STRIP: NEGATIVE
BUN SERPL-MCNC: 9 MG/DL (ref 6–20)
BUN/CREAT SERPL: 11.1 (ref 7–25)
CALCIUM SPEC-SCNC: 10.3 MG/DL (ref 8.6–10.5)
CHLORIDE SERPL-SCNC: 102 MMOL/L (ref 98–107)
CHOLEST SERPL-MCNC: 196 MG/DL (ref 0–200)
CLARITY UR: CLEAR
CO2 SERPL-SCNC: 21 MMOL/L (ref 22–29)
COLOR UR: YELLOW
CREAT SERPL-MCNC: 0.81 MG/DL (ref 0.57–1)
DEPRECATED RDW RBC AUTO: 42.9 FL (ref 37–54)
EGFRCR SERPLBLD CKD-EPI 2021: 90.8 ML/MIN/1.73
EOSINOPHIL # BLD AUTO: 0.3 10*3/MM3 (ref 0–0.4)
EOSINOPHIL NFR BLD AUTO: 2.4 % (ref 0.3–6.2)
ERYTHROCYTE [DISTWIDTH] IN BLOOD BY AUTOMATED COUNT: 13.3 % (ref 12.3–15.4)
GLOBULIN UR ELPH-MCNC: 3.5 GM/DL
GLUCOSE BLDC GLUCOMTR-MCNC: 148 MG/DL (ref 70–105)
GLUCOSE BLDC GLUCOMTR-MCNC: 153 MG/DL (ref 70–105)
GLUCOSE SERPL-MCNC: 145 MG/DL (ref 65–99)
GLUCOSE UR STRIP-MCNC: NEGATIVE MG/DL
HBA1C MFR BLD: 8 % (ref 4.8–5.6)
HCT VFR BLD AUTO: 48.8 % (ref 34–46.6)
HDLC SERPL-MCNC: 42 MG/DL (ref 40–60)
HGB BLD-MCNC: 16.6 G/DL (ref 12–15.9)
HGB UR QL STRIP.AUTO: NEGATIVE
KETONES UR QL STRIP: NEGATIVE
LDLC SERPL CALC-MCNC: 122 MG/DL (ref 0–100)
LDLC/HDLC SERPL: 2.81 {RATIO}
LEUKOCYTE ESTERASE UR QL STRIP.AUTO: NEGATIVE
LYMPHOCYTES # BLD AUTO: 3.3 10*3/MM3 (ref 0.7–3.1)
LYMPHOCYTES NFR BLD AUTO: 28.4 % (ref 19.6–45.3)
MCH RBC QN AUTO: 29.7 PG (ref 26.6–33)
MCHC RBC AUTO-ENTMCNC: 34.1 G/DL (ref 31.5–35.7)
MCV RBC AUTO: 87 FL (ref 79–97)
MONOCYTES # BLD AUTO: 0.5 10*3/MM3 (ref 0.1–0.9)
MONOCYTES NFR BLD AUTO: 4.8 % (ref 5–12)
NEUTROPHILS NFR BLD AUTO: 63.5 % (ref 42.7–76)
NEUTROPHILS NFR BLD AUTO: 7.3 10*3/MM3 (ref 1.7–7)
NITRITE UR QL STRIP: NEGATIVE
NRBC BLD AUTO-RTO: 0.2 /100 WBC (ref 0–0.2)
PH UR STRIP.AUTO: 6 [PH] (ref 5–8)
PLATELET # BLD AUTO: 301 10*3/MM3 (ref 140–450)
PMV BLD AUTO: 8.1 FL (ref 6–12)
POTASSIUM SERPL-SCNC: 3.9 MMOL/L (ref 3.5–5.2)
PROT SERPL-MCNC: 8.7 G/DL (ref 6–8.5)
PROT UR QL STRIP: NEGATIVE
RBC # BLD AUTO: 5.61 10*6/MM3 (ref 3.77–5.28)
SODIUM SERPL-SCNC: 139 MMOL/L (ref 136–145)
SP GR UR STRIP: 1.02 (ref 1–1.03)
TRIGL SERPL-MCNC: 180 MG/DL (ref 0–150)
TROPONIN T SERPL HS-MCNC: <6 NG/L
TROPONIN T SERPL HS-MCNC: <6 NG/L
UROBILINOGEN UR QL STRIP: NORMAL
VLDLC SERPL-MCNC: 32 MG/DL (ref 5–40)
WBC NRBC COR # BLD: 11.5 10*3/MM3 (ref 3.4–10.8)

## 2023-08-24 PROCEDURE — 99223 1ST HOSP IP/OBS HIGH 75: CPT | Performed by: INTERNAL MEDICINE

## 2023-08-24 PROCEDURE — 96376 TX/PRO/DX INJ SAME DRUG ADON: CPT

## 2023-08-24 PROCEDURE — 84484 ASSAY OF TROPONIN QUANT: CPT | Performed by: EMERGENCY MEDICINE

## 2023-08-24 PROCEDURE — 63710000001 ONDANSETRON PER 8 MG: Performed by: PHYSICIAN ASSISTANT

## 2023-08-24 PROCEDURE — 63710000001 INSULIN LISPRO (HUMAN) PER 5 UNITS: Performed by: PHYSICIAN ASSISTANT

## 2023-08-24 PROCEDURE — 71045 X-RAY EXAM CHEST 1 VIEW: CPT

## 2023-08-24 PROCEDURE — 93005 ELECTROCARDIOGRAM TRACING: CPT

## 2023-08-24 PROCEDURE — 85025 COMPLETE CBC W/AUTO DIFF WBC: CPT | Performed by: EMERGENCY MEDICINE

## 2023-08-24 PROCEDURE — 25010000002 ONDANSETRON PER 1 MG: Performed by: PHYSICIAN ASSISTANT

## 2023-08-24 PROCEDURE — 36415 COLL VENOUS BLD VENIPUNCTURE: CPT

## 2023-08-24 PROCEDURE — 99284 EMERGENCY DEPT VISIT MOD MDM: CPT

## 2023-08-24 PROCEDURE — 80053 COMPREHEN METABOLIC PANEL: CPT | Performed by: EMERGENCY MEDICINE

## 2023-08-24 PROCEDURE — G0378 HOSPITAL OBSERVATION PER HR: HCPCS

## 2023-08-24 PROCEDURE — 96374 THER/PROPH/DIAG INJ IV PUSH: CPT

## 2023-08-24 PROCEDURE — 81003 URINALYSIS AUTO W/O SCOPE: CPT | Performed by: PHYSICIAN ASSISTANT

## 2023-08-24 PROCEDURE — 83036 HEMOGLOBIN GLYCOSYLATED A1C: CPT | Performed by: INTERNAL MEDICINE

## 2023-08-24 PROCEDURE — 25010000002 MORPHINE PER 10 MG: Performed by: PHYSICIAN ASSISTANT

## 2023-08-24 PROCEDURE — 84484 ASSAY OF TROPONIN QUANT: CPT | Performed by: PHYSICIAN ASSISTANT

## 2023-08-24 PROCEDURE — 82948 REAGENT STRIP/BLOOD GLUCOSE: CPT

## 2023-08-24 PROCEDURE — 96375 TX/PRO/DX INJ NEW DRUG ADDON: CPT

## 2023-08-24 PROCEDURE — 80061 LIPID PANEL: CPT | Performed by: INTERNAL MEDICINE

## 2023-08-24 RX ORDER — ATOMOXETINE 40 MG/1
40 CAPSULE ORAL DAILY
COMMUNITY

## 2023-08-24 RX ORDER — ESCITALOPRAM OXALATE 10 MG/1
10 TABLET ORAL DAILY
Status: DISCONTINUED | OUTPATIENT
Start: 2023-08-24 | End: 2023-08-25 | Stop reason: HOSPADM

## 2023-08-24 RX ORDER — HYDROCODONE BITARTRATE AND ACETAMINOPHEN 7.5; 325 MG/1; MG/1
1 TABLET ORAL EVERY 6 HOURS PRN
Status: DISCONTINUED | OUTPATIENT
Start: 2023-08-24 | End: 2023-08-25 | Stop reason: HOSPADM

## 2023-08-24 RX ORDER — LAMOTRIGINE 25 MG/1
50 TABLET ORAL DAILY
COMMUNITY

## 2023-08-24 RX ORDER — NICOTINE 21 MG/24HR
1 PATCH, TRANSDERMAL 24 HOURS TRANSDERMAL
Status: DISCONTINUED | OUTPATIENT
Start: 2023-08-24 | End: 2023-08-25 | Stop reason: HOSPADM

## 2023-08-24 RX ORDER — SODIUM CHLORIDE 0.9 % (FLUSH) 0.9 %
10 SYRINGE (ML) INJECTION AS NEEDED
Status: DISCONTINUED | OUTPATIENT
Start: 2023-08-24 | End: 2023-08-25 | Stop reason: HOSPADM

## 2023-08-24 RX ORDER — ALBUTEROL SULFATE 2.5 MG/3ML
2.5 SOLUTION RESPIRATORY (INHALATION) EVERY 4 HOURS PRN
Status: DISCONTINUED | OUTPATIENT
Start: 2023-08-24 | End: 2023-08-25 | Stop reason: HOSPADM

## 2023-08-24 RX ORDER — BUPRENORPHINE HYDROCHLORIDE AND NALOXONE HYDROCHLORIDE DIHYDRATE 8; 2 MG/1; MG/1
1 TABLET SUBLINGUAL DAILY
Status: DISCONTINUED | OUTPATIENT
Start: 2023-08-24 | End: 2023-08-24

## 2023-08-24 RX ORDER — INSULIN LISPRO 100 [IU]/ML
2-9 INJECTION, SOLUTION INTRAVENOUS; SUBCUTANEOUS
Status: DISCONTINUED | OUTPATIENT
Start: 2023-08-24 | End: 2023-08-25 | Stop reason: HOSPADM

## 2023-08-24 RX ORDER — QUETIAPINE FUMARATE 100 MG/1
50 TABLET, FILM COATED ORAL NIGHTLY
Status: DISCONTINUED | OUTPATIENT
Start: 2023-08-24 | End: 2023-08-24

## 2023-08-24 RX ORDER — DULOXETIN HYDROCHLORIDE 30 MG/1
30 CAPSULE, DELAYED RELEASE ORAL DAILY
Status: DISCONTINUED | OUTPATIENT
Start: 2023-08-24 | End: 2023-08-24

## 2023-08-24 RX ORDER — NICOTINE POLACRILEX 4 MG
15 LOZENGE BUCCAL
Status: DISCONTINUED | OUTPATIENT
Start: 2023-08-24 | End: 2023-08-25 | Stop reason: HOSPADM

## 2023-08-24 RX ORDER — IBUPROFEN 600 MG/1
1 TABLET ORAL
Status: DISCONTINUED | OUTPATIENT
Start: 2023-08-24 | End: 2023-08-25 | Stop reason: HOSPADM

## 2023-08-24 RX ORDER — ATOMOXETINE 40 MG/1
40 CAPSULE ORAL DAILY
Status: DISCONTINUED | OUTPATIENT
Start: 2023-08-24 | End: 2023-08-25 | Stop reason: HOSPADM

## 2023-08-24 RX ORDER — NITROGLYCERIN 0.4 MG/1
0.4 TABLET SUBLINGUAL
Status: DISCONTINUED | OUTPATIENT
Start: 2023-08-24 | End: 2023-08-25 | Stop reason: HOSPADM

## 2023-08-24 RX ORDER — ATOMOXETINE 40 MG/1
40 CAPSULE ORAL DAILY
Status: DISCONTINUED | OUTPATIENT
Start: 2023-08-24 | End: 2023-08-24 | Stop reason: SDUPTHER

## 2023-08-24 RX ORDER — ATORVASTATIN CALCIUM 20 MG/1
20 TABLET, FILM COATED ORAL NIGHTLY
Status: DISCONTINUED | OUTPATIENT
Start: 2023-08-24 | End: 2023-08-25

## 2023-08-24 RX ORDER — ONDANSETRON 2 MG/ML
4 INJECTION INTRAMUSCULAR; INTRAVENOUS EVERY 6 HOURS PRN
Status: DISCONTINUED | OUTPATIENT
Start: 2023-08-24 | End: 2023-08-25 | Stop reason: HOSPADM

## 2023-08-24 RX ORDER — SODIUM CHLORIDE 9 MG/ML
40 INJECTION, SOLUTION INTRAVENOUS AS NEEDED
Status: DISCONTINUED | OUTPATIENT
Start: 2023-08-24 | End: 2023-08-25 | Stop reason: HOSPADM

## 2023-08-24 RX ORDER — SODIUM CHLORIDE 0.9 % (FLUSH) 0.9 %
10 SYRINGE (ML) INJECTION EVERY 12 HOURS SCHEDULED
Status: DISCONTINUED | OUTPATIENT
Start: 2023-08-24 | End: 2023-08-25 | Stop reason: HOSPADM

## 2023-08-24 RX ORDER — QUETIAPINE 300 MG/1
300 TABLET, FILM COATED, EXTENDED RELEASE ORAL NIGHTLY
Status: DISCONTINUED | OUTPATIENT
Start: 2023-08-24 | End: 2023-08-24

## 2023-08-24 RX ORDER — MEDROXYPROGESTERONE ACETATE 150 MG/ML
150 INJECTION, SUSPENSION INTRAMUSCULAR
COMMUNITY

## 2023-08-24 RX ORDER — DEXTROSE MONOHYDRATE 25 G/50ML
25 INJECTION, SOLUTION INTRAVENOUS
Status: DISCONTINUED | OUTPATIENT
Start: 2023-08-24 | End: 2023-08-25 | Stop reason: HOSPADM

## 2023-08-24 RX ORDER — GABAPENTIN 400 MG/1
800 CAPSULE ORAL EVERY 12 HOURS SCHEDULED
Status: DISCONTINUED | OUTPATIENT
Start: 2023-08-24 | End: 2023-08-25 | Stop reason: HOSPADM

## 2023-08-24 RX ORDER — KETAMINE HCL IN NACL, ISO-OSM 100MG/10ML
0.2 SYRINGE (ML) INJECTION ONCE
Status: DISCONTINUED | OUTPATIENT
Start: 2023-08-24 | End: 2023-08-24

## 2023-08-24 RX ORDER — ONDANSETRON 4 MG/1
4 TABLET, FILM COATED ORAL EVERY 6 HOURS PRN
Status: DISCONTINUED | OUTPATIENT
Start: 2023-08-24 | End: 2023-08-25 | Stop reason: HOSPADM

## 2023-08-24 RX ORDER — QUETIAPINE FUMARATE 100 MG/1
100 TABLET, FILM COATED ORAL NIGHTLY
Status: DISCONTINUED | OUTPATIENT
Start: 2023-08-24 | End: 2023-08-25 | Stop reason: HOSPADM

## 2023-08-24 RX ORDER — LAMOTRIGINE 25 MG/1
50 TABLET ORAL DAILY
Status: DISCONTINUED | OUTPATIENT
Start: 2023-08-24 | End: 2023-08-25 | Stop reason: HOSPADM

## 2023-08-24 RX ORDER — AMLODIPINE BESYLATE 5 MG/1
10 TABLET ORAL DAILY
Status: DISCONTINUED | OUTPATIENT
Start: 2023-08-24 | End: 2023-08-24

## 2023-08-24 RX ORDER — ASPIRIN 81 MG/1
324 TABLET, CHEWABLE ORAL ONCE
Status: COMPLETED | OUTPATIENT
Start: 2023-08-24 | End: 2023-08-24

## 2023-08-24 RX ORDER — MORPHINE SULFATE 2 MG/ML
2 INJECTION, SOLUTION INTRAMUSCULAR; INTRAVENOUS EVERY 4 HOURS PRN
Status: DISCONTINUED | OUTPATIENT
Start: 2023-08-24 | End: 2023-08-25 | Stop reason: HOSPADM

## 2023-08-24 RX ORDER — ESCITALOPRAM OXALATE 10 MG/1
10 TABLET ORAL DAILY
COMMUNITY

## 2023-08-24 RX ORDER — LISINOPRIL 20 MG/1
20 TABLET ORAL DAILY
Status: DISCONTINUED | OUTPATIENT
Start: 2023-08-24 | End: 2023-08-25 | Stop reason: HOSPADM

## 2023-08-24 RX ORDER — FLUOXETINE HYDROCHLORIDE 20 MG/1
40 CAPSULE ORAL DAILY
Status: DISCONTINUED | OUTPATIENT
Start: 2023-08-24 | End: 2023-08-24

## 2023-08-24 RX ORDER — QUETIAPINE FUMARATE 50 MG/1
50 TABLET, FILM COATED ORAL DAILY
COMMUNITY

## 2023-08-24 RX ADMIN — MORPHINE SULFATE 2 MG: 2 INJECTION, SOLUTION INTRAMUSCULAR; INTRAVENOUS at 22:21

## 2023-08-24 RX ADMIN — ONDANSETRON HYDROCHLORIDE 4 MG: 4 TABLET, FILM COATED ORAL at 20:46

## 2023-08-24 RX ADMIN — LISINOPRIL 20 MG: 20 TABLET ORAL at 16:41

## 2023-08-24 RX ADMIN — HYDROCODONE BITARTRATE AND ACETAMINOPHEN 1 TABLET: 7.5; 325 TABLET ORAL at 20:46

## 2023-08-24 RX ADMIN — GABAPENTIN 800 MG: 400 CAPSULE ORAL at 20:46

## 2023-08-24 RX ADMIN — QUETIAPINE FUMARATE 100 MG: 100 TABLET ORAL at 22:06

## 2023-08-24 RX ADMIN — MORPHINE SULFATE 2 MG: 2 INJECTION, SOLUTION INTRAMUSCULAR; INTRAVENOUS at 17:29

## 2023-08-24 RX ADMIN — NICOTINE 1 PATCH: 14 PATCH, EXTENDED RELEASE TRANSDERMAL at 17:32

## 2023-08-24 RX ADMIN — ONDANSETRON 4 MG: 2 INJECTION INTRAMUSCULAR; INTRAVENOUS at 17:28

## 2023-08-24 RX ADMIN — INSULIN LISPRO 2 UNITS: 100 INJECTION, SOLUTION INTRAVENOUS; SUBCUTANEOUS at 22:06

## 2023-08-24 RX ADMIN — NITROGLYCERIN 0.4 MG: 0.4 TABLET SUBLINGUAL at 16:45

## 2023-08-24 RX ADMIN — KETAMINE HYDROCHLORIDE 19 MG: 50 INJECTION INTRAMUSCULAR; INTRAVENOUS at 14:09

## 2023-08-24 RX ADMIN — LAMOTRIGINE 50 MG: 25 TABLET ORAL at 17:29

## 2023-08-24 RX ADMIN — ATORVASTATIN CALCIUM 20 MG: 20 TABLET, FILM COATED ORAL at 20:46

## 2023-08-24 RX ADMIN — ESCITALOPRAM OXALATE 10 MG: 10 TABLET ORAL at 17:29

## 2023-08-24 RX ADMIN — NITROGLYCERIN 0.4 MG: 0.4 TABLET SUBLINGUAL at 21:01

## 2023-08-24 RX ADMIN — Medication 10 ML: at 22:07

## 2023-08-24 RX ADMIN — ASPIRIN 81 MG CHEWABLE TABLET 324 MG: 81 TABLET CHEWABLE at 14:27

## 2023-08-24 NOTE — PLAN OF CARE
Goal Outcome Evaluation:   New admit patient A/O x4. RUQ ABD pains per patient at this time. Throbbing aching soreness. Need for cardiac clearance

## 2023-08-24 NOTE — ED PROVIDER NOTES
Subjective   History of Present Illness  48-year-old female presents from GI clinic where she was about to get a scope for chest pain.  Not currently having chest pain it was not this morning at clinic but endorsed it before they underwent the scope and so GI did not feel comfortable sedating her and sent her here.  Prefer that she gets cardiac clearance so they can scope her tomorrow.  States chest pain is sharp and crushing.  It comes and goes.  Nothing specific seems to bring it on.      Review of Systems  Positive for chest pain and abdominal pain.  Past Medical History:   Diagnosis Date    Bipolar disorder     DDD (degenerative disc disease), lumbar     Diabetes mellitus     Fibromyalgia     Hypertension        Allergies   Allergen Reactions    Meperidine Hives    Penicillins Other (See Comments)     Unknown childhood     Iodinated Contrast Media Itching       Past Surgical History:   Procedure Laterality Date    BREAST LUMPECTOMY Left     CARPAL TUNNEL RELEASE       SECTION       SECTION      SPINAL CORD STIMULATOR IMPLANT      SPINAL CORD STIMULATOR REMOVAL      TENNIS ELBOW RELEASE Bilateral        Family History   Problem Relation Age of Onset    Breast cancer Mother     Hypertension Mother     Heart disease Mother     Hypertension Sister     Heart disease Sister     Breast cancer Maternal Grandmother     Hypertension Maternal Grandmother     Heart disease Maternal Grandmother     Breast cancer Paternal Grandmother     Hypertension Paternal Grandmother     Heart disease Paternal Grandmother     Breast cancer Maternal Aunt     Breast cancer Paternal Aunt        Social History     Socioeconomic History    Marital status: Significant Other   Tobacco Use    Smoking status: Every Day     Packs/day: 0.25     Years: 35.00     Pack years: 8.75     Types: Cigarettes    Smokeless tobacco: Never   Vaping Use    Vaping Use: Former    Substances: CBD    Devices: Disposable   Substance and Sexual Activity  "   Alcohol use: Yes     Comment: 1 time per year    Drug use: Yes     Types: Marijuana    Sexual activity: Defer           Objective   Physical Exam  Constitutional:  No acute distress.  Head:  Atraumatic.  Normocephalic.   Eyes:  No scleral icterus. Normal conjunctivae  ENT:  Moist mucosa.  No nasal discharge present.  Cardiovascular:  Well perfused.  Equal pulses.  Regular rhythm and normal rate.  Normal capillary refill.    Pulmonary/Chest:  No respiratory distress.  Airway patent.  No tachypnea.  No accessory muscle usage.  Clear to auscultation bilaterally  Abdominal:  Nondistended. Nontender.   Extremities:  No peripheral edema.  No Deformity  Skin:  Warm, dry  Neurological:  Alert, awake, and appropriate.  Normal speech.      Procedures           ED Course      /79 (BP Location: Right arm, Patient Position: Lying)   Pulse 88   Temp 98.1 øF (36.7 øC) (Oral)   Resp 18   Ht 165.1 cm (65\")   Wt 94.8 kg (209 lb)   LMP 08/17/2023 (Approximate)   SpO2 98%   Breastfeeding No   BMI 34.78 kg/mý   Labs Reviewed   COMPREHENSIVE METABOLIC PANEL - Abnormal; Notable for the following components:       Result Value    Glucose 145 (*)     CO2 21.0 (*)     Total Protein 8.7 (*)     Alkaline Phosphatase 123 (*)     Anion Gap 16.0 (*)     All other components within normal limits    Narrative:     GFR Normal >60  Chronic Kidney Disease <60  Kidney Failure <15     CBC WITH AUTO DIFFERENTIAL - Abnormal; Notable for the following components:    WBC 11.50 (*)     RBC 5.61 (*)     Hemoglobin 16.6 (*)     Hematocrit 48.8 (*)     Monocyte % 4.8 (*)     Neutrophils, Absolute 7.30 (*)     Lymphocytes, Absolute 3.30 (*)     All other components within normal limits   POCT GLUCOSE FINGERSTICK - Abnormal; Notable for the following components:    Glucose 148 (*)     All other components within normal limits   SINGLE HSTROPONIN T - Normal    Narrative:     High Sensitive Troponin T Reference Range:  <10.0 ng/L- Negative Female " for AMI  <15.0 ng/L- Negative Male for AMI  >=10 - Abnormal Female indicating possible myocardial injury.  >=15 - Abnormal Male indicating possible myocardial injury.   Clinicians would have to utilize clinical acumen, EKG, Troponin, and serial changes to determine if it is an Acute Myocardial Infarction or myocardial injury due to an underlying chronic condition.        URINALYSIS W/ CULTURE IF INDICATED - Normal    Narrative:     In absence of clinical symptoms, the presence of pyuria, bacteria, and/or nitrites on the urinalysis result does not correlate with infection.  Urine microscopic not indicated.   TROPONIN   POCT GLUCOSE FINGERSTICK   POCT GLUCOSE FINGERSTICK   POCT GLUCOSE FINGERSTICK   POCT GLUCOSE FINGERSTICK   CBC AND DIFFERENTIAL    Narrative:     The following orders were created for panel order CBC & Differential.  Procedure                               Abnormality         Status                     ---------                               -----------         ------                     CBC Auto Differential[[id number]]        Abnormal            Final result                 Please view results for these tests on the individual orders.     Medications   sodium chloride 0.9 % flush 10 mL (has no administration in time range)   nitroglycerin (NITROSTAT) SL tablet 0.4 mg (has no administration in time range)   albuterol (PROVENTIL) nebulizer solution 0.083% 2.5 mg/3mL (has no administration in time range)   amLODIPine (NORVASC) tablet 10 mg (has no administration in time range)   atorvastatin (LIPITOR) tablet 20 mg (has no administration in time range)   buprenorphine-naloxone (SUBOXONE) 8-2 MG per SL tablet 1 tablet (has no administration in time range)   DULoxetine (CYMBALTA) DR capsule 30 mg (has no administration in time range)   FLUoxetine (PROzac) capsule 40 mg (has no administration in time range)   gabapentin (NEURONTIN) capsule 800 mg (has no administration in time range)   lisinopril  (PRINIVIL,ZESTRIL) tablet 20 mg (has no administration in time range)   QUEtiapine (SEROquel) tablet 100 mg (has no administration in time range)   QUEtiapine XR (SEROquel XR) 24 hr tablet 300 mg (has no administration in time range)   tiotropium (SPIRIVA RESPIMAT) 2.5 mcg/act aerosol solution inhaler (has no administration in time range)   insulin glargine (LANTUS, SEMGLEE) injection 30 Units (has no administration in time range)   sodium chloride 0.9 % flush 10 mL (has no administration in time range)   sodium chloride 0.9 % flush 10 mL (has no administration in time range)   sodium chloride 0.9 % infusion 40 mL (has no administration in time range)   dextrose (GLUTOSE) oral gel 15 g (has no administration in time range)   dextrose (D50W) (25 g/50 mL) IV injection 25 g (has no administration in time range)   glucagon (GLUCAGEN) injection 1 mg (has no administration in time range)   insulin lispro (HUMALOG/ADMELOG) injection 2-9 Units (has no administration in time range)   sodium chloride 0.9 % flush 10 mL (has no administration in time range)   sodium chloride 0.9 % flush 10 mL (has no administration in time range)   sodium chloride 0.9 % infusion 40 mL (has no administration in time range)   ketamine (KETALAR) 19 mg in sodium chloride 0.9 % 100 mL infusion (19 mg Intravenous New Bag 8/24/23 1409)   aspirin chewable tablet 324 mg (324 mg Oral Given 8/24/23 1427)     XR Chest 1 View    Result Date: 8/24/2023  Impression: No acute cardiopulmonary finding. Electronically Signed: Gracie Contreras MD  8/24/2023 12:52 PM EDT  Workstation ID: [id number]                                        Medical Decision Making  Problems Addressed:  Chest pain, unspecified type: complicated acute illness or injury    Amount and/or Complexity of Data Reviewed  Labs: ordered.  Radiology: ordered.    Risk  OTC drugs.  Prescription drug management.  Decision regarding hospitalization.      EKG # 1  Signed and interpreted by the EP.  Time  Interpreted: 11:49 AM  Rate: 83  Rhythm: Normal sinus rhythm  Axis: Normal axis  Intervals: Normal intervals  ST Segments: No acute ischemic changes     Comparison: No comparison EKG available    Patient began complaining of chest pain and abdominal pain and requesting opiates.  Ordered aspirin, nitro and ketamine.  Will place in observation for cardiology clearance so she can undergo her scope  Per GI request.    Final diagnoses:   Chest pain, unspecified type       ED Disposition  ED Disposition       ED Disposition   Decision to Admit    Condition   --    Comment   --               No follow-up provider specified.       Medication List        ASK your doctor about these medications      QUEtiapine 50 MG tablet  Commonly known as: SEROquel  Ask about: Which instructions should I use?                 Bryson Reed MD  08/24/23 [id number]

## 2023-08-24 NOTE — CONSULTS
Referring Provider: Bryson Reed MD    Reason for Consultation: Chest pain, preoperative cardiovascular risk assessment      Patient Care Team:  Danny Wood MD as PCP - General (Family Medicine)  Marisa Rubin MD as Consulting Physician (Interventional Cardiology)      SUBJECTIVE     Chief Complaint: Chest pain    History of present illness:  Madina Clarke is a 48 y.o. female with hypertension, hyperlipidemia, diabetes, smoking, obesity and significant family history of premature coronary disease who presents to the hospital with complaints of chest pain.  She was due to undergo endoscopy by GI however she complained of ongoing chest pain for the last several months.  GI did not feel comfortable sedating her and she was asked to come to the emergency room.  During my evaluation the patient complains of chest pain which is substernal and unrelated to rest or exertion.  She has significant anxiety related to premature coronary disease in several of her family members.  She is also a heavy smoker who is recently starting to cut back and is currently wearing a nicotine patch.    Review of systems:    Constitutional: No weakness, fatigue, fever, rigors, chills   Eyes: No vision changes, eye pain   ENT/oropharynx: No difficulty swallowing, sore throat, epistaxis, changes in hearing   Cardiovascular: + chest pain, chest tightness, palpitations, paroxysmal nocturnal dyspnea, orthopnea, diaphoresis, dizziness / syncopal episode   Respiratory: No shortness of breath, dyspnea on exertion, cough, wheezing, hemoptysis   Gastrointestinal: No abdominal pain, nausea, vomiting, diarrhea, bloody stools   Genitourinary: No hematuria, dysuria   Neurological: No headache, tremors, numbness, one-sided weakness    Musculoskeletal: No cramps, myalgias, joint pain, joint swelling   Integument: No rash, edema        Personal History:      Past Medical History:   Diagnosis Date    Bipolar disorder     DDD (degenerative disc  disease), lumbar     Diabetes mellitus     Fibromyalgia     Hypertension        Past Surgical History:   Procedure Laterality Date    BREAST LUMPECTOMY Left     CARPAL TUNNEL RELEASE       SECTION       SECTION      SPINAL CORD STIMULATOR IMPLANT      SPINAL CORD STIMULATOR REMOVAL      TENNIS ELBOW RELEASE Bilateral        Family History   Problem Relation Age of Onset    Breast cancer Mother     Hypertension Mother     Heart disease Mother     Hypertension Sister     Heart disease Sister     Breast cancer Maternal Grandmother     Hypertension Maternal Grandmother     Heart disease Maternal Grandmother     Breast cancer Paternal Grandmother     Hypertension Paternal Grandmother     Heart disease Paternal Grandmother     Breast cancer Maternal Aunt     Breast cancer Paternal Aunt        Social History     Tobacco Use    Smoking status: Every Day     Packs/day: 0.25     Years: 35.00     Pack years: 8.75     Types: Cigarettes    Smokeless tobacco: Never   Vaping Use    Vaping Use: Former    Substances: CBD    Devices: Disposable   Substance Use Topics    Alcohol use: Yes     Comment: 1 time per year    Drug use: Yes     Types: Marijuana        Medications Prior to Admission   Medication Sig Dispense Refill Last Dose    albuterol sulfate  (90 Base) MCG/ACT inhaler Inhale 1 puff Every 4 (Four) Hours As Needed.       atomoxetine (STRATTERA) 40 MG capsule Take 1 capsule by mouth Daily.       atorvastatin (LIPITOR) 20 MG tablet Take 1 tablet by mouth every night at bedtime.       escitalopram (LEXAPRO) 10 MG tablet Take 1 tablet by mouth Daily.       gabapentin (NEURONTIN) 800 MG tablet Take 1 tablet by mouth 2 (Two) Times a Day.       lamoTRIgine (LaMICtal) 25 MG tablet Take 2 tablets by mouth Daily.       lisinopril (PRINIVIL,ZESTRIL) 20 MG tablet Take 1 tablet by mouth Daily. 90 tablet 3     medroxyPROGESTERone (DEPO-PROVERA) 150 MG/ML injection Inject 1 mL into the appropriate muscle as directed  "by prescriber Every 3 (Three) Months.       QUEtiapine (SEROquel) 50 MG tablet Take 1 tablet by mouth Every Night.           Allergies:     Meperidine, Penicillins, and Iodinated contrast media    Scheduled Meds:atomoxetine, 40 mg, Oral, Daily  atorvastatin, 20 mg, Oral, Nightly  escitalopram, 10 mg, Oral, Daily  gabapentin, 800 mg, Oral, Q12H  insulin lispro, 2-9 Units, Subcutaneous, 4x Daily AC & at Bedtime  lamoTRIgine, 50 mg, Oral, Daily  lisinopril, 20 mg, Oral, Daily  nicotine, 1 patch, Transdermal, Q24H  QUEtiapine, 50 mg, Oral, Nightly  sodium chloride, 10 mL, Intravenous, Q12H  sodium chloride, 10 mL, Intravenous, Q12H      Continuous Infusions:   PRN Meds:  albuterol    dextrose    dextrose    glucagon (human recombinant)    HYDROcodone-acetaminophen    Morphine    nitroglycerin    ondansetron    ondansetron    [COMPLETED] Insert Peripheral IV **AND** sodium chloride    sodium chloride    sodium chloride    sodium chloride    sodium chloride      OBJECTIVE    Vital Signs  Vitals:    08/24/23 1545 08/24/23 1645 08/24/23 1656 08/24/23 1719   BP: 132/79 121/90 131/81 127/85   BP Location: Right arm  Right arm Right arm   Patient Position: Lying  Lying Lying   Pulse: 88 77 105 90   Resp: 18      Temp:       TempSrc:       SpO2: 98%  99%    Weight: 94.8 kg (209 lb)      Height: 165.1 cm (65\")          Flowsheet Rows      Flowsheet Row First Filed Value   Admission Height 165.1 cm (65\") Documented at 08/24/2023 1145   Admission Weight 95.3 kg (210 lb) Documented at 08/24/2023 1145            No intake or output data in the 24 hours ending 08/24/23 1847     Telemetry: Sinus rhythm with Q waves in the inferior leads    Physical Exam:  The patient is alert, oriented and in no distress.  Obese  Vital signs as noted above.  Head and neck revealed no carotid bruits or jugular venous distention.  No thyromegaly or lymphadenopathy is present  Lungs clear.  No wheezing.  Breath sounds are normal bilaterally.  Heart: " Normal first and second heart sounds. No murmur.  No precordial rub is present.  No gallop is present.  Abdomen: Soft and nontender.  No organomegaly is present.  Extremities with good peripheral pulses without any pedal edema.  Skin: Warm and dry.  Musculoskeletal system is grossly normal.  CNS grossly normal.       Results Review:  I have personally reviewed the results from the time of this admission to 8/24/2023 18:47 EDT and agree with these findings:  []  Laboratory  []  Microbiology  []  Radiology  []  EKG/Telemetry   []  Cardiology/Vascular   []  Pathology  []  Old records  []  Other:    Most notable findings include:     Lab Results (last 24 hours)       Procedure Component Value Units Date/Time    Lipid Panel [[id number]]  (Abnormal) Collected: 08/24/23 1652    Specimen: Blood from Arm, Left Updated: 08/24/23 1809     Total Cholesterol 196 mg/dL      Triglycerides 180 mg/dL      HDL Cholesterol 42 mg/dL      LDL Cholesterol  122 mg/dL      VLDL Cholesterol 32 mg/dL      LDL/HDL Ratio 2.81    Narrative:      Cholesterol Reference Ranges  (U.[place] Department of Health and Human Services ATP III Classifications)    Desirable          <200 mg/dL  Borderline High    200-239 mg/dL  High Risk          >240 mg/dL      Triglyceride Reference Ranges  (U.S. Department of Health and Human Services ATP III Classifications)    Normal           <150 mg/dL  Borderline High  150-199 mg/dL  High             200-499 mg/dL  Very High        >500 mg/dL    HDL Reference Ranges  (U.S. Department of Health and Human Services ATP III Classifications)    Low     <40 mg/dl (major risk factor for CHD)  High    >60 mg/dl ('negative' risk factor for CHD)        LDL Reference Ranges  (U.S. Department of Health and Human Services ATP III Classifications)    Optimal          <100 mg/dL  Near Optimal     100-129 mg/dL  Borderline High  130-159 mg/dL  High             160-189 mg/dL  Very High        >189 mg/dL    High Sensitivity Troponin T  [[id number]]  (Normal) Collected: 08/24/23 1652    Specimen: Blood from Arm, Left Updated: 08/24/23 1742     HS Troponin T <6 ng/L     Narrative:      High Sensitive Troponin T Reference Range:  <10.0 ng/L- Negative Female for AMI  <15.0 ng/L- Negative Male for AMI  >=10 - Abnormal Female indicating possible myocardial injury.  >=15 - Abnormal Male indicating possible myocardial injury.   Clinicians would have to utilize clinical acumen, EKG, Troponin, and serial changes to determine if it is an Acute Myocardial Infarction or myocardial injury due to an underlying chronic condition.         POC Glucose Once [[id number]]  (Abnormal) Collected: 08/24/23 1553    Specimen: Blood Updated: 08/24/23 1555     Glucose 148 mg/dL      Comment: Serial Number: [id number]:  [id number]       Urinalysis With Culture If Indicated - Urine, Clean Catch [[id number]]  (Normal) Collected: 08/24/23 1440    Specimen: Urine, Clean Catch Updated: 08/24/23 1450     Color, UA Yellow     Appearance, UA Clear     pH, UA 6.0     Specific Gravity, UA 1.017     Glucose, UA Negative     Ketones, UA Negative     Bilirubin, UA Negative     Blood, UA Negative     Protein, UA Negative     Leuk Esterase, UA Negative     Nitrite, UA Negative     Urobilinogen, UA 1.0 E.U./dL    Narrative:      In absence of clinical symptoms, the presence of pyuria, bacteria, and/or nitrites on the urinalysis result does not correlate with infection.  Urine microscopic not indicated.    Comprehensive Metabolic Panel [[id number]]  (Abnormal) Collected: 08/24/23 1314    Specimen: Blood Updated: 08/24/23 1339     Glucose 145 mg/dL      BUN 9 mg/dL      Creatinine 0.81 mg/dL      Sodium 139 mmol/L      Potassium 3.9 mmol/L      Chloride 102 mmol/L      CO2 21.0 mmol/L      Calcium 10.3 mg/dL      Total Protein 8.7 g/dL      Albumin 5.2 g/dL      ALT (SGPT) 16 U/L      AST (SGOT) 15 U/L      Alkaline Phosphatase 123 U/L      Total Bilirubin 0.7 mg/dL      Globulin 3.5 gm/dL       A/G Ratio 1.5 g/dL      BUN/Creatinine Ratio 11.1     Anion Gap 16.0 mmol/L      eGFR 90.8 mL/min/1.73     Narrative:      GFR Normal >60  Chronic Kidney Disease <60  Kidney Failure <15      Single High Sensitivity Troponin T [[id number]]  (Normal) Collected: 08/24/23 1314    Specimen: Blood Updated: 08/24/23 1339     HS Troponin T <6 ng/L     Narrative:      High Sensitive Troponin T Reference Range:  <10.0 ng/L- Negative Female for AMI  <15.0 ng/L- Negative Male for AMI  >=10 - Abnormal Female indicating possible myocardial injury.  >=15 - Abnormal Male indicating possible myocardial injury.   Clinicians would have to utilize clinical acumen, EKG, Troponin, and serial changes to determine if it is an Acute Myocardial Infarction or myocardial injury due to an underlying chronic condition.         CBC & Differential [[id number]]  (Abnormal) Collected: 08/24/23 1314    Specimen: Blood Updated: 08/24/23 1320    Narrative:      The following orders were created for panel order CBC & Differential.  Procedure                               Abnormality         Status                     ---------                               -----------         ------                     CBC Auto Differential[[id number]]        Abnormal            Final result                 Please view results for these tests on the individual orders.    CBC Auto Differential [[id number]]  (Abnormal) Collected: 08/24/23 1314    Specimen: Blood Updated: 08/24/23 1320     WBC 11.50 10*3/mm3      RBC 5.61 10*6/mm3      Hemoglobin 16.6 g/dL      Hematocrit 48.8 %      MCV 87.0 fL      MCH 29.7 pg      MCHC 34.1 g/dL      RDW 13.3 %      RDW-SD 42.9 fl      MPV 8.1 fL      Platelets 301 10*3/mm3      Neutrophil % 63.5 %      Lymphocyte % 28.4 %      Monocyte % 4.8 %      Eosinophil % 2.4 %      Basophil % 0.9 %      Neutrophils, Absolute 7.30 10*3/mm3      Lymphocytes, Absolute 3.30 10*3/mm3      Monocytes, Absolute 0.50 10*3/mm3      Eosinophils, Absolute  0.30 10*3/mm3      Basophils, Absolute 0.10 10*3/mm3      nRBC 0.2 /100 WBC             Imaging Results (Last 24 Hours)       Procedure Component Value Units Date/Time    XR Chest 1 View [[id number]] Collected: 08/24/23 1248     Updated: 08/24/23 1255    Narrative:      XR CHEST 1 VW    Date of Exam: 8/24/2023 12:42 PM EDT    Indication: chest pain    Comparison: None available.    Findings:  The lungs are clear. The heart size is normal. There is no pleural effusion, pneumothorax or acute osseous abnormality      Impression:      Impression:  No acute cardiopulmonary finding.      Electronically Signed: Gracie Contreras MD    8/24/2023 12:52 PM EDT    Workstation ID: [id number]            LAB RESULTS (LAST 7 DAYS)    CBC  Results from last 7 days   Lab Units 08/24/23  1314   WBC 10*3/mm3 11.50*   RBC 10*6/mm3 5.61*   HEMOGLOBIN g/dL 16.6*   HEMATOCRIT % 48.8*   MCV fL 87.0   PLATELETS 10*3/mm3 301       BMP  Results from last 7 days   Lab Units 08/24/23  1314   SODIUM mmol/L 139   POTASSIUM mmol/L 3.9   CHLORIDE mmol/L 102   CO2 mmol/L 21.0*   BUN mg/dL 9   CREATININE mg/dL 0.81   GLUCOSE mg/dL 145*       CMP   Results from last 7 days   Lab Units 08/24/23  1314   SODIUM mmol/L 139   POTASSIUM mmol/L 3.9   CHLORIDE mmol/L 102   CO2 mmol/L 21.0*   BUN mg/dL 9   CREATININE mg/dL 0.81   GLUCOSE mg/dL 145*   ALBUMIN g/dL 5.2   BILIRUBIN mg/dL 0.7   ALK PHOS U/L 123*   AST (SGOT) U/L 15   ALT (SGPT) U/L 16       BNP        TROPONIN  Results from last 7 days   Lab Units 08/24/23  1652   HSTROP T ng/L <6       CoAg        Creatinine Clearance  Estimated Creatinine Clearance: 98.8 mL/min (by C-G formula based on SCr of 0.81 mg/dL).    ABG          Radiology  XR Chest 1 View    Result Date: 8/24/2023  Impression: No acute cardiopulmonary finding. Electronically Signed: Gracie Contreras MD  8/24/2023 12:52 PM EDT  Workstation ID: [id number]       EKG  I personally viewed and interpreted the patient's EKG/Telemetry data:  ECG 12 Lead  Chest Pain   Preliminary Result   HEART RATE= 83  bpm   RR Interval= 724  ms   MS Interval= 146  ms   P Horizontal Axis= 10  deg   P Front Axis= 28  deg   QRSD Interval= 81  ms   QT Interval= 382  ms   QRS Axis= -11  deg   T Wave Axis= 18  deg   - ABNORMAL ECG -   Sinus rhythm   Inferior infarct, old   No previous ECG available for comparison   Electronically Signed By:    Date and Time of Study: 2023-08-24 11:49:46      SCANNED - TELEMETRY     Final Result            Echocardiogram:          Stress Test:        Cardiac Catheterization:  No results found for this or any previous visit.        Other:      ASSESSMENT & PLAN:    Principal Problem:    Chest pain    Chest pain  Chest discomfort appears atypical .  Troponin is negative X 2 and ECG does not show any active ischemia.  She does have significant family history of premature coronary disease along with multiple cardiovascular risk factors.  We will proceed with a nuclear stress test.   She is unable to exercise.  Beta-blocker will be held    Hypertension  Currently well controlled on lisinopril.    Hyperlipidemia  Currently on Lipitor 20 mg daily.  , HDL 42, triglyceride 180 and total cholesterol 196.  Goal LDL is less than 70.  Increase atorvastatin to 80 mg p.o. daily    Diabetes  Obtain an A1c.  She reports to be taking insulin at home.    Obesity  BMI is ~35  Diet, exercise, weight loss and lifestyle modification discussed with the patient.  We also discussed screening and treatment of sleep apnea.    Anxiety/depression/fibromyalgia  On escitalopram and quetiapine  She is also on lamotrigine and atomoxetine.    Malik Nava MD  08/24/23  18:47 EDT

## 2023-08-24 NOTE — ED NOTES
"Chest pain center of chest that has been off and on for months.  She identifies it as a \"pinching in her heart\".  During times of chest pain patient reports nausea, breaks out in cold sweat and dizziness.  She also reports ongoing GI issues with abdomen pain  in lower right quadrant for years and is under care of GI doctor.  Reports that she had bowel prep for upper and lower GI last night but at time of procedure the GI doctor did not feel comfortable performing procedure with her complaint of chest pain.  "

## 2023-08-25 ENCOUNTER — APPOINTMENT (OUTPATIENT)
Dept: NUCLEAR MEDICINE | Facility: HOSPITAL | Age: 48
End: 2023-08-25
Payer: MEDICARE

## 2023-08-25 VITALS
OXYGEN SATURATION: 97 % | HEIGHT: 65 IN | RESPIRATION RATE: 18 BRPM | TEMPERATURE: 97.4 F | BODY MASS INDEX: 35.37 KG/M2 | WEIGHT: 212.3 LBS | SYSTOLIC BLOOD PRESSURE: 111 MMHG | HEART RATE: 91 BPM | DIASTOLIC BLOOD PRESSURE: 73 MMHG

## 2023-08-25 LAB
ANION GAP SERPL CALCULATED.3IONS-SCNC: 13 MMOL/L (ref 5–15)
BH CV REST NUCLEAR ISOTOPE DOSE: 10.8 MCI
BH CV STRESS BP STAGE 1: NORMAL
BH CV STRESS BP STAGE 2: NORMAL
BH CV STRESS BP STAGE 3: NORMAL
BH CV STRESS COMMENTS STAGE 1: NORMAL
BH CV STRESS COMMENTS STAGE 2: NORMAL
BH CV STRESS DOSE REGADENOSON STAGE 1: 0.4
BH CV STRESS DURATION MIN STAGE 1: 0
BH CV STRESS DURATION MIN STAGE 2: 4
BH CV STRESS DURATION SEC STAGE 1: 10
BH CV STRESS DURATION SEC STAGE 2: 0
BH CV STRESS HR STAGE 1: 130
BH CV STRESS HR STAGE 2: 125
BH CV STRESS HR STAGE 3: 126
BH CV STRESS NUCLEAR ISOTOPE DOSE: 33 MCI
BH CV STRESS PROTOCOL 1: NORMAL
BH CV STRESS RECOVERY BP: NORMAL MMHG
BH CV STRESS RECOVERY HR: 106 BPM
BH CV STRESS STAGE 1: 1
BH CV STRESS STAGE 2: 2
BH CV STRESS STAGE 3: 3
BUN SERPL-MCNC: 11 MG/DL (ref 6–20)
BUN/CREAT SERPL: 13.6 (ref 7–25)
CALCIUM SPEC-SCNC: 9.1 MG/DL (ref 8.6–10.5)
CHLORIDE SERPL-SCNC: 104 MMOL/L (ref 98–107)
CO2 SERPL-SCNC: 21 MMOL/L (ref 22–29)
CREAT SERPL-MCNC: 0.81 MG/DL (ref 0.57–1)
DEPRECATED RDW RBC AUTO: 43.3 FL (ref 37–54)
EGFRCR SERPLBLD CKD-EPI 2021: 90.8 ML/MIN/1.73
EOSINOPHIL # BLD MANUAL: 0.29 10*3/MM3 (ref 0–0.4)
EOSINOPHIL NFR BLD MANUAL: 3 % (ref 0.3–6.2)
ERYTHROCYTE [DISTWIDTH] IN BLOOD BY AUTOMATED COUNT: 13.4 % (ref 12.3–15.4)
GLUCOSE BLDC GLUCOMTR-MCNC: 190 MG/DL (ref 70–105)
GLUCOSE BLDC GLUCOMTR-MCNC: 202 MG/DL (ref 70–105)
GLUCOSE SERPL-MCNC: 194 MG/DL (ref 65–99)
HCT VFR BLD AUTO: 42.9 % (ref 34–46.6)
HGB BLD-MCNC: 15 G/DL (ref 12–15.9)
LV EF NUC BP: 74 %
LYMPHOCYTES # BLD MANUAL: 3.3 10*3/MM3 (ref 0.7–3.1)
LYMPHOCYTES NFR BLD MANUAL: 7 % (ref 5–12)
MAXIMAL PREDICTED HEART RATE: 174 BPM
MCH RBC QN AUTO: 30.9 PG (ref 26.6–33)
MCHC RBC AUTO-ENTMCNC: 35 G/DL (ref 31.5–35.7)
MCV RBC AUTO: 88.4 FL (ref 79–97)
MONOCYTES # BLD: 0.68 10*3/MM3 (ref 0.1–0.9)
NEUTROPHILS # BLD AUTO: 5.43 10*3/MM3 (ref 1.7–7)
NEUTROPHILS NFR BLD MANUAL: 56 % (ref 42.7–76)
PERCENT MAX PREDICTED HR: 74.71 %
PLATELET # BLD AUTO: 276 10*3/MM3 (ref 140–450)
PMV BLD AUTO: 9.1 FL (ref 6–12)
POTASSIUM SERPL-SCNC: 4 MMOL/L (ref 3.5–5.2)
QT INTERVAL: 382 MS
RBC # BLD AUTO: 4.85 10*6/MM3 (ref 3.77–5.28)
RBC MORPH BLD: NORMAL
SCAN SLIDE: NORMAL
SMALL PLATELETS BLD QL SMEAR: ADEQUATE
SODIUM SERPL-SCNC: 138 MMOL/L (ref 136–145)
STRESS BASELINE BP: NORMAL MMHG
STRESS BASELINE HR: 96 BPM
STRESS PERCENT HR: 88 %
STRESS POST PEAK BP: NORMAL MMHG
STRESS POST PEAK HR: 130 BPM
STRESS TARGET HR: 148 BPM
VARIANT LYMPHS NFR BLD MANUAL: 34 % (ref 19.6–45.3)
WBC MORPH BLD: NORMAL
WBC NRBC COR # BLD: 9.7 10*3/MM3 (ref 3.4–10.8)

## 2023-08-25 PROCEDURE — 78452 HT MUSCLE IMAGE SPECT MULT: CPT | Performed by: INTERNAL MEDICINE

## 2023-08-25 PROCEDURE — 0 TECHNETIUM TETROFOSMIN KIT: Performed by: EMERGENCY MEDICINE

## 2023-08-25 PROCEDURE — 82948 REAGENT STRIP/BLOOD GLUCOSE: CPT

## 2023-08-25 PROCEDURE — 63710000001 INSULIN LISPRO (HUMAN) PER 5 UNITS: Performed by: PHYSICIAN ASSISTANT

## 2023-08-25 PROCEDURE — 25010000002 MORPHINE PER 10 MG: Performed by: PHYSICIAN ASSISTANT

## 2023-08-25 PROCEDURE — A9502 TC99M TETROFOSMIN: HCPCS | Performed by: EMERGENCY MEDICINE

## 2023-08-25 PROCEDURE — 93017 CV STRESS TEST TRACING ONLY: CPT

## 2023-08-25 PROCEDURE — G0378 HOSPITAL OBSERVATION PER HR: HCPCS

## 2023-08-25 PROCEDURE — 25010000002 ONDANSETRON PER 1 MG: Performed by: PHYSICIAN ASSISTANT

## 2023-08-25 PROCEDURE — 25010000002 REGADENOSON 0.4 MG/5ML SOLUTION: Performed by: EMERGENCY MEDICINE

## 2023-08-25 PROCEDURE — 85025 COMPLETE CBC W/AUTO DIFF WBC: CPT | Performed by: PHYSICIAN ASSISTANT

## 2023-08-25 PROCEDURE — G0108 DIAB MANAGE TRN  PER INDIV: HCPCS

## 2023-08-25 PROCEDURE — 96376 TX/PRO/DX INJ SAME DRUG ADON: CPT

## 2023-08-25 PROCEDURE — 80048 BASIC METABOLIC PNL TOTAL CA: CPT | Performed by: PHYSICIAN ASSISTANT

## 2023-08-25 PROCEDURE — 78452 HT MUSCLE IMAGE SPECT MULT: CPT

## 2023-08-25 PROCEDURE — 93018 CV STRESS TEST I&R ONLY: CPT | Performed by: INTERNAL MEDICINE

## 2023-08-25 PROCEDURE — 99233 SBSQ HOSP IP/OBS HIGH 50: CPT | Performed by: INTERNAL MEDICINE

## 2023-08-25 PROCEDURE — 85007 BL SMEAR W/DIFF WBC COUNT: CPT | Performed by: PHYSICIAN ASSISTANT

## 2023-08-25 RX ORDER — CARVEDILOL 6.25 MG/1
6.25 TABLET ORAL 2 TIMES DAILY WITH MEALS
Status: DISCONTINUED | OUTPATIENT
Start: 2023-08-25 | End: 2023-08-25 | Stop reason: HOSPADM

## 2023-08-25 RX ORDER — CARVEDILOL 6.25 MG/1
6.25 TABLET ORAL 2 TIMES DAILY WITH MEALS
Qty: 60 TABLET | Refills: 0 | Status: SHIPPED | OUTPATIENT
Start: 2023-08-25

## 2023-08-25 RX ORDER — BLOOD-GLUCOSE METER
1 EACH MISCELLANEOUS
Qty: 1 KIT | Refills: 0 | Status: SHIPPED | OUTPATIENT
Start: 2023-08-25

## 2023-08-25 RX ORDER — REGADENOSON 0.08 MG/ML
0.4 INJECTION, SOLUTION INTRAVENOUS
Status: COMPLETED | OUTPATIENT
Start: 2023-08-25 | End: 2023-08-25

## 2023-08-25 RX ORDER — LANCETS
1 EACH MISCELLANEOUS
Qty: 100 EACH | Refills: 2 | Status: SHIPPED | OUTPATIENT
Start: 2023-08-25

## 2023-08-25 RX ORDER — PANTOPRAZOLE SODIUM 40 MG/1
40 TABLET, DELAYED RELEASE ORAL DAILY
Qty: 30 TABLET | Refills: 0 | Status: SHIPPED | OUTPATIENT
Start: 2023-08-25

## 2023-08-25 RX ORDER — BLOOD SUGAR DIAGNOSTIC
1 STRIP MISCELLANEOUS
Qty: 100 EACH | Refills: 2 | Status: SHIPPED | OUTPATIENT
Start: 2023-08-25

## 2023-08-25 RX ORDER — ATORVASTATIN CALCIUM 80 MG/1
80 TABLET, FILM COATED ORAL NIGHTLY
Qty: 90 TABLET | Refills: 0 | Status: SHIPPED | OUTPATIENT
Start: 2023-08-25

## 2023-08-25 RX ORDER — ATORVASTATIN CALCIUM 40 MG/1
80 TABLET, FILM COATED ORAL NIGHTLY
Status: DISCONTINUED | OUTPATIENT
Start: 2023-08-25 | End: 2023-08-25 | Stop reason: HOSPADM

## 2023-08-25 RX ADMIN — NICOTINE 1 PATCH: 14 PATCH, EXTENDED RELEASE TRANSDERMAL at 09:16

## 2023-08-25 RX ADMIN — GABAPENTIN 800 MG: 400 CAPSULE ORAL at 09:15

## 2023-08-25 RX ADMIN — MORPHINE SULFATE 2 MG: 2 INJECTION, SOLUTION INTRAMUSCULAR; INTRAVENOUS at 14:43

## 2023-08-25 RX ADMIN — Medication 10 ML: at 09:15

## 2023-08-25 RX ADMIN — INSULIN LISPRO 4 UNITS: 100 INJECTION, SOLUTION INTRAVENOUS; SUBCUTANEOUS at 11:46

## 2023-08-25 RX ADMIN — MORPHINE SULFATE 2 MG: 2 INJECTION, SOLUTION INTRAMUSCULAR; INTRAVENOUS at 10:45

## 2023-08-25 RX ADMIN — Medication 10 ML: at 10:46

## 2023-08-25 RX ADMIN — MORPHINE SULFATE 2 MG: 2 INJECTION, SOLUTION INTRAMUSCULAR; INTRAVENOUS at 06:13

## 2023-08-25 RX ADMIN — TETROFOSMIN 1 DOSE: 1.38 INJECTION, POWDER, LYOPHILIZED, FOR SOLUTION INTRAVENOUS at 06:51

## 2023-08-25 RX ADMIN — LAMOTRIGINE 50 MG: 25 TABLET ORAL at 09:15

## 2023-08-25 RX ADMIN — CARVEDILOL 6.25 MG: 6.25 TABLET, FILM COATED ORAL at 10:45

## 2023-08-25 RX ADMIN — TETROFOSMIN 1 DOSE: 1.38 INJECTION, POWDER, LYOPHILIZED, FOR SOLUTION INTRAVENOUS at 07:57

## 2023-08-25 RX ADMIN — LISINOPRIL 20 MG: 20 TABLET ORAL at 09:15

## 2023-08-25 RX ADMIN — Medication 10 ML: at 09:18

## 2023-08-25 RX ADMIN — Medication 10 ML: at 14:43

## 2023-08-25 RX ADMIN — ESCITALOPRAM OXALATE 10 MG: 10 TABLET ORAL at 09:15

## 2023-08-25 RX ADMIN — INSULIN LISPRO 2 UNITS: 100 INJECTION, SOLUTION INTRAVENOUS; SUBCUTANEOUS at 09:15

## 2023-08-25 RX ADMIN — ONDANSETRON 4 MG: 2 INJECTION INTRAMUSCULAR; INTRAVENOUS at 09:23

## 2023-08-25 RX ADMIN — REGADENOSON 0.4 MG: 0.08 INJECTION, SOLUTION INTRAVENOUS at 07:56

## 2023-08-25 NOTE — PROGRESS NOTES
Referring Provider: Bryson Reed MD    Reason for follow-up: Chest pain     Patient Care Team:  Danny Wood MD as PCP - General (Family Medicine)  Marisa Rubin MD as Consulting Physician (Interventional Cardiology)      SUBJECTIVE    Laying comfortably in bed.  Reported chest pain overnight.  Took nitroglycerin with some relief.     ROS  Review of all systems negative except as indicated.    Since I have last seen, the patient has been without any chest discomfort, shortness of breath, palpitations, dizziness or syncope.  Denies having any headache, abdominal pain, nausea, vomiting, diarrhea, constipation, loss of weight or loss of appetite.  Denies having any excessive bruising, hematuria or blood in the stool.  ROS      Personal History:    Past Medical History:   Diagnosis Date    Bipolar disorder     DDD (degenerative disc disease), lumbar     Diabetes mellitus     Fibromyalgia     Hypertension        Past Surgical History:   Procedure Laterality Date    BREAST LUMPECTOMY Left     CARPAL TUNNEL RELEASE       SECTION       SECTION      SPINAL CORD STIMULATOR IMPLANT      SPINAL CORD STIMULATOR REMOVAL      TENNIS ELBOW RELEASE Bilateral        Family History   Problem Relation Age of Onset    Breast cancer Mother     Hypertension Mother     Heart disease Mother     Hypertension Sister     Heart disease Sister     Breast cancer Maternal Grandmother     Hypertension Maternal Grandmother     Heart disease Maternal Grandmother     Breast cancer Paternal Grandmother     Hypertension Paternal Grandmother     Heart disease Paternal Grandmother     Breast cancer Maternal Aunt     Breast cancer Paternal Aunt        Social History     Tobacco Use    Smoking status: Every Day     Packs/day: 0.25     Years: 35.00     Pack years: 8.75     Types: Cigarettes    Smokeless tobacco: Never   Vaping Use    Vaping Use: Former    Substances: CBD    Devices: Disposable   Substance Use Topics    Alcohol  use: Yes     Comment: 1 time per year    Drug use: Yes     Types: Marijuana        Medications Prior to Admission   Medication Sig Dispense Refill Last Dose    albuterol sulfate  (90 Base) MCG/ACT inhaler Inhale 1 puff Every 4 (Four) Hours As Needed.       atomoxetine (STRATTERA) 40 MG capsule Take 1 capsule by mouth Daily.       atorvastatin (LIPITOR) 20 MG tablet Take 1 tablet by mouth every night at bedtime.       escitalopram (LEXAPRO) 10 MG tablet Take 1 tablet by mouth Daily.       gabapentin (NEURONTIN) 800 MG tablet Take 1 tablet by mouth 2 (Two) Times a Day.       lamoTRIgine (LaMICtal) 25 MG tablet Take 2 tablets by mouth Daily.       lisinopril (PRINIVIL,ZESTRIL) 20 MG tablet Take 1 tablet by mouth Daily. 90 tablet 3     medroxyPROGESTERone (DEPO-PROVERA) 150 MG/ML injection Inject 1 mL into the appropriate muscle as directed by prescriber Every 3 (Three) Months.       QUEtiapine (SEROquel) 50 MG tablet Take 2 tablets by mouth Every Night.   8/23/2023 at 2100    QUEtiapine (SEROquel) 50 MG tablet Take 1 tablet by mouth Daily.   8/23/2023 at 0900       Allergies:  Meperidine, Penicillins, and Iodinated contrast media    Scheduled Meds:atomoxetine, 40 mg, Oral, Daily  atorvastatin, 20 mg, Oral, Nightly  escitalopram, 10 mg, Oral, Daily  gabapentin, 800 mg, Oral, Q12H  insulin lispro, 2-9 Units, Subcutaneous, 4x Daily AC & at Bedtime  lamoTRIgine, 50 mg, Oral, Daily  lisinopril, 20 mg, Oral, Daily  nicotine, 1 patch, Transdermal, Q24H  QUEtiapine, 100 mg, Oral, Nightly  sodium chloride, 10 mL, Intravenous, Q12H  sodium chloride, 10 mL, Intravenous, Q12H      Continuous Infusions:   PRN Meds:.  albuterol    dextrose    dextrose    glucagon (human recombinant)    HYDROcodone-acetaminophen    Morphine    nitroglycerin    ondansetron    ondansetron    [COMPLETED] Insert Peripheral IV **AND** sodium chloride    sodium chloride    sodium chloride    sodium chloride    sodium chloride      OBJECTIVE    Vital  "Signs  Vitals:    08/24/23 2211 08/25/23 0236 08/25/23 0405 08/25/23 0550   BP: 135/90 104/77 122/89 115/75   BP Location: Right arm Right arm  Right arm   Patient Position: Lying Lying  Lying   Pulse: 78 100 105 90   Resp: 19 18  16   Temp: 97.8 øF (36.6 øC) 98.1 øF (36.7 øC)  98.5 øF (36.9 øC)   TempSrc: Oral Oral  Oral   SpO2: 97% 95% 98% 98%   Weight:    96.3 kg (212 lb 4.9 oz)   Height:           Flowsheet Rows      Flowsheet Row First Filed Value   Admission Height 165.1 cm (65\") Documented at 08/24/2023 1145   Admission Weight 95.3 kg (210 lb) Documented at 08/24/2023 1145              Intake/Output Summary (Last 24 hours) at 8/25/2023 0705  Last data filed at 8/25/2023 0550  Gross per 24 hour   Intake 480 ml   Output --   Net 480 ml          Telemetry: Normal sinus rhythm    Physical Exam:  The patient is alert, oriented and in no distress.  Obese.  Head and neck revealed no carotid bruits or jugular venous distention.  No thyromegaly or lymphadenopathy is present  Lungs clear.  No wheezing.  Breath sounds are normal bilaterally.  Heart normal first and second heart sounds.  No murmur. No precordial rub is present.  No gallop is present.  Abdomen soft and nontender.  No organomegaly is present.  Extremities with good peripheral pulses without any pedal edema.  Skin warm and dry.  Musculoskeletal system is grossly normal.  CNS grossly normal.       Results Review:  I have personally reviewed the results from the time of this admission to 8/25/2023 07:05 EDT and agree with these findings:  []  Laboratory  []  Microbiology  []  Radiology  []  EKG/Telemetry   []  Cardiology/Vascular   []  Pathology  []  Old records  []  Other:    Most notable findings include:    Lab Results (last 24 hours)       Procedure Component Value Units Date/Time    Manual Differential [[id number]]  (Abnormal) Collected: 08/25/23 0405    Specimen: Blood from Arm, Left Updated: 08/25/23 0536     Neutrophil % 56.0 %      Lymphocyte % 34.0 % "      Monocyte % 7.0 %      Eosinophil % 3.0 %      Neutrophils Absolute 5.43 10*3/mm3      Lymphocytes Absolute 3.30 10*3/mm3      Monocytes Absolute 0.68 10*3/mm3      Eosinophils Absolute 0.29 10*3/mm3      RBC Morphology Normal     WBC Morphology Normal     Platelet Estimate Adequate    CBC & Differential [[id number]] Collected: 08/25/23 0405    Specimen: Blood from Arm, Left Updated: 08/25/23 0536    Narrative:      The following orders were created for panel order CBC & Differential.  Procedure                               Abnormality         Status                     ---------                               -----------         ------                     CBC Auto Differential[[id number]]        Normal              Final result               Scan Slide[[id number]]                                       Final result                 Please view results for these tests on the individual orders.    Scan Slide [[id number]] Collected: 08/25/23 0405    Specimen: Blood from Arm, Left Updated: 08/25/23 0536     Scan Slide --     Comment: See Manual Differential Results       CBC Auto Differential [[id number]]  (Normal) Collected: 08/25/23 0405    Specimen: Blood from Arm, Left Updated: 08/25/23 0536     WBC 9.70 10*3/mm3      RBC 4.85 10*6/mm3      Hemoglobin 15.0 g/dL      Hematocrit 42.9 %      MCV 88.4 fL      MCH 30.9 pg      MCHC 35.0 g/dL      RDW 13.4 %      RDW-SD 43.3 fl      MPV 9.1 fL      Platelets 276 10*3/mm3     Narrative:      The previously reported component NRBC is no longer being reported. Previous result was 0.6 /100 WBC (Reference Range: 0.0-0.2 /100 WBC) on 8/25/2023 at 0456 EDT.    Basic Metabolic Panel [[id number]]  (Abnormal) Collected: 08/25/23 0405    Specimen: Blood from Arm, Left Updated: 08/25/23 0512     Glucose 194 mg/dL      BUN 11 mg/dL      Creatinine 0.81 mg/dL      Sodium 138 mmol/L      Potassium 4.0 mmol/L      Comment: Slight hemolysis detected by analyzer. Results may be affected.         Chloride 104 mmol/L      CO2 21.0 mmol/L      Calcium 9.1 mg/dL      BUN/Creatinine Ratio 13.6     Anion Gap 13.0 mmol/L      eGFR 90.8 mL/min/1.73     Narrative:      GFR Normal >60  Chronic Kidney Disease <60  Kidney Failure <15      POC Glucose Once [[id number]]  (Abnormal) Collected: 08/24/23 2110    Specimen: Blood Updated: 08/24/23 2112     Glucose 153 mg/dL      Comment: Serial Number: [id number]:  [id number]       Hemoglobin A1c [[id number]]  (Abnormal) Collected: 08/24/23 1855    Specimen: Blood from Arm, Right Updated: 08/24/23 1943     Hemoglobin A1C 8.00 %     Lipid Panel [[id number]]  (Abnormal) Collected: 08/24/23 1652    Specimen: Blood from Arm, Left Updated: 08/24/23 1809     Total Cholesterol 196 mg/dL      Triglycerides 180 mg/dL      HDL Cholesterol 42 mg/dL      LDL Cholesterol  122 mg/dL      VLDL Cholesterol 32 mg/dL      LDL/HDL Ratio 2.81    Narrative:      Cholesterol Reference Ranges  (U.[place] Department of Health and Human Services ATP III Classifications)    Desirable          <200 mg/dL  Borderline High    200-239 mg/dL  High Risk          >240 mg/dL      Triglyceride Reference Ranges  (U.S. Department of Health and Human Services ATP III Classifications)    Normal           <150 mg/dL  Borderline High  150-199 mg/dL  High             200-499 mg/dL  Very High        >500 mg/dL    HDL Reference Ranges  (U.S. Department of Health and Human Services ATP III Classifications)    Low     <40 mg/dl (major risk factor for CHD)  High    >60 mg/dl ('negative' risk factor for CHD)        LDL Reference Ranges  (U.S. Department of Health and Human Services ATP III Classifications)    Optimal          <100 mg/dL  Near Optimal     100-129 mg/dL  Borderline High  130-159 mg/dL  High             160-189 mg/dL  Very High        >189 mg/dL    High Sensitivity Troponin T [[id number]]  (Normal) Collected: 08/24/23 1652    Specimen: Blood from Arm, Left Updated: 08/24/23 1742     HS Troponin T <6 ng/L      Narrative:      High Sensitive Troponin T Reference Range:  <10.0 ng/L- Negative Female for AMI  <15.0 ng/L- Negative Male for AMI  >=10 - Abnormal Female indicating possible myocardial injury.  >=15 - Abnormal Male indicating possible myocardial injury.   Clinicians would have to utilize clinical acumen, EKG, Troponin, and serial changes to determine if it is an Acute Myocardial Infarction or myocardial injury due to an underlying chronic condition.         POC Glucose Once [[id number]]  (Abnormal) Collected: 08/24/23 1553    Specimen: Blood Updated: 08/24/23 1555     Glucose 148 mg/dL      Comment: Serial Number: [id number]:  [id number]       Urinalysis With Culture If Indicated - Urine, Clean Catch [[id number]]  (Normal) Collected: 08/24/23 1440    Specimen: Urine, Clean Catch Updated: 08/24/23 1450     Color, UA Yellow     Appearance, UA Clear     pH, UA 6.0     Specific Gravity, UA 1.017     Glucose, UA Negative     Ketones, UA Negative     Bilirubin, UA Negative     Blood, UA Negative     Protein, UA Negative     Leuk Esterase, UA Negative     Nitrite, UA Negative     Urobilinogen, UA 1.0 E.U./dL    Narrative:      In absence of clinical symptoms, the presence of pyuria, bacteria, and/or nitrites on the urinalysis result does not correlate with infection.  Urine microscopic not indicated.    Comprehensive Metabolic Panel [[id number]]  (Abnormal) Collected: 08/24/23 1314    Specimen: Blood Updated: 08/24/23 1339     Glucose 145 mg/dL      BUN 9 mg/dL      Creatinine 0.81 mg/dL      Sodium 139 mmol/L      Potassium 3.9 mmol/L      Chloride 102 mmol/L      CO2 21.0 mmol/L      Calcium 10.3 mg/dL      Total Protein 8.7 g/dL      Albumin 5.2 g/dL      ALT (SGPT) 16 U/L      AST (SGOT) 15 U/L      Alkaline Phosphatase 123 U/L      Total Bilirubin 0.7 mg/dL      Globulin 3.5 gm/dL      A/G Ratio 1.5 g/dL      BUN/Creatinine Ratio 11.1     Anion Gap 16.0 mmol/L      eGFR 90.8 mL/min/1.73     Narrative:      GFR  Normal >60  Chronic Kidney Disease <60  Kidney Failure <15      Single High Sensitivity Troponin T [[id number]]  (Normal) Collected: 08/24/23 1314    Specimen: Blood Updated: 08/24/23 1339     HS Troponin T <6 ng/L     Narrative:      High Sensitive Troponin T Reference Range:  <10.0 ng/L- Negative Female for AMI  <15.0 ng/L- Negative Male for AMI  >=10 - Abnormal Female indicating possible myocardial injury.  >=15 - Abnormal Male indicating possible myocardial injury.   Clinicians would have to utilize clinical acumen, EKG, Troponin, and serial changes to determine if it is an Acute Myocardial Infarction or myocardial injury due to an underlying chronic condition.         CBC & Differential [[id number]]  (Abnormal) Collected: 08/24/23 1314    Specimen: Blood Updated: 08/24/23 1320    Narrative:      The following orders were created for panel order CBC & Differential.  Procedure                               Abnormality         Status                     ---------                               -----------         ------                     CBC Auto Differential[[id number]]        Abnormal            Final result                 Please view results for these tests on the individual orders.    CBC Auto Differential [[id number]]  (Abnormal) Collected: 08/24/23 1314    Specimen: Blood Updated: 08/24/23 1320     WBC 11.50 10*3/mm3      RBC 5.61 10*6/mm3      Hemoglobin 16.6 g/dL      Hematocrit 48.8 %      MCV 87.0 fL      MCH 29.7 pg      MCHC 34.1 g/dL      RDW 13.3 %      RDW-SD 42.9 fl      MPV 8.1 fL      Platelets 301 10*3/mm3      Neutrophil % 63.5 %      Lymphocyte % 28.4 %      Monocyte % 4.8 %      Eosinophil % 2.4 %      Basophil % 0.9 %      Neutrophils, Absolute 7.30 10*3/mm3      Lymphocytes, Absolute 3.30 10*3/mm3      Monocytes, Absolute 0.50 10*3/mm3      Eosinophils, Absolute 0.30 10*3/mm3      Basophils, Absolute 0.10 10*3/mm3      nRBC 0.2 /100 WBC             Imaging Results (Last 24 Hours)        Procedure Component Value Units Date/Time    XR Chest 1 View [[id number]] Collected: 08/24/23 1248     Updated: 08/24/23 1255    Narrative:      XR CHEST 1 VW    Date of Exam: 8/24/2023 12:42 PM EDT    Indication: chest pain    Comparison: None available.    Findings:  The lungs are clear. The heart size is normal. There is no pleural effusion, pneumothorax or acute osseous abnormality      Impression:      Impression:  No acute cardiopulmonary finding.      Electronically Signed: Gracie Contreras MD    8/24/2023 12:52 PM EDT    Workstation ID: [id number]            LAB RESULTS (LAST 7 DAYS)    CBC  Results from last 7 days   Lab Units 08/25/23  0405 08/24/23  1314   WBC 10*3/mm3 9.70 11.50*   RBC 10*6/mm3 4.85 5.61*   HEMOGLOBIN g/dL 15.0 16.6*   HEMATOCRIT % 42.9 48.8*   MCV fL 88.4 87.0   PLATELETS 10*3/mm3 276 301       BMP  Results from last 7 days   Lab Units 08/25/23  0405 08/24/23  1314   SODIUM mmol/L 138 139   POTASSIUM mmol/L 4.0 3.9   CHLORIDE mmol/L 104 102   CO2 mmol/L 21.0* 21.0*   BUN mg/dL 11 9   CREATININE mg/dL 0.81 0.81   GLUCOSE mg/dL 194* 145*       CMP   Results from last 7 days   Lab Units 08/25/23  0405 08/24/23  1314   SODIUM mmol/L 138 139   POTASSIUM mmol/L 4.0 3.9   CHLORIDE mmol/L 104 102   CO2 mmol/L 21.0* 21.0*   BUN mg/dL 11 9   CREATININE mg/dL 0.81 0.81   GLUCOSE mg/dL 194* 145*   ALBUMIN g/dL  --  5.2   BILIRUBIN mg/dL  --  0.7   ALK PHOS U/L  --  123*   AST (SGOT) U/L  --  15   ALT (SGPT) U/L  --  16       BNP        TROPONIN  Results from last 7 days   Lab Units 08/24/23  1652   HSTROP T ng/L <6       CoAg        Creatinine Clearance  Estimated Creatinine Clearance: 99.6 mL/min (by C-G formula based on SCr of 0.81 mg/dL).    ABG        Radiology  XR Chest 1 View    Result Date: 8/24/2023  Impression: No acute cardiopulmonary finding. Electronically Signed: Gracie Contreras MD  8/24/2023 12:52 PM EDT  Workstation ID: [id number]       EKG  I personally viewed and interpreted the patient's  EKG/Telemetry data:  ECG 12 Lead Chest Pain   Preliminary Result   HEART RATE= 83  bpm   RR Interval= 724  ms   RI Interval= 146  ms   P Horizontal Axis= 10  deg   P Front Axis= 28  deg   QRSD Interval= 81  ms   QT Interval= 382  ms   QRS Axis= -11  deg   T Wave Axis= 18  deg   - ABNORMAL ECG -   Sinus rhythm   Inferior infarct, old   No previous ECG available for comparison   Electronically Signed By:    Date and Time of Study: 2023-08-24 11:49:46      SCANNED - TELEMETRY     Final Result      SCANNED - TELEMETRY     Final Result            Echocardiogram:          Stress Test:         Cardiac Catheterization:  No results found for this or any previous visit.         Other:         ASSESSMENT & PLAN:    Principal Problem:    Chest pain    Chest pain  Chest discomfort appears atypical .  Troponin is negative X 2 and ECG does not show any active ischemia.  She does have significant family history of premature coronary disease along with multiple cardiovascular risk factors.  Stress test os negative for ischemia. Low risk  Restart beta blocker  Eval for non cardiac reasons for chest pain.  Proceed with EGD/Colonoscopy with no further  cardiac work-up.     Hypertension  Currently well controlled on lisinopril.  Adding Coreg 6.25 mg p.o. twice daily with meals.     Hyperlipidemia  Currently on Lipitor 20 mg daily.  , HDL 42, triglyceride 180 and total cholesterol 196.  Goal LDL is less than 70.  Increase atorvastatin to 80 mg p.o. daily     Diabetes  A1c is 8  Needs better diabetes control  She reports to be taking insulin at home.     Obesity  BMI is ~35  Diet, exercise, weight loss and lifestyle modification discussed with the patient.  We also discussed screening and treatment of sleep apnea.     Anxiety/depression/fibromyalgia  On escitalopram and quetiapine  She is also on lamotrigine and atomoxetine.      Malik Nava MD  08/25/23  07:05 EDT

## 2023-08-25 NOTE — PLAN OF CARE
Goal Outcome Evaluation:   Pt had myoview and was negative. Spoke with GI not scopes while here will do outpatient. Controlling pain and nausea effectively. Patient eating drinking well

## 2023-08-25 NOTE — DISCHARGE SUMMARY
[facility]    Danny Wood MD    CHIEF COMPLAINT:     Chest pain    HISTORY OF PRESENT ILLNESS:    HPI    48-year-old female presents from GI clinic where she was about to get a scope for chest pain. Not currently having chest pain it was not this morning at clinic but endorsed it before they underwent the scope and so GI did not feel comfortable sedating her and sent her here. Prefer that she gets cardiac clearance so they can scope her tomorrow. States chest pain is sharp and crushing. It comes and goes. Nothing specific seems to bring it on.      Observation 23  Pt concurs with er hpi. Pt c/o intermittent chest pain. EKG and trop are unremarkable and without changes. Stress test ordered. Cardiology consulted.     Past Medical History:   Diagnosis Date    Bipolar disorder     DDD (degenerative disc disease), lumbar     Diabetes mellitus     Fibromyalgia     Hypertension      Past Surgical History:   Procedure Laterality Date    BREAST LUMPECTOMY Left     CARPAL TUNNEL RELEASE       SECTION       SECTION      SPINAL CORD STIMULATOR IMPLANT      SPINAL CORD STIMULATOR REMOVAL      TENNIS ELBOW RELEASE Bilateral      Family History   Problem Relation Age of Onset    Breast cancer Mother     Hypertension Mother     Heart disease Mother     Hypertension Sister     Heart disease Sister     Breast cancer Maternal Grandmother     Hypertension Maternal Grandmother     Heart disease Maternal Grandmother     Breast cancer Paternal Grandmother     Hypertension Paternal Grandmother     Heart disease Paternal Grandmother     Breast cancer Maternal Aunt     Breast cancer Paternal Aunt      Social History     Tobacco Use    Smoking status: Every Day     Packs/day: 0.25     Years: 35.00     Pack years: 8.75     Types: Cigarettes    Smokeless tobacco: Never   Vaping Use    Vaping Use: Former    Substances: CBD    Devices: Disposable   Substance Use Topics    Alcohol use: Yes      Comment: 1 time per year    Drug use: Yes     Types: Marijuana     Medications Prior to Admission   Medication Sig Dispense Refill Last Dose    albuterol sulfate  (90 Base) MCG/ACT inhaler Inhale 1 puff Every 4 (Four) Hours As Needed.       atomoxetine (STRATTERA) 40 MG capsule Take 1 capsule by mouth Daily.       atorvastatin (LIPITOR) 20 MG tablet Take 1 tablet by mouth every night at bedtime.       escitalopram (LEXAPRO) 10 MG tablet Take 1 tablet by mouth Daily.       gabapentin (NEURONTIN) 800 MG tablet Take 1 tablet by mouth 2 (Two) Times a Day.       lamoTRIgine (LaMICtal) 25 MG tablet Take 2 tablets by mouth Daily.       lisinopril (PRINIVIL,ZESTRIL) 20 MG tablet Take 1 tablet by mouth Daily. 90 tablet 3     medroxyPROGESTERone (DEPO-PROVERA) 150 MG/ML injection Inject 1 mL into the appropriate muscle as directed by prescriber Every 3 (Three) Months.       QUEtiapine (SEROquel) 50 MG tablet Take 2 tablets by mouth Every Night.   8/23/2023 at 2100    QUEtiapine (SEROquel) 50 MG tablet Take 1 tablet by mouth Daily.   8/23/2023 at 0900     Allergies:  Meperidine, Penicillins, and Iodinated contrast media    Immunization History   Administered Date(s) Administered    COVID-19 (QualtrÃ©) 07/13/2021    Flu Vaccine Split Quad 10/28/2010, 10/22/2013    Pneumococcal Polysaccharide (PPSV23) 10/22/2013           REVIEW OF SYSTEMS:    ROS    Cardiovascular:  Positive for chest pain.    Vital Signs  Temp:  [97.4 øF (36.3 øC)-98.5 øF (36.9 øC)] 97.4 øF (36.3 øC)  Heart Rate:  [] 91  Resp:  [13-19] 18  BP: (104-143)/(73-91) 111/73          Physical Exam:  Physical Exam    HENT:      Mouth/Throat:      Mouth: Mucous membranes are moist.   Cardiovascular:      Rate and Rhythm: Normal rate and regular rhythm.      Pulses: Normal pulses.      Heart sounds: Normal heart sounds.   Pulmonary:      Effort: Pulmonary effort is normal.      Breath sounds: Normal breath sounds.   Abdominal:      Palpations: Abdomen is  soft.   Neurological:      General: No focal deficit present.      Mental Status: She is alert and oriented to person, place, and time.   Psychiatric:         Mood and Affect: Mood normal.         Behavior: Behavior normal.   Emotional Behavior:    wnl   Debilities:   none  Results Review:    I reviewed the patient's new clinical results.  Lab Results (most recent)       Procedure Component Value Units Date/Time    POC Glucose Once [[id number]]  (Abnormal) Collected: 08/25/23 1118    Specimen: Blood Updated: 08/25/23 1121     Glucose 202 mg/dL      Comment: Serial Number: [id number]:  [id number]       POC Glucose Once [[id number]]  (Abnormal) Collected: 08/25/23 0727    Specimen: Blood Updated: 08/25/23 0730     Glucose 190 mg/dL      Comment: Serial Number: [id number]:  [id number]       Manual Differential [[id number]]  (Abnormal) Collected: 08/25/23 0405    Specimen: Blood from Arm, Left Updated: 08/25/23 0536     Neutrophil % 56.0 %      Lymphocyte % 34.0 %      Monocyte % 7.0 %      Eosinophil % 3.0 %      Neutrophils Absolute 5.43 10*3/mm3      Lymphocytes Absolute 3.30 10*3/mm3      Monocytes Absolute 0.68 10*3/mm3      Eosinophils Absolute 0.29 10*3/mm3      RBC Morphology Normal     WBC Morphology Normal     Platelet Estimate Adequate    CBC & Differential [[id number]] Collected: 08/25/23 0405    Specimen: Blood from Arm, Left Updated: 08/25/23 0536    Narrative:      The following orders were created for panel order CBC & Differential.  Procedure                               Abnormality         Status                     ---------                               -----------         ------                     CBC Auto Differential[[id number]]        Normal              Final result               Scan Slide[[id number]]                                       Final result                 Please view results for these tests on the individual orders.    Scan Slide [[id number]] Collected: 08/25/23 0405     Specimen: Blood from Arm, Left Updated: 08/25/23 0536     Scan Slide --     Comment: See Manual Differential Results       CBC Auto Differential [[id number]]  (Normal) Collected: 08/25/23 0405    Specimen: Blood from Arm, Left Updated: 08/25/23 0536     WBC 9.70 10*3/mm3      RBC 4.85 10*6/mm3      Hemoglobin 15.0 g/dL      Hematocrit 42.9 %      MCV 88.4 fL      MCH 30.9 pg      MCHC 35.0 g/dL      RDW 13.4 %      RDW-SD 43.3 fl      MPV 9.1 fL      Platelets 276 10*3/mm3     Narrative:      The previously reported component NRBC is no longer being reported. Previous result was 0.6 /100 WBC (Reference Range: 0.0-0.2 /100 WBC) on 8/25/2023 at 0456 EDT.    Basic Metabolic Panel [[id number]]  (Abnormal) Collected: 08/25/23 0405    Specimen: Blood from Arm, Left Updated: 08/25/23 0512     Glucose 194 mg/dL      BUN 11 mg/dL      Creatinine 0.81 mg/dL      Sodium 138 mmol/L      Potassium 4.0 mmol/L      Comment: Slight hemolysis detected by analyzer. Results may be affected.        Chloride 104 mmol/L      CO2 21.0 mmol/L      Calcium 9.1 mg/dL      BUN/Creatinine Ratio 13.6     Anion Gap 13.0 mmol/L      eGFR 90.8 mL/min/1.73     Narrative:      GFR Normal >60  Chronic Kidney Disease <60  Kidney Failure <15      Hemoglobin A1c [[id number]]  (Abnormal) Collected: 08/24/23 1855    Specimen: Blood from Arm, Right Updated: 08/24/23 1943     Hemoglobin A1C 8.00 %     Lipid Panel [[id number]]  (Abnormal) Collected: 08/24/23 1652    Specimen: Blood from Arm, Left Updated: 08/24/23 1809     Total Cholesterol 196 mg/dL      Triglycerides 180 mg/dL      HDL Cholesterol 42 mg/dL      LDL Cholesterol  122 mg/dL      VLDL Cholesterol 32 mg/dL      LDL/HDL Ratio 2.81    Narrative:      Cholesterol Reference Ranges  (U.S. Department of Health and Human Services ATP III Classifications)    Desirable          <200 mg/dL  Borderline High    200-239 mg/dL  High Risk          >240 mg/dL      Triglyceride Reference Ranges  (U.S. Department of  Health and Human Services ATP III Classifications)    Normal           <150 mg/dL  Borderline High  150-199 mg/dL  High             200-499 mg/dL  Very High        >500 mg/dL    HDL Reference Ranges  (U.S. Department of Health and Human Services ATP III Classifications)    Low     <40 mg/dl (major risk factor for CHD)  High    >60 mg/dl ('negative' risk factor for CHD)        LDL Reference Ranges  (U.S. Department of Health and Human Services ATP III Classifications)    Optimal          <100 mg/dL  Near Optimal     100-129 mg/dL  Borderline High  130-159 mg/dL  High             160-189 mg/dL  Very High        >189 mg/dL    High Sensitivity Troponin T [[id number]]  (Normal) Collected: 08/24/23 1652    Specimen: Blood from Arm, Left Updated: 08/24/23 1742     HS Troponin T <6 ng/L     Narrative:      High Sensitive Troponin T Reference Range:  <10.0 ng/L- Negative Female for AMI  <15.0 ng/L- Negative Male for AMI  >=10 - Abnormal Female indicating possible myocardial injury.  >=15 - Abnormal Male indicating possible myocardial injury.   Clinicians would have to utilize clinical acumen, EKG, Troponin, and serial changes to determine if it is an Acute Myocardial Infarction or myocardial injury due to an underlying chronic condition.         Urinalysis With Culture If Indicated - Urine, Clean Catch [[id number]]  (Normal) Collected: 08/24/23 1440    Specimen: Urine, Clean Catch Updated: 08/24/23 1450     Color, UA Yellow     Appearance, UA Clear     pH, UA 6.0     Specific Gravity, UA 1.017     Glucose, UA Negative     Ketones, UA Negative     Bilirubin, UA Negative     Blood, UA Negative     Protein, UA Negative     Leuk Esterase, UA Negative     Nitrite, UA Negative     Urobilinogen, UA 1.0 E.U./dL    Narrative:      In absence of clinical symptoms, the presence of pyuria, bacteria, and/or nitrites on the urinalysis result does not correlate with infection.  Urine microscopic not indicated.    Comprehensive Metabolic  Panel [[id number]]  (Abnormal) Collected: 08/24/23 1314    Specimen: Blood Updated: 08/24/23 1339     Glucose 145 mg/dL      BUN 9 mg/dL      Creatinine 0.81 mg/dL      Sodium 139 mmol/L      Potassium 3.9 mmol/L      Chloride 102 mmol/L      CO2 21.0 mmol/L      Calcium 10.3 mg/dL      Total Protein 8.7 g/dL      Albumin 5.2 g/dL      ALT (SGPT) 16 U/L      AST (SGOT) 15 U/L      Alkaline Phosphatase 123 U/L      Total Bilirubin 0.7 mg/dL      Globulin 3.5 gm/dL      A/G Ratio 1.5 g/dL      BUN/Creatinine Ratio 11.1     Anion Gap 16.0 mmol/L      eGFR 90.8 mL/min/1.73     Narrative:      GFR Normal >60  Chronic Kidney Disease <60  Kidney Failure <15      Single High Sensitivity Troponin T [[id number]]  (Normal) Collected: 08/24/23 1314    Specimen: Blood Updated: 08/24/23 1339     HS Troponin T <6 ng/L     Narrative:      High Sensitive Troponin T Reference Range:  <10.0 ng/L- Negative Female for AMI  <15.0 ng/L- Negative Male for AMI  >=10 - Abnormal Female indicating possible myocardial injury.  >=15 - Abnormal Male indicating possible myocardial injury.   Clinicians would have to utilize clinical acumen, EKG, Troponin, and serial changes to determine if it is an Acute Myocardial Infarction or myocardial injury due to an underlying chronic condition.         CBC & Differential [[id number]]  (Abnormal) Collected: 08/24/23 1314    Specimen: Blood Updated: 08/24/23 1320    Narrative:      The following orders were created for panel order CBC & Differential.  Procedure                               Abnormality         Status                     ---------                               -----------         ------                     CBC Auto Differential[[id number]]        Abnormal            Final result                 Please view results for these tests on the individual orders.    CBC Auto Differential [[id number]]  (Abnormal) Collected: 08/24/23 1314    Specimen: Blood Updated: 08/24/23 1320     WBC 11.50 10*3/mm3       RBC 5.61 10*6/mm3      Hemoglobin 16.6 g/dL      Hematocrit 48.8 %      MCV 87.0 fL      MCH 29.7 pg      MCHC 34.1 g/dL      RDW 13.3 %      RDW-SD 42.9 fl      MPV 8.1 fL      Platelets 301 10*3/mm3      Neutrophil % 63.5 %      Lymphocyte % 28.4 %      Monocyte % 4.8 %      Eosinophil % 2.4 %      Basophil % 0.9 %      Neutrophils, Absolute 7.30 10*3/mm3      Lymphocytes, Absolute 3.30 10*3/mm3      Monocytes, Absolute 0.50 10*3/mm3      Eosinophils, Absolute 0.30 10*3/mm3      Basophils, Absolute 0.10 10*3/mm3      nRBC 0.2 /100 WBC             Imaging Results (Most Recent)       Procedure Component Value Units Date/Time    XR Chest 1 View [[id number]] Collected: 08/24/23 1248     Updated: 08/24/23 1255    Narrative:      XR CHEST 1 VW    Date of Exam: 8/24/2023 12:42 PM EDT    Indication: chest pain    Comparison: None available.    Findings:  The lungs are clear. The heart size is normal. There is no pleural effusion, pneumothorax or acute osseous abnormality      Impression:      Impression:  No acute cardiopulmonary finding.      Electronically Signed: Gracie Contreras MD    8/24/2023 12:52 PM EDT    Workstation ID: [id number]          reviewed    ECG/EMG Results (most recent)       Procedure Component Value Units Date/Time    SCANNED - TELEMETRY   [[id number]] Resulted: 08/24/23     Updated: 08/24/23 1624    SCANNED - TELEMETRY   [[id number]] Resulted: 08/24/23     Updated: 08/25/23 0558    SCANNED - TELEMETRY   [[id number]] Resulted: 08/24/23     Updated: 08/25/23 0723    ECG 12 Lead Chest Pain [[id number]] Collected: 08/24/23 1149     Updated: 08/25/23 0737     QT Interval 382 ms     Narrative:      HEART RATE= 83  bpm  RR Interval= 724  ms  PA Interval= 146  ms  P Horizontal Axis= 10  deg  P Front Axis= 28  deg  QRSD Interval= 81  ms  QT Interval= 382  ms  QRS Axis= -11  deg  T Wave Axis= 18  deg  - ABNORMAL ECG -  Sinus rhythm  Inferior infarct, old  No previous ECG available for comparison  Electronically  Signed By: Bryson Reed ([facility]) 25-Aug-2023 07:37:40  Date and Time of Study: 2023-08-24 11:49:46    SCANNED - TELEMETRY   [[id number]] Resulted: 08/24/23     Updated: 08/25/23 0843    SCANNED - TELEMETRY   [[id number]] Resulted: 08/24/23     Updated: 08/25/23 0843    SCANNED - TELEMETRY   [[id number]] Resulted: 08/24/23     Updated: 08/25/23 1129          reviewed            Microbiology Results (last 10 days)       ** No results found for the last 240 hours. **            Assessment & Plan     Chest pain    Essential hypertension    Gastroesophageal reflux disease    Hyperlipidemia    DM (diabetes mellitus), type 2    Tobacco abuse       Chest pain        Lab Results   Component Value Date     TROPONINT <6 08/24/2023     TROPONINT <6 08/24/2023      -cbc,bmp are unremarkable  -Lipid panel , Trig 180, HDL 42   -Chest X-ray:reviewed and showing no cute cardiopulmonary processes  -EKG: rate 83, sinus no st elevation  -In the ED pt given asa  -Stress Test performed and is low risk for ischemia  - cardiology consulted for clearance for GI procedure  -Telemetry  -NPO     I discussed the patients findings and my recommendations with patient and nursing staff.     Discharge Diagnosis:      Chest pain    Essential hypertension    Gastroesophageal reflux disease    Hyperlipidemia    DM (diabetes mellitus), type 2    Tobacco abuse      Hospital Course  Patient is a 48 y.o. female presented with chest pain directly before an outpt scope by GI. Sent from clinic for cardiac clearance.ED evaluated pt and admitted to observation. Pt cbc and cmp were unremarkable. EKG without st elevation, lipids mildly elevated, chest xray without acute disease process. stress test performed and low risk for ischemia. Cardiology has cleared for gi scope. Pt to be discharged and have scope as outpt by GI. Discussed discharge plans with pt and she is agreeable to discharge. Instructed pt to return to er if symptoms reoccur.     Past  Medical History:     Past Medical History:   Diagnosis Date    Bipolar disorder     DDD (degenerative disc disease), lumbar     Diabetes mellitus     Fibromyalgia     Hypertension        Past Surgical History:     Past Surgical History:   Procedure Laterality Date    BREAST LUMPECTOMY Left     CARPAL TUNNEL RELEASE       SECTION       SECTION      SPINAL CORD STIMULATOR IMPLANT      SPINAL CORD STIMULATOR REMOVAL      TENNIS ELBOW RELEASE Bilateral        Social History:   Social History     Socioeconomic History    Marital status: Significant Other   Tobacco Use    Smoking status: Every Day     Packs/day: 0.25     Years: 35.00     Pack years: 8.75     Types: Cigarettes    Smokeless tobacco: Never   Vaping Use    Vaping Use: Former    Substances: CBD    Devices: Disposable   Substance and Sexual Activity    Alcohol use: Yes     Comment: 1 time per year    Drug use: Yes     Types: Marijuana    Sexual activity: Defer       Procedures Performed         Consults:   Consults       Date and Time Order Name Status Description    2023  2:26 PM Inpatient Cardiology Consult Completed             Condition on Discharge:     Stable    Discharge Disposition      Discharge Medications     Discharge Medications        ASK your doctor about these medications        Instructions Start Date   albuterol sulfate  (90 Base) MCG/ACT inhaler  Commonly known as: PROVENTIL HFA;VENTOLIN HFA;PROAIR HFA   Inhale 1 puff Every 4 (Four) Hours As Needed.      atomoxetine 40 MG capsule  Commonly known as: STRATTERA   40 mg, Oral, Daily      atorvastatin 20 MG tablet  Commonly known as: LIPITOR   1 tablet, Oral, Every Night at Bedtime      escitalopram 10 MG tablet  Commonly known as: LEXAPRO   10 mg, Oral, Daily      gabapentin 800 MG tablet  Commonly known as: NEURONTIN   1 tablet, Oral, 2 Times Daily      lamoTRIgine 25 MG tablet  Commonly known as: LaMICtal   50 mg, Oral, Daily      lisinopril 20 MG tablet  Commonly  known as: PRINIVIL,ZESTRIL   20 mg, Oral, Daily      medroxyPROGESTERone 150 MG/ML injection  Commonly known as: DEPO-PROVERA   150 mg, Intramuscular, Every 3 Months      QUEtiapine 50 MG tablet  Commonly known as: SEROquel  Ask about: Which instructions should I use?   50 mg, Oral, Daily      QUEtiapine 50 MG tablet  Commonly known as: SEROquel  Ask about: Which instructions should I use?   100 mg, Oral, Nightly               Discharge Diet:     Activity at Discharge:     Follow-up Appointments  Future Appointments   Date Time Provider Department Center   8/29/2023  2:00 PM Malik Nava MD [facility]         Test Results Pending at Discharge       Risk for Readmission (LACE) Score: 1 (8/25/2023  6:00 AM)      Less Than 30 minutes spent in discharge activities for this patient    Bety Best PA-C  08/25/23  12:30 EDT

## 2023-08-25 NOTE — CONSULTS
"Diabetes Education  Assessment/Teaching    Patient Name:  Madina Clarke  YOB: 1975  MRN: [id number]  Admit Date:  8/24/2023      Assessment Date:  8/25/2023  Flowsheet Row Most Recent Value   General Information     Referral From: MD Clarisa order  [MD consult for A1c >7.0% and on 8/24/2023 A1c was 8.0%.]   Height 165.1 cm (65\")   Height Method Stated   Weight 96.3 kg (212 lb 4.9 oz)   Weight Method Standing scale   Pregnancy Assessment    Diabetes History    What type of diabetes do you have? Type 2   Length of Diabetes Diagnosis 1 - 5 years  [Patient stated that she was diagnosed about 5 years ago.]   Current DM knowledge fair   Have you had diabetes education/teaching in the past? no   Do you test your blood sugar at home? no   Have you had high blood sugar? (>140mg/dl) yes   How often do you have high blood sugar? unknown   When was your last high blood sugar? Admission blood sugar 145   Education Preferences    What areas of diabetes would you like to learn about? avoiding high blood sugar, diabetes complications, diet information, understanding diabetes, testing my blood sugar at home   Nutrition Information    Assessment Topics    Healthy Eating - Assessment Needs education   Problem Solving - Assessment Needs education   Reducing Risk - Assessment Needs education   Monitoring - Assessment Needs education   DM Goals    Healthy Eating - Goal Today   Problem Solving - Goal Today   Reducing Risk - Goal Today   Monitoring - Goal Today            Flowsheet Row Most Recent Value   DM Education Needs    Meter Needs meter   Meter Type Accuchek   Frequency of Testing AC meals  [Log book given to patient with discussion on checking blood sugar 3X a day before meals and sometimes at bedtime.]   Medication Insulin  [At home patient stated that she takes Lantus 36 units every morning.]   Problem Solving Hyperglycemia, Signs, Treatment, Symptoms   Reducing Risks A1C testing, Lipids, Blood pressure, Eye exam, " Dental exam, Foot care, Immunizations, Cardiovascular, Neuropathy, Retinopathy  [On 8/24/2023 A1c was 8.0%.]   Healthy Eating Basic meal plan provided   Discharge Plan Home   Motivation Engaged, Strong   Teaching Method Explanation, Handouts, Discussion   Patient Response Verbalized understanding              Other Comments:  A1c info sheet given with discussion on A1c target and healthy blood sugar range. Patient stated she had an Accu-chek glucometer but lost it in the move from [place], [place], 2 months ago. Patient stated she was without insurance for a while and was not taking insulin. Handouts given on ReliOn insulin and glucometer with prices. Explained to patient that she could get ReliOn insulin without a prescription. First Steps book to managing diabetes given to patient with discussion on diabetes and diagnosis. Discussed how diabetes puts you at higher risk for heart attack, stroke, kidney failure, blindness, and neuropathy.  Also discussed the importance of yearly eye exams, dental exams, regular follow-up with PCP to check blood pressure, lipids, kidney function, liver function, and to keep up-to-date on vaccinations. Foot care discussed with checking feet daily and wearing shoes when out of bed. Handouts given with discussion on 1 serving of carbs being = 15 grams, being allowed 3 servings  of carbs per meal, counting carbs, reading food labels, and meal planning. Patient stated she has recently started changing her diet and went from 223 pounds to 209 pounds. Discussed importance of exercise in blood sugar control and the recommendation of 150 minutes of exercise a week minimally alternating days.  Prescriptions started in discharge orders for lancets, test strips, and Accu-chek Guide Me glucometer. Patient has no further questions or concerns related to diabetes at this time.        Electronically signed by:  Anisa Koch RN  08/25/23 17:51 EDT

## 2023-08-25 NOTE — PLAN OF CARE
Problem: Chest Pain  Goal: Resolution of Chest Pain Symptoms  8/24/2023 2349 by Cheryl Barrientos RN  Outcome: Ongoing, Not Progressing  8/24/2023 2348 by Cheryl Barrientos RN  Outcome: Ongoing, Not Progressing     Problem: Pain Acute  Goal: Acceptable Pain Control and Functional Ability  8/24/2023 2349 by Cheryl Barrientos, MIKAYLA  Outcome: Ongoing, Not Progressing  8/24/2023 2348 by Cheryl Barrientos RN  Outcome: Ongoing, Not Progressing  Intervention: Prevent or Manage Pain  Recent Flowsheet Documentation  Taken 8/24/2023 2000 by Cheryl Barrientos RN  Sensory Stimulation Regulation: care clustered  Sleep/Rest Enhancement: awakenings minimized  Medication Review/Management: medications reviewed  Intervention: Develop Pain Management Plan  Recent Flowsheet Documentation  Taken 8/24/2023 2221 by Cheryl Barrientos RN  Pain Management Interventions: see MAR  Taken 8/24/2023 2101 by Cheryl Barrientos RN  Pain Management Interventions: (nitro given) see MAR  Taken 8/24/2023 2046 by Cheryl Barrientos RN  Pain Management Interventions: see MAR  Intervention: Optimize Psychosocial Wellbeing  Recent Flowsheet Documentation  Taken 8/24/2023 2000 by Cheryl Barrientos RN  Supportive Measures: active listening utilized  Diversional Activities:   smartphone   television     Problem: Nausea and Vomiting  Goal: Fluid and Electrolyte Balance  8/24/2023 2349 by Cheryl Barrientos RN  Outcome: Ongoing, Not Progressing  8/24/2023 2348 by Cheryl Barrientos RN  Outcome: Ongoing, Not Progressing  Intervention: Prevent and Manage Nausea and Vomiting  Recent Flowsheet Documentation  Taken 8/24/2023 2146 by Cheryl Barrientos RN  Nausea/Vomiting Interventions: see MAR  Taken 8/24/2023 2000 by Cheryl Barrientos RN  Environmental Support: calm environment promoted     Problem: Adult Inpatient Plan of Care  Goal: Plan of Care Review  Outcome: Ongoing, Not Progressing  Flowsheets (Taken 8/24/2023 2349)  Progress: no change  Plan of Care Reviewed With:  patient  Outcome Evaluation: pt NPO at midnight for stress test, VSS, SR on tele, PRN pain and PRN nausea meds, plan of care continued  Goal: Patient-Specific Goal (Individualized)  Outcome: Ongoing, Not Progressing  Goal: Absence of Hospital-Acquired Illness or Injury  Outcome: Ongoing, Not Progressing  Intervention: Identify and Manage Fall Risk  Recent Flowsheet Documentation  Taken 8/24/2023 2347 by Cheryl Barrientos RN  Safety Promotion/Fall Prevention: safety round/check completed  Taken 8/24/2023 2200 by Cheryl Barrientos RN  Safety Promotion/Fall Prevention: safety round/check completed  Taken 8/24/2023 2000 by Cheryl Barrientos RN  Safety Promotion/Fall Prevention:   safety round/check completed   assistive device/personal items within reach   clutter free environment maintained   elopement precautions   lighting adjusted   nonskid shoes/slippers when out of bed   room organization consistent  Intervention: Prevent Skin Injury  Recent Flowsheet Documentation  Taken 8/24/2023 2000 by Cheryl Barrientos RN  Body Position:   supine   sitting up in bed   position changed independently  Intervention: Prevent and Manage VTE (Venous Thromboembolism) Risk  Recent Flowsheet Documentation  Taken 8/24/2023 2000 by Cheryl Barrientos RN  Activity Management: up ad aline  Goal: Optimal Comfort and Wellbeing  Outcome: Ongoing, Not Progressing  Intervention: Monitor Pain and Promote Comfort  Recent Flowsheet Documentation  Taken 8/24/2023 2221 by Cheryl Barrientos RN  Pain Management Interventions: see MAR  Taken 8/24/2023 2101 by Cheryl Barrientos RN  Pain Management Interventions: (nitro given) see MAR  Taken 8/24/2023 2046 by Cheryl Barrientos RN  Pain Management Interventions: see MAR  Intervention: Provide Person-Centered Care  Recent Flowsheet Documentation  Taken 8/24/2023 2000 by Cheryl Barrientos RN  Trust Relationship/Rapport:   care explained   choices provided   emotional support provided   empathic listening provided    questions answered   questions encouraged   reassurance provided   thoughts/feelings acknowledged  Goal: Readiness for Transition of Care  Outcome: Ongoing, Not Progressing   Goal Outcome Evaluation:  Plan of Care Reviewed With: patient        Progress: no change  Outcome Evaluation: pt NPO at midnight for stress test, VSS, SR on tele, PRN pain and PRN nausea meds, plan of care continued

## 2023-08-25 NOTE — NURSING NOTE
This nurse spoke with Amanda NP for Dr. Figueroa she stated ok for patient to be D/C, they will do scopes outpatient. Pt needs PPI for home, if not already taking. This nurse informed Bety Best NP.

## 2023-08-25 NOTE — H&P
[facility] H&P    Patient Name: Madina Clarke  : 1975  MRN: [id number]  Primary Care Physician: Danny Wood MD  Date of admission: 2023     Patient Care Team:  Danny Wood MD as PCP - General (Family Medicine)  Marisa Rubin MD as Consulting Physician (Interventional Cardiology)          Subjective   History Present Illness     Chief Complaint:   Chief Complaint   Patient presents with    Chest Pain         Chest Pain     HPI    48-year-old female presents from GI clinic where she was about to get a scope for chest pain. Not currently having chest pain it was not this morning at clinic but endorsed it before they underwent the scope and so GI did not feel comfortable sedating her and sent her here. Prefer that she gets cardiac clearance so they can scope her tomorrow. States chest pain is sharp and crushing. It comes and goes. Nothing specific seems to bring it on.     Observation 23  Pt concurs with er hpi. Pt c/o intermittent chest pain. EKG and trop are unremarkable and without changes. Stress test ordered. Cardiology consulted.       ROS  Review of Systems   Cardiovascular:  Positive for chest pain.         Personal History     Past Medical History:   Past Medical History:   Diagnosis Date    Bipolar disorder     DDD (degenerative disc disease), lumbar     Diabetes mellitus     Fibromyalgia     Hypertension        Surgical History:      Past Surgical History:   Procedure Laterality Date    BREAST LUMPECTOMY Left     CARPAL TUNNEL RELEASE       SECTION       SECTION      SPINAL CORD STIMULATOR IMPLANT      SPINAL CORD STIMULATOR REMOVAL      TENNIS ELBOW RELEASE Bilateral            Family History: family history includes Breast cancer in her maternal aunt, maternal grandmother, mother, paternal aunt, and paternal grandmother; Heart disease in her maternal grandmother, mother, paternal grandmother, and sister; Hypertension in her maternal  grandmother, mother, paternal grandmother, and sister. Otherwise pertinent FHx was reviewed and unremarkable.     Social History:  reports that she has been smoking cigarettes. She has a 8.75 pack-year smoking history. She has never used smokeless tobacco. She reports current alcohol use. She reports current drug use. Drug: Marijuana.      Medications:  Prior to Admission medications    Medication Sig Start Date End Date Taking? Authorizing Provider   albuterol sulfate  (90 Base) MCG/ACT inhaler Inhale 1 puff Every 4 (Four) Hours As Needed.   Yes Marlene Guillaume MD   atomoxetine (STRATTERA) 40 MG capsule Take 1 capsule by mouth Daily.   Yes Marlene Guillaume MD   atorvastatin (LIPITOR) 20 MG tablet Take 1 tablet by mouth every night at bedtime. 2/6/23  Yes Marlene Guillaume MD   escitalopram (LEXAPRO) 10 MG tablet Take 1 tablet by mouth Daily.   Yes Marlene Guillaume MD   gabapentin (NEURONTIN) 800 MG tablet Take 1 tablet by mouth 2 (Two) Times a Day. 2/1/23  Yes Marlene Guillaume MD   lamoTRIgine (LaMICtal) 25 MG tablet Take 2 tablets by mouth Daily.   Yes Marlene Guillaume MD   lisinopril (PRINIVIL,ZESTRIL) 20 MG tablet Take 1 tablet by mouth Daily. 2/28/23  Yes Marisa Rubin MD   medroxyPROGESTERone (DEPO-PROVERA) 150 MG/ML injection Inject 1 mL into the appropriate muscle as directed by prescriber Every 3 (Three) Months.   Yes Marlene Guillaume MD   QUEtiapine (SEROquel) 50 MG tablet Take 2 tablets by mouth Every Night. 1/29/23  Yes Marlene Guillauem MD   QUEtiapine (SEROquel) 50 MG tablet Take 1 tablet by mouth Daily.   Yes Marlene Guillaume MD       Allergies:    Allergies   Allergen Reactions    Meperidine Hives    Penicillins Other (See Comments)     Unknown childhood     Iodinated Contrast Media Itching       Objective   Objective     Vital Signs  Temp:  [97.8 øF (36.6 øC)-98.5 øF (36.9 øC)] 98.5 øF (36.9 øC)  Heart Rate:  [] 92  Resp:  [13-19]  13  BP: (104-143)/() 124/86  SpO2:  [95 %-100 %] 98 %  on   ;   Device (Oxygen Therapy): room air  Body mass index is 35.33 kg/mý.    Physical Exam  HENT:      Mouth/Throat:      Mouth: Mucous membranes are moist.   Cardiovascular:      Rate and Rhythm: Normal rate and regular rhythm.      Pulses: Normal pulses.      Heart sounds: Normal heart sounds.   Pulmonary:      Effort: Pulmonary effort is normal.      Breath sounds: Normal breath sounds.   Abdominal:      Palpations: Abdomen is soft.   Neurological:      General: No focal deficit present.      Mental Status: She is alert and oriented to person, place, and time.   Psychiatric:         Mood and Affect: Mood normal.         Behavior: Behavior normal.         Results Review:  I have personally reviewed most recent cardiac tracings, lab results, and radiology images and interpretations and agree with findings, most notably: cbc, cmp, hgb A1c, lipid panel, EKG, UA and chest xray.    Results from last 7 days   Lab Units 08/25/23  0405   WBC 10*3/mm3 9.70   HEMOGLOBIN g/dL 15.0   HEMATOCRIT % 42.9   PLATELETS 10*3/mm3 276     Results from last 7 days   Lab Units 08/25/23  0405 08/24/23  1652 08/24/23  1314   SODIUM mmol/L 138  --  139   POTASSIUM mmol/L 4.0  --  3.9   CHLORIDE mmol/L 104  --  102   CO2 mmol/L 21.0*  --  21.0*   BUN mg/dL 11  --  9   CREATININE mg/dL 0.81  --  0.81   GLUCOSE mg/dL 194*  --  145*   CALCIUM mg/dL 9.1  --  10.3   ALT (SGPT) U/L  --   --  16   AST (SGOT) U/L  --   --  15   HSTROP T ng/L  --  <6 <6     Estimated Creatinine Clearance: 99.6 mL/min (by C-G formula based on SCr of 0.81 mg/dL).  Brief Urine Lab Results  (Last result in the past 365 days)        Color   Clarity   Blood   Leuk Est   Nitrite   Protein   CREAT   Urine HCG        08/24/23 1440 Yellow   Clear   Negative   Negative   Negative   Negative                   Microbiology Results (last 10 days)       ** No results found for the last 240 hours. **            ECG/EMG  Results (most recent)       Procedure Component Value Units Date/Time    SCANNED - TELEMETRY   [[id number]] Resulted: 08/24/23     Updated: 08/24/23 1624    SCANNED - TELEMETRY   [[id number]] Resulted: 08/24/23     Updated: 08/25/23 0558    SCANNED - TELEMETRY   [[id number]] Resulted: 08/24/23     Updated: 08/25/23 0723    ECG 12 Lead Chest Pain [[id number]] Collected: 08/24/23 1149     Updated: 08/25/23 0737     QT Interval 382 ms     Narrative:      HEART RATE= 83  bpm  RR Interval= 724  ms  DE Interval= 146  ms  P Horizontal Axis= 10  deg  P Front Axis= 28  deg  QRSD Interval= 81  ms  QT Interval= 382  ms  QRS Axis= -11  deg  T Wave Axis= 18  deg  - ABNORMAL ECG -  Sinus rhythm  Inferior infarct, old  No previous ECG available for comparison  Electronically Signed By: Bryson Reed (RYAN) 25-Aug-2023 07:37:40  Date and Time of Study: 2023-08-24 11:49:46    SCANNED - TELEMETRY   [[id number]] Resulted: 08/24/23     Updated: 08/25/23 0843    SCANNED - TELEMETRY   [[id number]] Resulted: 08/24/23     Updated: 08/25/23 0843                    XR Chest 1 View    Result Date: 8/24/2023  Impression: No acute cardiopulmonary finding. Electronically Signed: Gracie Contreras MD  8/24/2023 12:52 PM EDT  Workstation ID: [id number]       Estimated Creatinine Clearance: 99.6 mL/min (by C-G formula based on SCr of 0.81 mg/dL).    Assessment & Plan   Assessment/Plan       Active Hospital Problems    Diagnosis  POA    **Chest pain [R07.9]  Yes    DM (diabetes mellitus), type 2 [E11.9]  Yes    Tobacco abuse [Z72.0]  Yes    Essential hypertension [I10]  Yes    Gastroesophageal reflux disease [K21.9]  Yes    Hyperlipidemia [E78.5]  Yes      Resolved Hospital Problems   No resolved problems to display.     Chest pain  Lab Results   Component Value Date    TROPONINT <6 08/24/2023    TROPONINT <6 08/24/2023     -cbc,bmp are unremarkable  -Lipid panel , Trig 180, HDL 42   -Chest X-ray:reviewed and showing no cute cardiopulmonary  processes  -EKG: rate 83, sinus no st elevation  -In the ED pt given asa  -Stress Test performed and is low risk for ischemia  - cardiology consulted for clearance for GI procedure  -Telemetry  -NPO             VTE Prophylaxis -   Mechanical Order History:        Ordered        08/24/23 1541  Place Sequential Compression Device  Once            08/24/23 1541  Maintain Sequential Compression Device  Continuous            08/24/23 1541  Place Sequential Compression Device  Once            08/24/23 1541  Maintain Sequential Compression Device  Continuous                          Pharmalogical Order History:       None            CODE STATUS:    Code Status and Medical Interventions:   Ordered at: 08/24/23 1431     Code Status (Patient has no pulse and is not breathing):    CPR (Attempt to Resuscitate)     Medical Interventions (Patient has pulse or is breathing):    Full Support       This patient has been examined wearing personal protective equipment.     I discussed the patient's findings and my recommendations with patient and nursing staff.      Signature:Electronically signed by Bety Best PA-C, 08/25/23, 9:38 AM EDT.

## 2023-08-28 ENCOUNTER — TELEPHONE (OUTPATIENT)
Dept: DIABETES SERVICES | Facility: HOSPITAL | Age: 48
End: 2023-08-28
Payer: MEDICARE

## 2023-08-28 NOTE — PROGRESS NOTES
Encounter Date:08/29/2023        Patient ID: Madina Clarke is a 48 y.o. female.      Chief Complaint:      History of Present Illness  48 years old pleasant woman with hypertension, hyperlipidemia, diabetes, significant family history of premature coronary disease was evaluated in the hospital for chest pain.  She had negative stress test and was advised to undergo EGD/colonoscopy.  Noncardiac causes of chest pain were suggested.  Today she comes in with Ongoing complaints of chest pain or shortness of breath however is looking forward to undergoing EGD and colonoscopy.  She tells me that she has changing her diet and will start to exercise soon.    The following portions of the patient's history were reviewed and updated as appropriate: allergies, current medications, past family history, past medical history, past social history, past surgical history, and problem list.    Review of Systems   Constitutional: Positive for malaise/fatigue.   Cardiovascular:  Positive for chest pain and palpitations. Negative for dyspnea on exertion and leg swelling.   Respiratory:  Positive for shortness of breath. Negative for cough.    Gastrointestinal:  Positive for nausea. Negative for abdominal pain and vomiting.   Neurological:  Positive for dizziness, light-headedness and numbness. Negative for focal weakness and headaches.   All other systems reviewed and are negative.      Current Outpatient Medications:     Accu-Chek Softclix Lancets lancets, 1 each by Other route 3 (Three) Times a Day Before Meals. Use as instructed Dx code: E11.65, Disp: 100 each, Rfl: 2    albuterol sulfate  (90 Base) MCG/ACT inhaler, Inhale 1 puff Every 4 (Four) Hours As Needed., Disp: , Rfl:     atomoxetine (STRATTERA) 40 MG capsule, Take 1 capsule by mouth Daily., Disp: , Rfl:     atorvastatin (LIPITOR) 80 MG tablet, Take 1 tablet by mouth every night at bedtime., Disp: , Rfl:     atorvastatin (LIPITOR) 80 MG tablet, Take 1 tablet by mouth  Every Night., Disp: 90 tablet, Rfl: 0    Blood Glucose Monitoring Suppl (Accu-Chek Guide Me) w/Device kit, 1 kit 3 (Three) Times a Day Before Meals. Dx code: E11.65  NDC [address]-45  Bin#: [id number] Group #: [id number]  ID #: [id number]  Issuer #: [id number]), Disp: 1 kit, Rfl: 0    escitalopram (LEXAPRO) 10 MG tablet, Take 1 tablet by mouth Daily., Disp: , Rfl:     gabapentin (NEURONTIN) 800 MG tablet, Take 1 tablet by mouth 2 (Two) Times a Day., Disp: , Rfl:     glucose blood (Accu-Chek Guide) test strip, 1 each by Other route 3 (Three) Times a Day Before Meals. Use as instructed Dx code: E11.65, Disp: 100 each, Rfl: 2    lamoTRIgine (LaMICtal) 25 MG tablet, Take 2 tablets by mouth Daily., Disp: , Rfl:     lisinopril (PRINIVIL,ZESTRIL) 20 MG tablet, Take 1 tablet by mouth Daily., Disp: 90 tablet, Rfl: 3    medroxyPROGESTERone (DEPO-PROVERA) 150 MG/ML injection, Inject 1 mL into the appropriate muscle as directed by prescriber Every 3 (Three) Months., Disp: , Rfl:     QUEtiapine (SEROquel) 50 MG tablet, Take 2 tablets by mouth Every Night., Disp: , Rfl:     QUEtiapine (SEROquel) 50 MG tablet, Take 1 tablet by mouth Daily., Disp: , Rfl:     carvedilol (Coreg) 6.25 MG tablet, Take 1 tablet by mouth 2 (Two) Times a Day With Meals. (Patient not taking: Reported on 8/29/2023), Disp: 60 tablet, Rfl: 0    pantoprazole (PROTONIX) 40 MG EC tablet, Take 1 tablet by mouth Daily. (Patient not taking: Reported on 8/29/2023), Disp: 30 tablet, Rfl: 0    Current outpatient and discharge medications have been reconciled for the patient.  Reviewed by: Malik Nava MD       Allergies   Allergen Reactions    Meperidine Hives    Penicillins Other (See Comments)     Unknown childhood     Iodinated Contrast Media Itching       Family History   Problem Relation Age of Onset    Breast cancer Mother     Hypertension Mother     Heart disease Mother     Hypertension Sister     Heart disease Sister     Breast cancer Maternal Grandmother      "Hypertension Maternal Grandmother     Heart disease Maternal Grandmother     Breast cancer Paternal Grandmother     Hypertension Paternal Grandmother     Heart disease Paternal Grandmother     Breast cancer Maternal Aunt     Breast cancer Paternal Aunt        Past Surgical History:   Procedure Laterality Date    BREAST LUMPECTOMY Left     CARPAL TUNNEL RELEASE       SECTION       SECTION      SPINAL CORD STIMULATOR IMPLANT      SPINAL CORD STIMULATOR REMOVAL      TENNIS ELBOW RELEASE Bilateral        Past Medical History:   Diagnosis Date    Bipolar disorder     DDD (degenerative disc disease), lumbar     Diabetes mellitus     Fibromyalgia     Hypertension        Family History   Problem Relation Age of Onset    Breast cancer Mother     Hypertension Mother     Heart disease Mother     Hypertension Sister     Heart disease Sister     Breast cancer Maternal Grandmother     Hypertension Maternal Grandmother     Heart disease Maternal Grandmother     Breast cancer Paternal Grandmother     Hypertension Paternal Grandmother     Heart disease Paternal Grandmother     Breast cancer Maternal Aunt     Breast cancer Paternal Aunt        Social History     Socioeconomic History    Marital status: Significant Other   Tobacco Use    Smoking status: Every Day     Packs/day: 0.25     Years: 35.00     Pack years: 8.75     Types: Cigarettes    Smokeless tobacco: Never   Vaping Use    Vaping Use: Former    Substances: CBD    Devices: Disposable   Substance and Sexual Activity    Alcohol use: Yes     Comment: 1 time per year    Drug use: Yes     Types: Marijuana    Sexual activity: Defer               Objective:       Physical Exam    /80 (BP Location: Left arm, Patient Position: Sitting, Cuff Size: Large Adult)   Pulse 98   Ht 165.1 cm (65\")   Wt 96.2 kg (212 lb)   LMP 2023 (Approximate)   SpO2 98%   BMI 35.28 kg/mý   The patient is alert, oriented and in no distress.    Vital signs as noted " above.    Head and neck revealed no carotid bruits or jugular venous distension.  No thyromegaly or lymphadenopathy is present.    Lungs clear.  No wheezing.  Breath sounds are normal bilaterally.    Heart normal first and second heart sounds.  No murmur..  No pericardial rub is present.  No gallop is present.    Abdomen soft and nontender.  No organomegaly is present.    Extremities revealed good peripheral pulses without any pedal edema.    Skin warm and dry.    Musculoskeletal system is grossly normal.    CNS grossly normal.           Diagnosis Plan   1. Essential hypertension        2. Mixed hyperlipidemia        3. Precordial pain        4. Dyspnea on exertion        5. Type 2 diabetes mellitus without complication, with long-term current use of insulin        6. Class 2 obesity due to excess calories without serious comorbidity with body mass index (BMI) of 35.0 to 35.9 in adult        7. Anxiety and depression        LAB RESULTS (LAST 7 DAYS)    CBC  Results from last 7 days   Lab Units 08/25/23  0405 08/24/23  1314   WBC 10*3/mm3 9.70 11.50*   RBC 10*6/mm3 4.85 5.61*   HEMOGLOBIN g/dL 15.0 16.6*   HEMATOCRIT % 42.9 48.8*   MCV fL 88.4 87.0   PLATELETS 10*3/mm3 276 301       BMP  Results from last 7 days   Lab Units 08/25/23  0405 08/24/23  1314   SODIUM mmol/L 138 139   POTASSIUM mmol/L 4.0 3.9   CHLORIDE mmol/L 104 102   CO2 mmol/L 21.0* 21.0*   BUN mg/dL 11 9   CREATININE mg/dL 0.81 0.81   GLUCOSE mg/dL 194* 145*       CMP   Results from last 7 days   Lab Units 08/25/23  0405 08/24/23  1314   SODIUM mmol/L 138 139   POTASSIUM mmol/L 4.0 3.9   CHLORIDE mmol/L 104 102   CO2 mmol/L 21.0* 21.0*   BUN mg/dL 11 9   CREATININE mg/dL 0.81 0.81   GLUCOSE mg/dL 194* 145*   ALBUMIN g/dL  --  5.2   BILIRUBIN mg/dL  --  0.7   ALK PHOS U/L  --  123*   AST (SGOT) U/L  --  15   ALT (SGPT) U/L  --  16         BNP        TROPONIN  Results from last 7 days   Lab Units 08/24/23  1652   HSTROP T ng/L <6       CoAg         Creatinine Clearance  Estimated Creatinine Clearance: 99.6 mL/min (by C-G formula based on SCr of 0.81 mg/dL).    ABG        Radiology  No radiology results for the last day    EKG  Procedures    Stress test  Results for orders placed during the hospital encounter of 08/24/23    Stress Test With Myocardial Perfusion One Day    Interpretation Summary    Findings consistent with a normal ECG stress test.    Left ventricular ejection fraction is hyperdynamic (Calculated EF > 70%).    Myocardial perfusion imaging indicates a normal myocardial perfusion study with no evidence of ischemia.    Impressions are consistent with a low risk study.      Echocardiogram      Cardiac catheterization  No results found for this or any previous visit.        Assessment and Plan       Diagnoses and all orders for this visit:    1. Essential hypertension (Primary)    2. Mixed hyperlipidemia    3. Precordial pain    4. Dyspnea on exertion    5. Type 2 diabetes mellitus without complication, with long-term current use of insulin    6. Class 2 obesity due to excess calories without serious comorbidity with body mass index (BMI) of 35.0 to 35.9 in adult    7. Anxiety and depression       Chest pain  Chest discomfort appears atypical .  Troponin is negative X 2 and ECG does not show any active ischemia.  She does have significant family history of premature coronary disease along with multiple cardiovascular risk factors.  Stress test os negative for ischemia. Low risk  Restart beta blocker  Eval for non cardiac reasons for chest pain.  Proceed with EGD/Colonoscopy with no further  cardiac work-up.     Hypertension  Blood pressure is well controlled on lisinopril and Coreg.     Hyperlipidemia  Currently on Lipitor 20 mg daily.  , HDL 42, triglyceride 180 and total cholesterol 196.  Goal LDL is less than 70.  Increase atorvastatin to 80 mg p.o. daily     Diabetes  A1c is 8  Needs better diabetes control  She reports to be taking  insulin at home.     Obesity  BMI is ~35  Diet, exercise, weight loss and lifestyle modification discussed with the patient.  We also discussed screening and treatment of sleep apnea.     Anxiety/depression/fibromyalgia  On escitalopram and quetiapine  She is also on lamotrigine and atomoxetine.

## 2023-08-29 ENCOUNTER — OUTSIDE FACILITY SERVICE (OUTPATIENT)
Dept: CARDIOLOGY | Facility: CLINIC | Age: 48
End: 2023-08-29
Payer: MEDICARE

## 2023-08-29 ENCOUNTER — OFFICE VISIT (OUTPATIENT)
Dept: CARDIOLOGY | Facility: CLINIC | Age: 48
End: 2023-08-29
Payer: MEDICARE

## 2023-08-29 ENCOUNTER — TELEPHONE (OUTPATIENT)
Dept: DIABETES SERVICES | Facility: HOSPITAL | Age: 48
End: 2023-08-29
Payer: MEDICARE

## 2023-08-29 VITALS
HEIGHT: 65 IN | SYSTOLIC BLOOD PRESSURE: 118 MMHG | BODY MASS INDEX: 35.32 KG/M2 | WEIGHT: 212 LBS | DIASTOLIC BLOOD PRESSURE: 80 MMHG | OXYGEN SATURATION: 98 % | HEART RATE: 98 BPM

## 2023-08-29 DIAGNOSIS — I10 ESSENTIAL HYPERTENSION: Primary | ICD-10-CM

## 2023-08-29 DIAGNOSIS — Z79.4 TYPE 2 DIABETES MELLITUS WITHOUT COMPLICATION, WITH LONG-TERM CURRENT USE OF INSULIN: Chronic | ICD-10-CM

## 2023-08-29 DIAGNOSIS — R07.2 PRECORDIAL PAIN: ICD-10-CM

## 2023-08-29 DIAGNOSIS — E78.2 MIXED HYPERLIPIDEMIA: ICD-10-CM

## 2023-08-29 DIAGNOSIS — E66.09 CLASS 2 OBESITY DUE TO EXCESS CALORIES WITHOUT SERIOUS COMORBIDITY WITH BODY MASS INDEX (BMI) OF 35.0 TO 35.9 IN ADULT: ICD-10-CM

## 2023-08-29 DIAGNOSIS — F32.A ANXIETY AND DEPRESSION: ICD-10-CM

## 2023-08-29 DIAGNOSIS — F41.9 ANXIETY AND DEPRESSION: ICD-10-CM

## 2023-08-29 DIAGNOSIS — R06.09 DYSPNEA ON EXERTION: ICD-10-CM

## 2023-08-29 DIAGNOSIS — E11.9 TYPE 2 DIABETES MELLITUS WITHOUT COMPLICATION, WITH LONG-TERM CURRENT USE OF INSULIN: Chronic | ICD-10-CM

## 2023-08-29 PROCEDURE — 93306 TTE W/DOPPLER COMPLETE: CPT | Performed by: INTERNAL MEDICINE

## 2023-08-29 PROCEDURE — 93356 MYOCRD STRAIN IMG SPCKL TRCK: CPT | Performed by: INTERNAL MEDICINE

## 2023-12-08 ENCOUNTER — OFFICE (OUTPATIENT)
Dept: URBAN - METROPOLITAN AREA PATHOLOGY 4 | Facility: PATHOLOGY | Age: 48
End: 2023-12-08

## 2023-12-08 ENCOUNTER — ON CAMPUS - OUTPATIENT (OUTPATIENT)
Dept: URBAN - METROPOLITAN AREA HOSPITAL 2 | Facility: HOSPITAL | Age: 48
End: 2023-12-08

## 2023-12-08 VITALS
HEART RATE: 92 BPM | OXYGEN SATURATION: 98 % | DIASTOLIC BLOOD PRESSURE: 85 MMHG | HEART RATE: 94 BPM | SYSTOLIC BLOOD PRESSURE: 157 MMHG | SYSTOLIC BLOOD PRESSURE: 145 MMHG | RESPIRATION RATE: 18 BRPM | HEART RATE: 90 BPM | HEART RATE: 88 BPM | RESPIRATION RATE: 20 BRPM | SYSTOLIC BLOOD PRESSURE: 156 MMHG | DIASTOLIC BLOOD PRESSURE: 98 MMHG | DIASTOLIC BLOOD PRESSURE: 107 MMHG | DIASTOLIC BLOOD PRESSURE: 108 MMHG | SYSTOLIC BLOOD PRESSURE: 148 MMHG | RESPIRATION RATE: 22 BRPM | SYSTOLIC BLOOD PRESSURE: 114 MMHG | DIASTOLIC BLOOD PRESSURE: 88 MMHG | DIASTOLIC BLOOD PRESSURE: 94 MMHG | DIASTOLIC BLOOD PRESSURE: 92 MMHG | WEIGHT: 197 LBS | SYSTOLIC BLOOD PRESSURE: 120 MMHG | OXYGEN SATURATION: 99 % | HEART RATE: 98 BPM | HEART RATE: 86 BPM | TEMPERATURE: 96.8 F | SYSTOLIC BLOOD PRESSURE: 130 MMHG | HEIGHT: 66 IN | SYSTOLIC BLOOD PRESSURE: 122 MMHG | DIASTOLIC BLOOD PRESSURE: 115 MMHG | OXYGEN SATURATION: 100 % | DIASTOLIC BLOOD PRESSURE: 89 MMHG | RESPIRATION RATE: 16 BRPM | HEART RATE: 100 BPM

## 2023-12-08 DIAGNOSIS — R13.10 DYSPHAGIA, UNSPECIFIED: ICD-10-CM

## 2023-12-08 DIAGNOSIS — R19.7 DIARRHEA, UNSPECIFIED: ICD-10-CM

## 2023-12-08 DIAGNOSIS — K59.1 FUNCTIONAL DIARRHEA: ICD-10-CM

## 2023-12-08 DIAGNOSIS — R11.2 NAUSEA WITH VOMITING, UNSPECIFIED: ICD-10-CM

## 2023-12-08 DIAGNOSIS — D12.2 BENIGN NEOPLASM OF ASCENDING COLON: ICD-10-CM

## 2023-12-08 PROBLEM — K63.5 POLYP OF COLON: Status: ACTIVE | Noted: 2023-12-08

## 2023-12-08 LAB
GI HISTOLOGY: A. UNSPECIFIED: (no result)
GI HISTOLOGY: B. UNSPECIFIED: (no result)
GI HISTOLOGY: C. UNSPECIFIED: (no result)
GI HISTOLOGY: PDF REPORT: (no result)

## 2023-12-08 PROCEDURE — 45380 COLONOSCOPY AND BIOPSY: CPT | Mod: 59 | Performed by: INTERNAL MEDICINE

## 2023-12-08 PROCEDURE — 43450 DILATE ESOPHAGUS 1/MULT PASS: CPT | Performed by: INTERNAL MEDICINE

## 2023-12-08 PROCEDURE — 45385 COLONOSCOPY W/LESION REMOVAL: CPT | Performed by: INTERNAL MEDICINE

## 2023-12-08 PROCEDURE — 43239 EGD BIOPSY SINGLE/MULTIPLE: CPT | Performed by: INTERNAL MEDICINE

## 2023-12-08 PROCEDURE — 88305 TISSUE EXAM BY PATHOLOGIST: CPT | Mod: 26 | Performed by: INTERNAL MEDICINE

## 2023-12-08 RX ORDER — DICYCLOMINE HYDROCHLORIDE 20 MG/1
60 TABLET ORAL
Qty: 90 | Refills: 11 | Status: ACTIVE
Start: 2023-12-08

## 2023-12-08 RX ADMIN — ONDANSETRON HYDROCHLORIDE 4 MG: 4 SOLUTION ORAL at 08:43

## (undated) DEVICE — GAUZE,SPONGE,FLUFF,6"X6.75",STRL,5/TRAY: Brand: MEDLINE

## (undated) DEVICE — GLOVE SURG SZ 65 THK91MIL LTX FREE SYN POLYISOPRENE

## (undated) DEVICE — TOWEL,OR,DSP,ST,NATURAL,DLX,4/PK,20PK/CS: Brand: MEDLINE

## (undated) DEVICE — SYRINGE IRRIG 60ML SFT PLIABLE BLB EZ TO GRP 1 HND USE W/

## (undated) DEVICE — GARMENT COMPR STD FOR 17IN CALF UNIF THER FLOTRN

## (undated) DEVICE — GLOVE ORANGE PI 7 1/2   MSG9075

## (undated) DEVICE — CHLORAPREP 26ML ORANGE

## (undated) DEVICE — GAUZE,SPONGE,4"X4",16PLY,XRAY,STRL,LF: Brand: MEDLINE

## (undated) DEVICE — ELECTRODE PT RET AD L9FT HI MOIST COND ADH HYDRGEL CORDED

## (undated) DEVICE — TUBING, SUCTION, 9/32" X 20', STRAIGHT: Brand: MEDLINE INDUSTRIES, INC.

## (undated) DEVICE — BLADE ES ELASTOMERIC COAT INSUL DURABLE BEND UPTO 90DEG

## (undated) DEVICE — YANKAUER,FLEXIBLE HANDLE,REGLR CAPACITY: Brand: MEDLINE INDUSTRIES, INC.

## (undated) DEVICE — ADHESIVE SKIN CLSR 0.7ML TOP DERMBND ADV

## (undated) DEVICE — SVMMC PEDS/UROLOGY MINOR PACK: Brand: MEDLINE INDUSTRIES, INC.

## (undated) DEVICE — COVER,LIGHT HANDLE,FLX,2/PK: Brand: MEDLINE INDUSTRIES, INC.

## (undated) DEVICE — DRAPE,REIN 53X77,STERILE: Brand: MEDLINE

## (undated) DEVICE — GOWN,AURORA,NONRNF,XL,30/CS: Brand: MEDLINE